# Patient Record
Sex: MALE | Race: WHITE | URBAN - METROPOLITAN AREA
[De-identification: names, ages, dates, MRNs, and addresses within clinical notes are randomized per-mention and may not be internally consistent; named-entity substitution may affect disease eponyms.]

---

## 2022-01-19 LAB
ANION GAP SERPL CALCULATED.3IONS-SCNC: 13 MMOL/L (ref 10–20)
BICARBONATE: 23 MMOL/L (ref 21–32)
CALCIUM SERPL-MCNC: 8.7 MG/DL (ref 8.6–10.3)
CHLORIDE BLD-SCNC: 103 MMOL/L (ref 98–107)
CREAT SERPL-MCNC: 1.23 MG/DL (ref 0.5–1.3)
EGFR MALE: 70 ML/MIN/1.73M2
ERYTHROCYTE [DISTWIDTH] IN BLOOD BY AUTOMATED COUNT: 12.3 % (ref 11.5–14)
GLUCOSE: 301 MG/DL (ref 74–99)
HCT VFR BLD CALC: 31.9 % (ref 41–52)
HEMOGLOBIN: 10.3 G/DL (ref 13.5–17)
MCHC RBC AUTO-ENTMCNC: 32.3 G/DL (ref 32–36)
MCV RBC AUTO: 91 FL (ref 80–100)
PLATELET # BLD: 288 X10E9/L (ref 150–450)
POTASSIUM SERPL-SCNC: 4.4 MMOL/L (ref 3.5–5.3)
RBC # BLD: 3.49 X10E12/L (ref 4.5–5.9)
SODIUM BLD-SCNC: 135 MMOL/L (ref 136–145)
UREA NITROGEN: 20 MG/DL (ref 6–23)
WBC: 5.5 X10E9/L (ref 4.4–11.3)

## 2022-01-27 LAB
ANION GAP SERPL CALCULATED.3IONS-SCNC: 11 MMOL/L (ref 10–20)
BICARBONATE: 23 MMOL/L (ref 21–32)
CALCIUM SERPL-MCNC: 8.4 MG/DL (ref 8.6–10.3)
CHLORIDE BLD-SCNC: 102 MMOL/L (ref 98–107)
CREAT SERPL-MCNC: 1.26 MG/DL (ref 0.5–1.3)
EGFR MALE: 68 ML/MIN/1.73M2
ERYTHROCYTE [DISTWIDTH] IN BLOOD BY AUTOMATED COUNT: 12.4 % (ref 11.5–14)
GLUCOSE: 383 MG/DL (ref 74–99)
HCT VFR BLD CALC: 30.5 % (ref 41–52)
HEMOGLOBIN: 9.9 G/DL (ref 13.5–17)
MCHC RBC AUTO-ENTMCNC: 32.5 G/DL (ref 32–36)
MCV RBC AUTO: 90 FL (ref 80–100)
PLATELET # BLD: 200 X10E9/L (ref 150–450)
POTASSIUM SERPL-SCNC: 4.7 MMOL/L (ref 3.5–5.3)
RBC # BLD: 3.38 X10E12/L (ref 4.5–5.9)
SODIUM BLD-SCNC: 131 MMOL/L (ref 136–145)
UREA NITROGEN: 31 MG/DL (ref 6–23)
WBC: 6.5 X10E9/L (ref 4.4–11.3)

## 2022-02-01 LAB
ANION GAP SERPL CALCULATED.3IONS-SCNC: 12 MMOL/L (ref 10–20)
BICARBONATE: 24 MMOL/L (ref 21–32)
CALCIUM SERPL-MCNC: 8.6 MG/DL (ref 8.6–10.3)
CHLORIDE BLD-SCNC: 104 MMOL/L (ref 98–107)
CREAT SERPL-MCNC: 1.47 MG/DL (ref 0.5–1.3)
EGFR MALE: 57 ML/MIN/1.73M2
ERYTHROCYTE [DISTWIDTH] IN BLOOD BY AUTOMATED COUNT: 12.9 % (ref 11.5–14)
GLUCOSE: 272 MG/DL (ref 74–99)
HCT VFR BLD CALC: 31.2 % (ref 41–52)
HEMOGLOBIN: 10.1 G/DL (ref 13.5–17)
MCHC RBC AUTO-ENTMCNC: 32.4 G/DL (ref 32–36)
MCV RBC AUTO: 91 FL (ref 80–100)
PLATELET # BLD: 212 X10E9/L (ref 150–450)
POTASSIUM SERPL-SCNC: 4.6 MMOL/L (ref 3.5–5.3)
RBC # BLD: 3.44 X10E12/L (ref 4.5–5.9)
SODIUM BLD-SCNC: 135 MMOL/L (ref 136–145)
UREA NITROGEN: 37 MG/DL (ref 6–23)
WBC: 6.2 X10E9/L (ref 4.4–11.3)

## 2022-02-08 LAB
ANION GAP SERPL CALCULATED.3IONS-SCNC: 11 MMOL/L (ref 10–20)
BICARBONATE: 26 MMOL/L (ref 21–32)
CALCIUM SERPL-MCNC: 9 MG/DL (ref 8.6–10.3)
CHLORIDE BLD-SCNC: 102 MMOL/L (ref 98–107)
CREAT SERPL-MCNC: 1.1 MG/DL (ref 0.5–1.3)
EGFR MALE: 80 ML/MIN/1.73M2
ERYTHROCYTE [DISTWIDTH] IN BLOOD BY AUTOMATED COUNT: 13 % (ref 11.5–14)
ESTIMATED AVERAGE GLUCOSE: 283 MG/DL
GLUCOSE: 380 MG/DL (ref 74–99)
HBA1C MFR BLD: 11.5 %
HCT VFR BLD CALC: 31.9 % (ref 41–52)
HEMOGLOBIN: 10.3 G/DL (ref 13.5–17)
MCHC RBC AUTO-ENTMCNC: 32.3 G/DL (ref 32–36)
MCV RBC AUTO: 91 FL (ref 80–100)
PLATELET # BLD: 219 X10E9/L (ref 150–450)
POTASSIUM SERPL-SCNC: 4.8 MMOL/L (ref 3.5–5.3)
RBC # BLD: 3.52 X10E12/L (ref 4.5–5.9)
SODIUM BLD-SCNC: 134 MMOL/L (ref 136–145)
UREA NITROGEN: 27 MG/DL (ref 6–23)
WBC: 7.2 X10E9/L (ref 4.4–11.3)

## 2022-02-10 ENCOUNTER — TELEPHONE (OUTPATIENT)
Dept: INFECTIOUS DISEASES | Age: 52
End: 2022-02-10

## 2022-02-10 NOTE — TELEPHONE ENCOUNTER
[address] has called several times is regards to the End of Tx with IV Abx. [address] records obtained. Podiatry Notes obtained. Per review with Dr Roberth Herrera, patient is to be seen prior to ending the IV Abx. Will update Pharmacy and Patient. Patient has a F/u with ID on 3/3/2022.

## 2022-02-15 ENCOUNTER — TELEPHONE (OUTPATIENT)
Dept: INFECTIOUS DISEASES | Age: 52
End: 2022-02-15

## 2022-02-15 LAB
ANION GAP SERPL CALCULATED.3IONS-SCNC: 13 MMOL/L (ref 10–20)
BICARBONATE: 25 MMOL/L (ref 21–32)
CALCIUM SERPL-MCNC: 8.8 MG/DL (ref 8.6–10.3)
CHLORIDE BLD-SCNC: 100 MMOL/L (ref 98–107)
CREAT SERPL-MCNC: 1.23 MG/DL (ref 0.5–1.3)
EGFR MALE: 70 ML/MIN/1.73M2
ERYTHROCYTE [DISTWIDTH] IN BLOOD BY AUTOMATED COUNT: 12.9 % (ref 11.5–14)
GLUCOSE: 475 MG/DL (ref 74–99)
HCT VFR BLD CALC: 32.1 % (ref 41–52)
HEMOGLOBIN: 10.3 G/DL (ref 13.5–17)
MCHC RBC AUTO-ENTMCNC: 32.1 G/DL (ref 32–36)
MCV RBC AUTO: 92 FL (ref 80–100)
PLATELET # BLD: 203 X10E9/L (ref 150–450)
POTASSIUM SERPL-SCNC: 5.3 MMOL/L (ref 3.5–5.3)
RBC # BLD: 3.49 X10E12/L (ref 4.5–5.9)
SODIUM BLD-SCNC: 133 MMOL/L (ref 136–145)
UREA NITROGEN: 25 MG/DL (ref 6–23)
WBC: 4.5 X10E9/L (ref 4.4–11.3)

## 2022-02-15 NOTE — TELEPHONE ENCOUNTER
@ 2:45pm,  reports a High Glucose result from Labs drawn today, Will update ID Staff. Results expected to be faxed.  The PCP will be updated

## 2022-02-22 LAB
ANION GAP SERPL CALCULATED.3IONS-SCNC: 13 MMOL/L (ref 10–20)
BICARBONATE: 25 MMOL/L (ref 21–32)
CALCIUM SERPL-MCNC: 9.2 MG/DL (ref 8.6–10.3)
CHLORIDE BLD-SCNC: 100 MMOL/L (ref 98–107)
CREAT SERPL-MCNC: 1.24 MG/DL (ref 0.5–1.3)
EGFR MALE: 70 ML/MIN/1.73M2
ERYTHROCYTE [DISTWIDTH] IN BLOOD BY AUTOMATED COUNT: 13.5 % (ref 11.5–14)
GLUCOSE: 402 MG/DL (ref 74–99)
HCT VFR BLD CALC: 35.8 % (ref 41–52)
HEMOGLOBIN: 11.4 G/DL (ref 13.5–17)
MCHC RBC AUTO-ENTMCNC: 31.8 G/DL (ref 32–36)
MCV RBC AUTO: 94 FL (ref 80–100)
PLATELET # BLD: 226 X10E9/L (ref 150–450)
POTASSIUM SERPL-SCNC: 4.1 MMOL/L (ref 3.5–5.3)
RBC # BLD: 3.79 X10E12/L (ref 4.5–5.9)
SODIUM BLD-SCNC: 134 MMOL/L (ref 136–145)
UREA NITROGEN: 27 MG/DL (ref 6–23)
WBC: 4.3 X10E9/L (ref 4.4–11.3)

## 2022-03-01 LAB
ANION GAP SERPL CALCULATED.3IONS-SCNC: 16 MMOL/L (ref 10–20)
BICARBONATE: 20 MMOL/L (ref 21–32)
CALCIUM SERPL-MCNC: 8.9 MG/DL (ref 8.6–10.3)
CHLORIDE BLD-SCNC: 106 MMOL/L (ref 98–107)
CREAT SERPL-MCNC: 1.14 MG/DL (ref 0.5–1.3)
EGFR MALE: 77 ML/MIN/1.73M2
ERYTHROCYTE [DISTWIDTH] IN BLOOD BY AUTOMATED COUNT: 12.9 % (ref 11.5–14)
GLUCOSE: 225 MG/DL (ref 74–99)
HCT VFR BLD CALC: 32.9 % (ref 41–52)
HEMOGLOBIN: 10.9 G/DL (ref 13.5–17)
MCHC RBC AUTO-ENTMCNC: 33.1 G/DL (ref 32–36)
MCV RBC AUTO: 91 FL (ref 80–100)
PLATELET # BLD: 226 X10E9/L (ref 150–450)
POTASSIUM SERPL-SCNC: 4.5 MMOL/L (ref 3.5–5.3)
RBC # BLD: 3.63 X10E12/L (ref 4.5–5.9)
SODIUM BLD-SCNC: 137 MMOL/L (ref 136–145)
UREA NITROGEN: 25 MG/DL (ref 6–23)
WBC: 4.9 X10E9/L (ref 4.4–11.3)

## 2022-03-03 ENCOUNTER — OFFICE VISIT (OUTPATIENT)
Dept: INFECTIOUS DISEASES | Age: 52
End: 2022-03-03
Payer: COMMERCIAL

## 2022-03-03 VITALS
SYSTOLIC BLOOD PRESSURE: 104 MMHG | HEIGHT: 67 IN | RESPIRATION RATE: 18 BRPM | TEMPERATURE: 97.7 F | HEART RATE: 82 BPM | BODY MASS INDEX: 23.86 KG/M2 | DIASTOLIC BLOOD PRESSURE: 60 MMHG | WEIGHT: 152 LBS

## 2022-03-03 DIAGNOSIS — M86.9 OSTEOMYELITIS OF GREAT TOE OF LEFT FOOT (HCC): Primary | ICD-10-CM

## 2022-03-03 PROCEDURE — 99213 OFFICE O/P EST LOW 20 MIN: CPT | Performed by: INTERNAL MEDICINE

## 2022-03-03 RX ORDER — ZIPRASIDONE HYDROCHLORIDE 80 MG/1
CAPSULE ORAL
COMMUNITY
Start: 2022-01-04 | End: 2022-03-03

## 2022-03-03 RX ORDER — PIPERACILLIN SODIUM, TAZOBACTAM SODIUM 3; .375 G/15ML; G/15ML
INJECTION, POWDER, LYOPHILIZED, FOR SOLUTION INTRAVENOUS
COMMUNITY
Start: 2022-02-23

## 2022-03-03 RX ORDER — CEPHALEXIN 500 MG/1
CAPSULE ORAL
COMMUNITY
Start: 2021-12-21

## 2022-03-03 RX ORDER — BUDESONIDE AND FORMOTEROL FUMARATE DIHYDRATE 80; 4.5 UG/1; UG/1
AEROSOL RESPIRATORY (INHALATION)
COMMUNITY
Start: 2022-01-05

## 2022-03-03 RX ORDER — SODIUM CHLORIDE 9 MG/ML
INJECTION, SOLUTION INTRAVENOUS
COMMUNITY
Start: 2022-02-23

## 2022-03-03 RX ORDER — LISINOPRIL 2.5 MG/1
2.5 TABLET ORAL DAILY
COMMUNITY
Start: 2021-08-03

## 2022-03-03 RX ORDER — BENZTROPINE MESYLATE 0.5 MG/1
TABLET ORAL
COMMUNITY
Start: 2022-01-06

## 2022-03-03 RX ORDER — NAPROXEN SODIUM 550 MG/1
TABLET ORAL
COMMUNITY
Start: 2021-12-21

## 2022-03-03 RX ORDER — DOXYCYCLINE HYCLATE 100 MG/1
CAPSULE ORAL
COMMUNITY
Start: 2021-12-21

## 2022-03-03 RX ORDER — MICAFUNGIN SODIUM 100 MG/5ML
INJECTION, POWDER, LYOPHILIZED, FOR SOLUTION INTRAVENOUS
COMMUNITY
Start: 2022-02-23

## 2022-03-03 RX ORDER — FLUOXETINE HYDROCHLORIDE 20 MG/1
CAPSULE ORAL
COMMUNITY

## 2022-03-03 RX ORDER — ATORVASTATIN CALCIUM 10 MG/1
10 TABLET, FILM COATED ORAL DAILY
COMMUNITY
Start: 2022-02-11

## 2022-03-03 ASSESSMENT — ENCOUNTER SYMPTOMS
GASTROINTESTINAL NEGATIVE: 1
RESPIRATORY NEGATIVE: 1

## 2022-03-03 NOTE — PROGRESS NOTES
Infectious Disease     Patient Name: Ho Chairez  Date: 3/3/2022  YOB: 1970  Medical Record Number: [id number]      Patient hospitalized at [facility] with osteomyelitis left foot at bedside debridement at [facility]    He was discharged on Zosyn and micafungin completing more than 6 weeks of therapy    Wound culture from 1/6/2022 grew Enterococcus faecalis pansensitive and Candida         Review of Systems   Respiratory: Negative. Cardiovascular: Negative. Gastrointestinal: Negative. Skin: Positive for wound. Review of Systems: All 14 review of systems negative other than as stated above    Social History     Tobacco Use    Smoking status: Never Smoker    Smokeless tobacco: Never Used   Substance Use Topics    Alcohol use: Not on file    Drug use: Not on file         Past medical history hypertension chronic renal sufficiency diabetes on insulin hyperlipidemia      No past surgical history on file.       Current Outpatient Medications on File Prior to Visit   Medication Sig Dispense Refill    lisinopril (PRINIVIL;ZESTRIL) 2.5 MG tablet Take 2.5 mg by mouth daily      atorvastatin (LIPITOR) 10 MG tablet Take 10 mg by mouth daily      cephALEXin (KEFLEX) 500 MG capsule take 1 capsule by mouth four times a day for 10 days      doxycycline hyclate (VIBRAMYCIN) 100 MG capsule take 1 capsule by mouth twice a day for 10 days      piperacillin-tazobactam (ZOSYN) 3.375 (3-0.375) g injection       sodium chloride 0.9 % infusion       ziprasidone (GEODON) 80 MG capsule take 2 capsules by mouth at bedtime      micafungin (MYCAMINE) 100 MG SOLR       naproxen sodium (ANAPROX) 550 MG tablet take 1 tablet by mouth twice a day for 10 days      FLUoxetine (PROZAC) 20 MG capsule Take by mouth      budesonide-formoterol (SYMBICORT) 80-4.5 MCG/ACT AERO       benztropine (COGENTIN) 0.5 MG tablet take 1 tablet by mouth twice a day      ZIPRASIDONE HCL PO Take by mouth       No current facility-administered medications on file prior to visit. No Known Allergies      No family history on file. Physical Exam:      Physical Exam  Constitutional:       General: He is not in acute distress. Appearance: He is not ill-appearing. Cardiovascular:      Heart sounds: No murmur heard. Pulmonary:      Effort: No respiratory distress. Breath sounds: Normal breath sounds. No wheezing, rhonchi or rales. Abdominal:      General: Bowel sounds are normal. There is no distension. Tenderness: There is no abdominal tenderness. There is no guarding. Hernia: No hernia is present. Musculoskeletal:        Feet:          Blood pressure 104/60, pulse 82, temperature 97.7 °F (36.5 °C), temperature source Temporal, resp. rate 18, height 5' 7\" (1.702 m), weight 152 lb (68.9 kg).       .   Lab Results   Component Value Date    WBC 4.9 03/01/2022    HGB 10.9 (L) 03/01/2022    HCT 32.9 (L) 03/01/2022    MCV 91 03/01/2022     03/01/2022     Lab Results   Component Value Date     03/01/2022    K 4.5 03/01/2022     03/01/2022    CO2 27 09/09/2012    BUN 20 09/09/2012    CREATININE 1.14 03/01/2022    GLUCOSE 225 03/01/2022    CALCIUM 8.9 03/01/2022                      PLAN:    Osteomyelitis left foot  Has had more than 6 weeks IV antibiotics discontinue IV antibiotics discontinue PICC    Follow-up with wound center

## 2023-02-20 LAB
ALANINE AMINOTRANSFERASE (SGPT) (U/L) IN SER/PLAS: 22 U/L (ref 10–52)
ALBUMIN (G/DL) IN SER/PLAS: 3.7 G/DL (ref 3.4–5)
ALBUMIN (MG/L) IN URINE: 96.5 MG/L
ALBUMIN/CREATININE (UG/MG) IN URINE: 159.8 UG/MG CRT (ref 0–30)
ALKALINE PHOSPHATASE (U/L) IN SER/PLAS: 94 U/L (ref 33–120)
ANION GAP IN SER/PLAS: 10 MMOL/L (ref 10–20)
ASPARTATE AMINOTRANSFERASE (SGOT) (U/L) IN SER/PLAS: 17 U/L (ref 9–39)
BILIRUBIN TOTAL (MG/DL) IN SER/PLAS: 0.3 MG/DL (ref 0–1.2)
CALCIUM (MG/DL) IN SER/PLAS: 9.5 MG/DL (ref 8.6–10.3)
CARBON DIOXIDE, TOTAL (MMOL/L) IN SER/PLAS: 26 MMOL/L (ref 21–32)
CHLORIDE (MMOL/L) IN SER/PLAS: 106 MMOL/L (ref 98–107)
CHOLESTEROL (MG/DL) IN SER/PLAS: 155 MG/DL (ref 0–199)
CHOLESTEROL IN HDL (MG/DL) IN SER/PLAS: 41.7 MG/DL
CHOLESTEROL/HDL RATIO: 3.7
CREATININE (MG/DL) IN SER/PLAS: 1.21 MG/DL (ref 0.5–1.3)
CREATININE (MG/DL) IN URINE: 60.4 MG/DL (ref 20–370)
ESTIMATED AVERAGE GLUCOSE FOR HBA1C: 171 MG/DL
GFR MALE: 71 ML/MIN/1.73M2
GLUCOSE (MG/DL) IN SER/PLAS: 210 MG/DL (ref 74–99)
HEMOGLOBIN A1C/HEMOGLOBIN TOTAL IN BLOOD: 7.6 %
LDL: 82 MG/DL (ref 0–99)
POTASSIUM (MMOL/L) IN SER/PLAS: 4.4 MMOL/L (ref 3.5–5.3)
PROTEIN TOTAL: 7 G/DL (ref 6.4–8.2)
SODIUM (MMOL/L) IN SER/PLAS: 138 MMOL/L (ref 136–145)
THYROTROPIN (MIU/L) IN SER/PLAS BY DETECTION LIMIT <= 0.05 MIU/L: 4.03 MIU/L (ref 0.44–3.98)
TRIGLYCERIDE (MG/DL) IN SER/PLAS: 156 MG/DL (ref 0–149)
UREA NITROGEN (MG/DL) IN SER/PLAS: 31 MG/DL (ref 6–23)
VLDL: 31 MG/DL (ref 0–40)

## 2023-04-03 LAB
ALBUMIN: 3.3 G/DL (ref 3.4–5)
ALP BLD-CCNC: 98 U/L (ref 33–120)
ALT SERPL-CCNC: 19 U/L (ref 10–52)
ANION GAP SERPL CALCULATED.3IONS-SCNC: 13 MMOL/L (ref 10–20)
AST SERPL-CCNC: 13 U/L (ref 9–39)
BASOPHILS # BLD: 0.04 X10E9/L (ref 0–0.1)
BASOPHILS RELATIVE PERCENT: 0.6 % (ref 0–2)
BICARBONATE: 26 MMOL/L (ref 21–32)
BILIRUB SERPL-MCNC: 0.3 MG/DL (ref 0–1.2)
C-REACTIVE PROTEIN: 0.44 MG/DL
CALCIUM SERPL-MCNC: 8.9 MG/DL (ref 8.6–10.3)
CHLORIDE BLD-SCNC: 107 MMOL/L (ref 98–107)
CREAT SERPL-MCNC: 1.22 MG/DL (ref 0.5–1.3)
EGFR MALE: 71 ML/MIN/1.73M2
EOSINOPHIL # BLD: 0.31 X10E9/L (ref 0–0.7)
EOSINOPHILS RELATIVE PERCENT: 4.4 % (ref 0–6)
ERYTHROCYTE [DISTWIDTH] IN BLOOD BY AUTOMATED COUNT: 12.6 % (ref 11.5–14)
GLUCOSE: 207 MG/DL (ref 74–99)
HCT VFR BLD CALC: 33.6 % (ref 41–52)
HEMOGLOBIN: 11.4 G/DL (ref 13.5–17)
IMMATURE GRANULOCYTES %: 0.1 % (ref 0–0.9)
LYMPHOCYTES # BLD: 23.8 % (ref 13–44)
LYMPHOCYTES RELATIVE PERCENT: 1.68 X10E9/L (ref 1.2–4.8)
MCHC RBC AUTO-ENTMCNC: 33.9 G/DL (ref 32–36)
MCV RBC AUTO: 87 FL (ref 80–100)
MONOCYTES # BLD: 0.38 X10E9/L (ref 0.1–1)
MONOCYTES RELATIVE PERCENT: 5.4 % (ref 2–10)
NEUTROPHILS RELATIVE PERCENT: 65.7 % (ref 40–80)
NEUTROPHILS: 4.64 X10E9/L (ref 1.2–7.7)
PLATELET # BLD: 217 X10E9/L (ref 150–450)
POTASSIUM SERPL-SCNC: 5 MMOL/L (ref 3.5–5.3)
RBC # BLD: 3.87 X10E12/L (ref 4.5–5.9)
SODIUM BLD-SCNC: 141 MMOL/L (ref 136–145)
TOTAL PROTEIN: 5.8 G/DL (ref 6.4–8.2)
UREA NITROGEN: 24 MG/DL (ref 6–23)
WBC: 7.1 X10E9/L (ref 4.4–11.3)

## 2023-04-10 LAB
ALANINE AMINOTRANSFERASE (SGPT) (U/L) IN SER/PLAS: 29 U/L (ref 10–52)
ALBUMIN (G/DL) IN SER/PLAS: 3.6 G/DL (ref 3.4–5)
ALKALINE PHOSPHATASE (U/L) IN SER/PLAS: 100 U/L (ref 33–120)
ANION GAP IN SER/PLAS: 11 MMOL/L (ref 10–20)
ASPARTATE AMINOTRANSFERASE (SGOT) (U/L) IN SER/PLAS: 17 U/L (ref 9–39)
BASOPHILS (10*3/UL) IN BLOOD BY AUTOMATED COUNT: 0.01 X10E9/L (ref 0–0.1)
BASOPHILS/100 LEUKOCYTES IN BLOOD BY AUTOMATED COUNT: 0.2 % (ref 0–2)
BILIRUBIN TOTAL (MG/DL) IN SER/PLAS: 0.2 MG/DL (ref 0–1.2)
C REACTIVE PROTEIN (MG/L) IN SER/PLAS: 0.43 MG/DL
CALCIUM (MG/DL) IN SER/PLAS: 8.8 MG/DL (ref 8.6–10.3)
CARBON DIOXIDE, TOTAL (MMOL/L) IN SER/PLAS: 26 MMOL/L (ref 21–32)
CHLORIDE (MMOL/L) IN SER/PLAS: 109 MMOL/L (ref 98–107)
CREATININE (MG/DL) IN SER/PLAS: 1.09 MG/DL (ref 0.5–1.3)
EOSINOPHILS (10*3/UL) IN BLOOD BY AUTOMATED COUNT: 0.18 X10E9/L (ref 0–0.7)
EOSINOPHILS/100 LEUKOCYTES IN BLOOD BY AUTOMATED COUNT: 3 % (ref 0–6)
ERYTHROCYTE DISTRIBUTION WIDTH (RATIO) BY AUTOMATED COUNT: 12.6 % (ref 11.5–14.5)
ERYTHROCYTE MEAN CORPUSCULAR HEMOGLOBIN CONCENTRATION (G/DL) BY AUTOMATED: 33 G/DL (ref 32–36)
ERYTHROCYTE MEAN CORPUSCULAR VOLUME (FL) BY AUTOMATED COUNT: 88 FL (ref 80–100)
ERYTHROCYTES (10*6/UL) IN BLOOD BY AUTOMATED COUNT: 3.87 X10E12/L (ref 4.5–5.9)
GFR MALE: 81 ML/MIN/1.73M2
GLUCOSE (MG/DL) IN SER/PLAS: 54 MG/DL (ref 74–99)
HEMATOCRIT (%) IN BLOOD BY AUTOMATED COUNT: 34.2 % (ref 41–52)
HEMOGLOBIN (G/DL) IN BLOOD: 11.3 G/DL (ref 13.5–17.5)
IMMATURE GRANULOCYTES/100 LEUKOCYTES IN BLOOD BY AUTOMATED COUNT: 0.2 % (ref 0–0.9)
LEUKOCYTES (10*3/UL) IN BLOOD BY AUTOMATED COUNT: 6 X10E9/L (ref 4.4–11.3)
LYMPHOCYTES (10*3/UL) IN BLOOD BY AUTOMATED COUNT: 1.43 X10E9/L (ref 1.2–4.8)
LYMPHOCYTES/100 LEUKOCYTES IN BLOOD BY AUTOMATED COUNT: 24 % (ref 13–44)
MONOCYTES (10*3/UL) IN BLOOD BY AUTOMATED COUNT: 0.38 X10E9/L (ref 0.1–1)
MONOCYTES/100 LEUKOCYTES IN BLOOD BY AUTOMATED COUNT: 6.4 % (ref 2–10)
NEUTROPHILS (10*3/UL) IN BLOOD BY AUTOMATED COUNT: 3.96 X10E9/L (ref 1.2–7.7)
NEUTROPHILS/100 LEUKOCYTES IN BLOOD BY AUTOMATED COUNT: 66.2 % (ref 40–80)
PLATELETS (10*3/UL) IN BLOOD AUTOMATED COUNT: 205 X10E9/L (ref 150–450)
POTASSIUM (MMOL/L) IN SER/PLAS: 4.5 MMOL/L (ref 3.5–5.3)
PROTEIN TOTAL: 6.3 G/DL (ref 6.4–8.2)
SODIUM (MMOL/L) IN SER/PLAS: 141 MMOL/L (ref 136–145)
UREA NITROGEN (MG/DL) IN SER/PLAS: 23 MG/DL (ref 6–23)

## 2023-04-17 LAB
ALANINE AMINOTRANSFERASE (SGPT) (U/L) IN SER/PLAS: 25 U/L (ref 10–52)
ALBUMIN (G/DL) IN SER/PLAS: 3.9 G/DL (ref 3.4–5)
ALBUMIN: 3.9 G/DL (ref 3.4–5)
ALKALINE PHOSPHATASE (U/L) IN SER/PLAS: 105 U/L (ref 33–120)
ALP BLD-CCNC: 105 U/L (ref 33–120)
ALT SERPL-CCNC: 25 U/L (ref 10–52)
ANION GAP IN SER/PLAS: 14 MMOL/L (ref 10–20)
ANION GAP SERPL CALCULATED.3IONS-SCNC: 14 MMOL/L (ref 10–20)
ASPARTATE AMINOTRANSFERASE (SGOT) (U/L) IN SER/PLAS: 18 U/L (ref 9–39)
AST SERPL-CCNC: 18 U/L (ref 9–39)
BASOPHILS # BLD: 0.04 X10E9/L (ref 0–0.1)
BASOPHILS (10*3/UL) IN BLOOD BY AUTOMATED COUNT: 0.04 X10E9/L (ref 0–0.1)
BASOPHILS RELATIVE PERCENT: 0.6 % (ref 0–2)
BASOPHILS/100 LEUKOCYTES IN BLOOD BY AUTOMATED COUNT: 0.6 % (ref 0–2)
BICARBONATE: 25 MMOL/L (ref 21–32)
BILIRUB SERPL-MCNC: 0.3 MG/DL (ref 0–1.2)
BILIRUBIN TOTAL (MG/DL) IN SER/PLAS: 0.3 MG/DL (ref 0–1.2)
C REACTIVE PROTEIN (MG/L) IN SER/PLAS: 0.25 MG/DL
C-REACTIVE PROTEIN: 0.25 MG/DL
CALCIUM (MG/DL) IN SER/PLAS: 9.2 MG/DL (ref 8.6–10.3)
CALCIUM SERPL-MCNC: 9.2 MG/DL (ref 8.6–10.3)
CARBON DIOXIDE, TOTAL (MMOL/L) IN SER/PLAS: 25 MMOL/L (ref 21–32)
CHLORIDE (MMOL/L) IN SER/PLAS: 104 MMOL/L (ref 98–107)
CHLORIDE BLD-SCNC: 104 MMOL/L (ref 98–107)
CREAT SERPL-MCNC: 1.07 MG/DL (ref 0.5–1.3)
CREATININE (MG/DL) IN SER/PLAS: 1.07 MG/DL (ref 0.5–1.3)
EGFR MALE: 83 ML/MIN/1.73M2
EOSINOPHIL # BLD: 0.35 X10E9/L (ref 0–0.7)
EOSINOPHILS (10*3/UL) IN BLOOD BY AUTOMATED COUNT: 0.35 X10E9/L (ref 0–0.7)
EOSINOPHILS RELATIVE PERCENT: 5 % (ref 0–6)
EOSINOPHILS/100 LEUKOCYTES IN BLOOD BY AUTOMATED COUNT: 5 % (ref 0–6)
ERYTHROCYTE DISTRIBUTION WIDTH (RATIO) BY AUTOMATED COUNT: 13.1 % (ref 11.5–14.5)
ERYTHROCYTE MEAN CORPUSCULAR HEMOGLOBIN CONCENTRATION (G/DL) BY AUTOMATED: 32.7 G/DL (ref 32–36)
ERYTHROCYTE MEAN CORPUSCULAR VOLUME (FL) BY AUTOMATED COUNT: 91 FL (ref 80–100)
ERYTHROCYTE [DISTWIDTH] IN BLOOD BY AUTOMATED COUNT: 13.1 % (ref 11.5–14)
ERYTHROCYTES (10*6/UL) IN BLOOD BY AUTOMATED COUNT: 4.18 X10E12/L (ref 4.5–5.9)
GFR MALE: 83 ML/MIN/1.73M2
GLUCOSE (MG/DL) IN SER/PLAS: 113 MG/DL (ref 74–99)
GLUCOSE: 113 MG/DL (ref 74–99)
HCT VFR BLD CALC: 37.9 % (ref 41–52)
HEMATOCRIT (%) IN BLOOD BY AUTOMATED COUNT: 37.9 % (ref 41–52)
HEMOGLOBIN (G/DL) IN BLOOD: 12.4 G/DL (ref 13.5–17.5)
HEMOGLOBIN: 12.4 G/DL (ref 13.5–17)
IMMATURE GRANULOCYTES %: 0.9 % (ref 0–0.9)
IMMATURE GRANULOCYTES/100 LEUKOCYTES IN BLOOD BY AUTOMATED COUNT: 0.9 % (ref 0–0.9)
LEUKOCYTES (10*3/UL) IN BLOOD BY AUTOMATED COUNT: 7 X10E9/L (ref 4.4–11.3)
LYMPHOCYTES # BLD: 24 % (ref 13–44)
LYMPHOCYTES (10*3/UL) IN BLOOD BY AUTOMATED COUNT: 1.67 X10E9/L (ref 1.2–4.8)
LYMPHOCYTES RELATIVE PERCENT: 1.67 X10E9/L (ref 1.2–4.8)
LYMPHOCYTES/100 LEUKOCYTES IN BLOOD BY AUTOMATED COUNT: 24 % (ref 13–44)
MCHC RBC AUTO-ENTMCNC: 32.7 G/DL (ref 32–36)
MCV RBC AUTO: 91 FL (ref 80–100)
MONOCYTES # BLD: 0.46 X10E9/L (ref 0.1–1)
MONOCYTES (10*3/UL) IN BLOOD BY AUTOMATED COUNT: 0.46 X10E9/L (ref 0.1–1)
MONOCYTES RELATIVE PERCENT: 6.6 % (ref 2–10)
MONOCYTES/100 LEUKOCYTES IN BLOOD BY AUTOMATED COUNT: 6.6 % (ref 2–10)
NEUTROPHILS (10*3/UL) IN BLOOD BY AUTOMATED COUNT: 4.37 X10E9/L (ref 1.2–7.7)
NEUTROPHILS RELATIVE PERCENT: 62.9 % (ref 40–80)
NEUTROPHILS/100 LEUKOCYTES IN BLOOD BY AUTOMATED COUNT: 62.9 % (ref 40–80)
NEUTROPHILS: 4.37 X10E9/L (ref 1.2–7.7)
PLATELET # BLD: 255 X10E9/L (ref 150–450)
PLATELETS (10*3/UL) IN BLOOD AUTOMATED COUNT: 255 X10E9/L (ref 150–450)
POTASSIUM (MMOL/L) IN SER/PLAS: 4.7 MMOL/L (ref 3.5–5.3)
POTASSIUM SERPL-SCNC: 4.7 MMOL/L (ref 3.5–5.3)
PROTEIN TOTAL: 6.6 G/DL (ref 6.4–8.2)
RBC # BLD: 4.18 X10E12/L (ref 4.5–5.9)
SODIUM (MMOL/L) IN SER/PLAS: 138 MMOL/L (ref 136–145)
SODIUM BLD-SCNC: 138 MMOL/L (ref 136–145)
TOTAL PROTEIN: 6.6 G/DL (ref 6.4–8.2)
UREA NITROGEN (MG/DL) IN SER/PLAS: 25 MG/DL (ref 6–23)
UREA NITROGEN: 25 MG/DL (ref 6–23)
WBC: 7 X10E9/L (ref 4.4–11.3)

## 2023-04-24 LAB
ALANINE AMINOTRANSFERASE (SGPT) (U/L) IN SER/PLAS: 26 U/L (ref 10–52)
ALBUMIN (G/DL) IN SER/PLAS: 3.5 G/DL (ref 3.4–5)
ALBUMIN: 3.5 G/DL (ref 3.4–5)
ALKALINE PHOSPHATASE (U/L) IN SER/PLAS: 105 U/L (ref 33–120)
ALP BLD-CCNC: 105 U/L (ref 33–120)
ALT SERPL-CCNC: 26 U/L (ref 10–52)
ANION GAP IN SER/PLAS: 13 MMOL/L (ref 10–20)
ANION GAP SERPL CALCULATED.3IONS-SCNC: 13 MMOL/L (ref 10–20)
ASPARTATE AMINOTRANSFERASE (SGOT) (U/L) IN SER/PLAS: 17 U/L (ref 9–39)
AST SERPL-CCNC: 17 U/L (ref 9–39)
BASOPHILS # BLD: 0.04 X10E9/L (ref 0–0.1)
BASOPHILS (10*3/UL) IN BLOOD BY AUTOMATED COUNT: 0.04 X10E9/L (ref 0–0.1)
BASOPHILS RELATIVE PERCENT: 0.6 % (ref 0–2)
BASOPHILS/100 LEUKOCYTES IN BLOOD BY AUTOMATED COUNT: 0.6 % (ref 0–2)
BICARBONATE: 22 MMOL/L (ref 21–32)
BILIRUB SERPL-MCNC: 0.3 MG/DL (ref 0–1.2)
BILIRUBIN TOTAL (MG/DL) IN SER/PLAS: 0.3 MG/DL (ref 0–1.2)
C REACTIVE PROTEIN (MG/L) IN SER/PLAS: 0.32 MG/DL
C-REACTIVE PROTEIN: 0.32 MG/DL
CALCIUM (MG/DL) IN SER/PLAS: 8.7 MG/DL (ref 8.6–10.3)
CALCIUM SERPL-MCNC: 8.7 MG/DL (ref 8.6–10.3)
CARBON DIOXIDE, TOTAL (MMOL/L) IN SER/PLAS: 22 MMOL/L (ref 21–32)
CHLORIDE (MMOL/L) IN SER/PLAS: 107 MMOL/L (ref 98–107)
CHLORIDE BLD-SCNC: 107 MMOL/L (ref 98–107)
CREAT SERPL-MCNC: 1.12 MG/DL (ref 0.5–1.3)
CREATININE (MG/DL) IN SER/PLAS: 1.12 MG/DL (ref 0.5–1.3)
EGFR MALE: 78 ML/MIN/1.73M2
EOSINOPHIL # BLD: 0.31 X10E9/L (ref 0–0.7)
EOSINOPHILS (10*3/UL) IN BLOOD BY AUTOMATED COUNT: 0.31 X10E9/L (ref 0–0.7)
EOSINOPHILS RELATIVE PERCENT: 5 % (ref 0–6)
EOSINOPHILS/100 LEUKOCYTES IN BLOOD BY AUTOMATED COUNT: 5 % (ref 0–6)
ERYTHROCYTE DISTRIBUTION WIDTH (RATIO) BY AUTOMATED COUNT: 12.9 % (ref 11.5–14.5)
ERYTHROCYTE MEAN CORPUSCULAR HEMOGLOBIN CONCENTRATION (G/DL) BY AUTOMATED: 33.2 G/DL (ref 32–36)
ERYTHROCYTE MEAN CORPUSCULAR VOLUME (FL) BY AUTOMATED COUNT: 89 FL (ref 80–100)
ERYTHROCYTE [DISTWIDTH] IN BLOOD BY AUTOMATED COUNT: 12.9 % (ref 11.5–14)
ERYTHROCYTES (10*6/UL) IN BLOOD BY AUTOMATED COUNT: 3.99 X10E12/L (ref 4.5–5.9)
GFR MALE: 78 ML/MIN/1.73M2
GLUCOSE (MG/DL) IN SER/PLAS: 157 MG/DL (ref 74–99)
GLUCOSE: 157 MG/DL (ref 74–99)
HCT VFR BLD CALC: 35.5 % (ref 41–52)
HEMATOCRIT (%) IN BLOOD BY AUTOMATED COUNT: 35.5 % (ref 41–52)
HEMOGLOBIN (G/DL) IN BLOOD: 11.8 G/DL (ref 13.5–17.5)
HEMOGLOBIN: 11.8 G/DL (ref 13.5–17)
IMMATURE GRANULOCYTES %: 0.3 % (ref 0–0.9)
IMMATURE GRANULOCYTES/100 LEUKOCYTES IN BLOOD BY AUTOMATED COUNT: 0.3 % (ref 0–0.9)
LEUKOCYTES (10*3/UL) IN BLOOD BY AUTOMATED COUNT: 6.2 X10E9/L (ref 4.4–11.3)
LYMPHOCYTES # BLD: 26 % (ref 13–44)
LYMPHOCYTES (10*3/UL) IN BLOOD BY AUTOMATED COUNT: 1.6 X10E9/L (ref 1.2–4.8)
LYMPHOCYTES RELATIVE PERCENT: 1.6 X10E9/L (ref 1.2–4.8)
LYMPHOCYTES/100 LEUKOCYTES IN BLOOD BY AUTOMATED COUNT: 26 % (ref 13–44)
MCHC RBC AUTO-ENTMCNC: 33.2 G/DL (ref 32–36)
MCV RBC AUTO: 89 FL (ref 80–100)
MONOCYTES # BLD: 0.4 X10E9/L (ref 0.1–1)
MONOCYTES (10*3/UL) IN BLOOD BY AUTOMATED COUNT: 0.4 X10E9/L (ref 0.1–1)
MONOCYTES RELATIVE PERCENT: 6.5 % (ref 2–10)
MONOCYTES/100 LEUKOCYTES IN BLOOD BY AUTOMATED COUNT: 6.5 % (ref 2–10)
NEUTROPHILS (10*3/UL) IN BLOOD BY AUTOMATED COUNT: 3.79 X10E9/L (ref 1.2–7.7)
NEUTROPHILS RELATIVE PERCENT: 61.6 % (ref 40–80)
NEUTROPHILS/100 LEUKOCYTES IN BLOOD BY AUTOMATED COUNT: 61.6 % (ref 40–80)
NEUTROPHILS: 3.79 X10E9/L (ref 1.2–7.7)
PLATELET # BLD: 223 X10E9/L (ref 150–450)
PLATELETS (10*3/UL) IN BLOOD AUTOMATED COUNT: 223 X10E9/L (ref 150–450)
POTASSIUM (MMOL/L) IN SER/PLAS: 5.1 MMOL/L (ref 3.5–5.3)
POTASSIUM SERPL-SCNC: 5.1 MMOL/L (ref 3.5–5.3)
PROTEIN TOTAL: 6.2 G/DL (ref 6.4–8.2)
RBC # BLD: 3.99 X10E12/L (ref 4.5–5.9)
SODIUM (MMOL/L) IN SER/PLAS: 137 MMOL/L (ref 136–145)
SODIUM BLD-SCNC: 137 MMOL/L (ref 136–145)
TOTAL PROTEIN: 6.2 G/DL (ref 6.4–8.2)
UREA NITROGEN (MG/DL) IN SER/PLAS: 25 MG/DL (ref 6–23)
UREA NITROGEN: 25 MG/DL (ref 6–23)
WBC: 6.2 X10E9/L (ref 4.4–11.3)

## 2023-04-26 ENCOUNTER — TELEPHONE (OUTPATIENT)
Dept: INFECTIOUS DISEASES | Age: 53
End: 2023-04-26

## 2023-04-26 NOTE — TELEPHONE ENCOUNTER
Call received earlier today in regards to End of IV Abx. If Ok to D/c the Picc. Advised we have not heard from the patient and this office was unaware until today. [facility] Med/Recs obtained. Patient treatment is for Left 3rd + 5th toe OM, Partial amputation and Chronic LE Ulcers in a patient with Type 2 DM. Patient was called at about 1pm to inquire how he was doing. Patient said he was feeling better and had one last dose for today and was hoping to D/c the Picc. He had a f/u with his surgeon, Dr Angel Hayes. Advised he was expected to call the ID office and he said he was not aware to F/u with ID. Advised we will review and verify if today is a \"hard stop\". Once reviewed with Dr Milagro Mcnally in clinic,he states to offer a V/v to determine the D/c of Picc and IV Abx. Returned call to patient and he Refused to do a V/v, he said he is not equipped to complete a V/v. Patient refused any further IV ABx and a F/u visit with ID. Per Dr Sally Keen to D/c Picc. Orders relayed to [facility] JORGE, pharmacist to D/c. He verbalized understanding.

## 2023-08-23 LAB
ALANINE AMINOTRANSFERASE (SGPT) (U/L) IN SER/PLAS: 22 U/L (ref 10–52)
ALBUMIN (G/DL) IN SER/PLAS: 3.8 G/DL (ref 3.4–5)
ALBUMIN (MG/L) IN URINE: 49 MG/L
ALBUMIN/CREATININE (UG/MG) IN URINE: 145.4 UG/MG CRT (ref 0–30)
ALKALINE PHOSPHATASE (U/L) IN SER/PLAS: 126 U/L (ref 33–120)
ANION GAP IN SER/PLAS: 11 MMOL/L (ref 10–20)
ASPARTATE AMINOTRANSFERASE (SGOT) (U/L) IN SER/PLAS: 15 U/L (ref 9–39)
BILIRUBIN TOTAL (MG/DL) IN SER/PLAS: 0.3 MG/DL (ref 0–1.2)
CALCIUM (MG/DL) IN SER/PLAS: 9.1 MG/DL (ref 8.6–10.3)
CARBON DIOXIDE, TOTAL (MMOL/L) IN SER/PLAS: 26 MMOL/L (ref 21–32)
CHLORIDE (MMOL/L) IN SER/PLAS: 108 MMOL/L (ref 98–107)
CHOLESTEROL (MG/DL) IN SER/PLAS: 111 MG/DL (ref 0–199)
CHOLESTEROL IN HDL (MG/DL) IN SER/PLAS: 37.8 MG/DL
CHOLESTEROL/HDL RATIO: 2.9
CREATININE (MG/DL) IN SER/PLAS: 1.12 MG/DL (ref 0.5–1.3)
CREATININE (MG/DL) IN URINE: 33.7 MG/DL (ref 20–370)
ESTIMATED AVERAGE GLUCOSE FOR HBA1C: 157 MG/DL
GFR MALE: 78 ML/MIN/1.73M2
GLUCOSE (MG/DL) IN SER/PLAS: 144 MG/DL (ref 74–99)
HEMOGLOBIN A1C/HEMOGLOBIN TOTAL IN BLOOD: 7.1 %
LDL: 56 MG/DL (ref 0–99)
POTASSIUM (MMOL/L) IN SER/PLAS: 4.3 MMOL/L (ref 3.5–5.3)
PROTEIN TOTAL: 7.1 G/DL (ref 6.4–8.2)
SODIUM (MMOL/L) IN SER/PLAS: 141 MMOL/L (ref 136–145)
THYROTROPIN (MIU/L) IN SER/PLAS BY DETECTION LIMIT <= 0.05 MIU/L: 5.48 MIU/L (ref 0.44–3.98)
TRIGLYCERIDE (MG/DL) IN SER/PLAS: 87 MG/DL (ref 0–149)
UREA NITROGEN (MG/DL) IN SER/PLAS: 21 MG/DL (ref 6–23)
VLDL: 17 MG/DL (ref 0–40)

## 2024-01-07 ENCOUNTER — APPOINTMENT (OUTPATIENT)
Dept: RADIOLOGY | Facility: HOSPITAL | Age: 54
DRG: 639 | End: 2024-01-07
Payer: MEDICARE

## 2024-01-07 ENCOUNTER — HOSPITAL ENCOUNTER (INPATIENT)
Facility: HOSPITAL | Age: 54
LOS: 1 days | Discharge: HOME | DRG: 639 | End: 2024-01-09
Attending: STUDENT IN AN ORGANIZED HEALTH CARE EDUCATION/TRAINING PROGRAM | Admitting: HOSPITALIST
Payer: MEDICARE

## 2024-01-07 ENCOUNTER — APPOINTMENT (OUTPATIENT)
Dept: CARDIOLOGY | Facility: HOSPITAL | Age: 54
DRG: 639 | End: 2024-01-07
Payer: MEDICARE

## 2024-01-07 DIAGNOSIS — E16.2 HYPOGLYCEMIA: Primary | ICD-10-CM
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
## 2024-01-07 LAB
ANION GAP SERPL CALC-SCNC: 12 MMOL/L (ref 10–20)
BASOPHILS # BLD AUTO: 0.03 X10*3/UL (ref 0–0.1)
BASOPHILS NFR BLD AUTO: 0.2 %
BUN SERPL-MCNC: 15 MG/DL (ref 6–23)
CALCIUM SERPL-MCNC: 8.9 MG/DL (ref 8.6–10.3)
CHLORIDE SERPL-SCNC: 104 MMOL/L (ref 98–107)
CO2 SERPL-SCNC: 26 MMOL/L (ref 21–32)
CREAT SERPL-MCNC: 1.11 MG/DL (ref 0.5–1.3)
CRP SERPL-MCNC: 0.13 MG/DL
EOSINOPHIL # BLD AUTO: 0.13 X10*3/UL (ref 0–0.7)
EOSINOPHIL NFR BLD AUTO: 1.1 %
ERYTHROCYTE [DISTWIDTH] IN BLOOD BY AUTOMATED COUNT: 12.6 % (ref 11.5–14.5)
GFR SERPL CREATININE-BSD FRML MDRD: 79 ML/MIN/1.73M*2
GLUCOSE BLD MANUAL STRIP-MCNC: 118 MG/DL (ref 74–99)
GLUCOSE BLD MANUAL STRIP-MCNC: 118 MG/DL (ref 74–99)
GLUCOSE BLD MANUAL STRIP-MCNC: 69 MG/DL (ref 74–99)
GLUCOSE BLD MANUAL STRIP-MCNC: 82 MG/DL (ref 74–99)
GLUCOSE SERPL-MCNC: 79 MG/DL (ref 74–99)
HCT VFR BLD AUTO: 37.6 % (ref 41–52)
HGB BLD-MCNC: 13.1 G/DL (ref 13.5–17.5)
IMM GRANULOCYTES # BLD AUTO: 0.03 X10*3/UL (ref 0–0.7)
IMM GRANULOCYTES NFR BLD AUTO: 0.2 % (ref 0–0.9)
LACTATE SERPL-SCNC: 1.6 MMOL/L (ref 0.4–2)
LYMPHOCYTES # BLD AUTO: 0.98 X10*3/UL (ref 1.2–4.8)
LYMPHOCYTES NFR BLD AUTO: 8 %
MAGNESIUM SERPL-MCNC: 1.63 MG/DL (ref 1.6–2.4)
MCH RBC QN AUTO: 30.5 PG (ref 26–34)
MCHC RBC AUTO-ENTMCNC: 34.8 G/DL (ref 32–36)
MCV RBC AUTO: 87 FL (ref 80–100)
MONOCYTES # BLD AUTO: 0.65 X10*3/UL (ref 0.1–1)
MONOCYTES NFR BLD AUTO: 5.3 %
NEUTROPHILS # BLD AUTO: 10.43 X10*3/UL (ref 1.2–7.7)
NEUTROPHILS NFR BLD AUTO: 85.2 %
NRBC BLD-RTO: 0 /100 WBCS (ref 0–0)
PLATELET # BLD AUTO: 209 X10*3/UL (ref 150–450)
POTASSIUM SERPL-SCNC: 3.8 MMOL/L (ref 3.5–5.3)
RBC # BLD AUTO: 4.3 X10*6/UL (ref 4.5–5.9)
SODIUM SERPL-SCNC: 138 MMOL/L (ref 136–145)
WBC # BLD AUTO: 12.3 X10*3/UL (ref 4.4–11.3)

## 2024-01-07 PROCEDURE — 99285 EMERGENCY DEPT VISIT HI MDM: CPT | Performed by: STUDENT IN AN ORGANIZED HEALTH CARE EDUCATION/TRAINING PROGRAM

## 2024-01-07 PROCEDURE — 87040 BLOOD CULTURE FOR BACTERIA: CPT | Mod: ELYLAB | Performed by: STUDENT IN AN ORGANIZED HEALTH CARE EDUCATION/TRAINING PROGRAM

## 2024-01-07 PROCEDURE — 83735 ASSAY OF MAGNESIUM: CPT | Performed by: STUDENT IN AN ORGANIZED HEALTH CARE EDUCATION/TRAINING PROGRAM

## 2024-01-07 PROCEDURE — 85025 COMPLETE CBC W/AUTO DIFF WBC: CPT | Performed by: STUDENT IN AN ORGANIZED HEALTH CARE EDUCATION/TRAINING PROGRAM

## 2024-01-07 PROCEDURE — 82947 ASSAY GLUCOSE BLOOD QUANT: CPT | Mod: 91

## 2024-01-07 PROCEDURE — 72125 CT NECK SPINE W/O DYE: CPT

## 2024-01-07 PROCEDURE — 70450 CT HEAD/BRAIN W/O DYE: CPT | Performed by: RADIOLOGY

## 2024-01-07 PROCEDURE — 36415 COLL VENOUS BLD VENIPUNCTURE: CPT | Performed by: STUDENT IN AN ORGANIZED HEALTH CARE EDUCATION/TRAINING PROGRAM

## 2024-01-07 PROCEDURE — 70450 CT HEAD/BRAIN W/O DYE: CPT

## 2024-01-07 PROCEDURE — 93005 ELECTROCARDIOGRAM TRACING: CPT

## 2024-01-07 PROCEDURE — 82947 ASSAY GLUCOSE BLOOD QUANT: CPT

## 2024-01-07 PROCEDURE — 80048 BASIC METABOLIC PNL TOTAL CA: CPT | Performed by: STUDENT IN AN ORGANIZED HEALTH CARE EDUCATION/TRAINING PROGRAM

## 2024-01-07 PROCEDURE — 86140 C-REACTIVE PROTEIN: CPT | Performed by: STUDENT IN AN ORGANIZED HEALTH CARE EDUCATION/TRAINING PROGRAM

## 2024-01-07 PROCEDURE — 83605 ASSAY OF LACTIC ACID: CPT | Performed by: STUDENT IN AN ORGANIZED HEALTH CARE EDUCATION/TRAINING PROGRAM

## 2024-01-07 PROCEDURE — 80377 DRUG/SUBSTANCE NOS 7/MORE: CPT | Performed by: NURSE PRACTITIONER

## 2024-01-07 PROCEDURE — G0378 HOSPITAL OBSERVATION PER HR: HCPCS

## 2024-01-07 PROCEDURE — 72125 CT NECK SPINE W/O DYE: CPT | Performed by: RADIOLOGY

## 2024-01-07 RX ORDER — DEXTROSE MONOHYDRATE 100 MG/ML
125 INJECTION, SOLUTION INTRAVENOUS CONTINUOUS
Status: ACTIVE | OUTPATIENT
Start: 2024-01-07 | End: 2024-01-07

## 2024-01-07 ASSESSMENT — COLUMBIA-SUICIDE SEVERITY RATING SCALE - C-SSRS
6. HAVE YOU EVER DONE ANYTHING, STARTED TO DO ANYTHING, OR PREPARED TO DO ANYTHING TO END YOUR LIFE?: NO
1. IN THE PAST MONTH, HAVE YOU WISHED YOU WERE DEAD OR WISHED YOU COULD GO TO SLEEP AND NOT WAKE UP?: NO
2. HAVE YOU ACTUALLY HAD ANY THOUGHTS OF KILLING YOURSELF?: NO

## 2024-01-07 ASSESSMENT — PAIN - FUNCTIONAL ASSESSMENT: PAIN_FUNCTIONAL_ASSESSMENT: 0-10

## 2024-01-07 ASSESSMENT — LIFESTYLE VARIABLES
EVER FELT BAD OR GUILTY ABOUT YOUR DRINKING: NO
REASON UNABLE TO ASSESS: NO
HAVE PEOPLE ANNOYED YOU BY CRITICIZING YOUR DRINKING: NO
HAVE YOU EVER FELT YOU SHOULD CUT DOWN ON YOUR DRINKING: NO
EVER HAD A DRINK FIRST THING IN THE MORNING TO STEADY YOUR NERVES TO GET RID OF A HANGOVER: NO

## 2024-01-07 ASSESSMENT — PAIN SCALES - GENERAL: PAINLEVEL_OUTOF10: 0 - NO PAIN

## 2024-01-08 LAB
ANION GAP SERPL CALC-SCNC: 9 MMOL/L (ref 10–20)
BUN SERPL-MCNC: 14 MG/DL (ref 6–23)
CALCIUM SERPL-MCNC: 8.8 MG/DL (ref 8.6–10.3)
CHLORIDE SERPL-SCNC: 103 MMOL/L (ref 98–107)
CO2 SERPL-SCNC: 27 MMOL/L (ref 21–32)
CORTIS AM PEAK SERPL-MSCNC: 9.1 UG/DL (ref 5–20)
CREAT SERPL-MCNC: 1.09 MG/DL (ref 0.5–1.3)
ERYTHROCYTE [DISTWIDTH] IN BLOOD BY AUTOMATED COUNT: 12.4 % (ref 11.5–14.5)
GFR SERPL CREATININE-BSD FRML MDRD: 81 ML/MIN/1.73M*2
GLUCOSE BLD MANUAL STRIP-MCNC: 156 MG/DL (ref 74–99)
GLUCOSE BLD MANUAL STRIP-MCNC: 164 MG/DL (ref 74–99)
GLUCOSE BLD MANUAL STRIP-MCNC: 169 MG/DL (ref 74–99)
GLUCOSE BLD MANUAL STRIP-MCNC: 188 MG/DL (ref 74–99)
GLUCOSE BLD MANUAL STRIP-MCNC: 262 MG/DL (ref 74–99)
GLUCOSE BLD MANUAL STRIP-MCNC: 323 MG/DL (ref 74–99)
GLUCOSE SERPL-MCNC: 160 MG/DL (ref 74–99)
HCT VFR BLD AUTO: 35.2 % (ref 41–52)
HGB BLD-MCNC: 12.5 G/DL (ref 13.5–17.5)
HOLD SPECIMEN: NORMAL
MCH RBC QN AUTO: 30.3 PG (ref 26–34)
MCHC RBC AUTO-ENTMCNC: 35.5 G/DL (ref 32–36)
MCV RBC AUTO: 85 FL (ref 80–100)
NRBC BLD-RTO: 0 /100 WBCS (ref 0–0)
PLATELET # BLD AUTO: 225 X10*3/UL (ref 150–450)
POTASSIUM SERPL-SCNC: 4.1 MMOL/L (ref 3.5–5.3)
RBC # BLD AUTO: 4.12 X10*6/UL (ref 4.5–5.9)
SODIUM SERPL-SCNC: 135 MMOL/L (ref 136–145)
WBC # BLD AUTO: 9.1 X10*3/UL (ref 4.4–11.3)

## 2024-01-08 PROCEDURE — G0378 HOSPITAL OBSERVATION PER HR: HCPCS

## 2024-01-08 PROCEDURE — 85027 COMPLETE CBC AUTOMATED: CPT | Performed by: NURSE PRACTITIONER

## 2024-01-08 PROCEDURE — 82947 ASSAY GLUCOSE BLOOD QUANT: CPT

## 2024-01-08 PROCEDURE — 2500000001 HC RX 250 WO HCPCS SELF ADMINISTERED DRUGS (ALT 637 FOR MEDICARE OP): Performed by: STUDENT IN AN ORGANIZED HEALTH CARE EDUCATION/TRAINING PROGRAM

## 2024-01-08 PROCEDURE — 80048 BASIC METABOLIC PNL TOTAL CA: CPT | Performed by: NURSE PRACTITIONER

## 2024-01-08 PROCEDURE — 99232 SBSQ HOSP IP/OBS MODERATE 35: CPT | Performed by: NURSE PRACTITIONER

## 2024-01-08 PROCEDURE — 2500000004 HC RX 250 GENERAL PHARMACY W/ HCPCS (ALT 636 FOR OP/ED): Performed by: NURSE PRACTITIONER

## 2024-01-08 PROCEDURE — 36415 COLL VENOUS BLD VENIPUNCTURE: CPT | Performed by: NURSE PRACTITIONER

## 2024-01-08 PROCEDURE — 99222 1ST HOSP IP/OBS MODERATE 55: CPT | Performed by: NURSE PRACTITIONER

## 2024-01-08 PROCEDURE — 82533 TOTAL CORTISOL: CPT | Mod: ELYLAB | Performed by: NURSE PRACTITIONER

## 2024-01-08 PROCEDURE — 2500000002 HC RX 250 W HCPCS SELF ADMINISTERED DRUGS (ALT 637 FOR MEDICARE OP, ALT 636 FOR OP/ED): Performed by: NURSE PRACTITIONER

## 2024-01-08 PROCEDURE — 1200000002 HC GENERAL ROOM WITH TELEMETRY DAILY

## 2024-01-08 RX ORDER — INSULIN LISPRO 100 [IU]/ML
0-5 INJECTION, SOLUTION INTRAVENOUS; SUBCUTANEOUS
Status: DISCONTINUED | OUTPATIENT
Start: 2024-01-08 | End: 2024-01-08

## 2024-01-08 RX ORDER — ACETAMINOPHEN 160 MG/5ML
650 SOLUTION ORAL EVERY 4 HOURS PRN
Status: DISCONTINUED | OUTPATIENT
Start: 2024-01-08 | End: 2024-01-09 | Stop reason: HOSPADM

## 2024-01-08 RX ORDER — TALC
3 POWDER (GRAM) TOPICAL DAILY
Status: DISCONTINUED | OUTPATIENT
Start: 2024-01-08 | End: 2024-01-09 | Stop reason: HOSPADM

## 2024-01-08 RX ORDER — ACETAMINOPHEN 650 MG/1
650 SUPPOSITORY RECTAL EVERY 4 HOURS PRN
Status: DISCONTINUED | OUTPATIENT
Start: 2024-01-08 | End: 2024-01-09 | Stop reason: HOSPADM

## 2024-01-08 RX ORDER — INSULIN LISPRO 100 [IU]/ML
6 INJECTION, SOLUTION INTRAVENOUS; SUBCUTANEOUS
Status: DISCONTINUED | OUTPATIENT
Start: 2024-01-09 | End: 2024-01-09 | Stop reason: HOSPADM

## 2024-01-08 RX ORDER — BENZTROPINE MESYLATE 1 MG/1
0.5 TABLET ORAL 2 TIMES DAILY
Status: DISCONTINUED | OUTPATIENT
Start: 2024-01-08 | End: 2024-01-08

## 2024-01-08 RX ORDER — DEXTROSE MONOHYDRATE 100 MG/ML
0.3 INJECTION, SOLUTION INTRAVENOUS ONCE AS NEEDED
Status: DISCONTINUED | OUTPATIENT
Start: 2024-01-08 | End: 2024-01-09 | Stop reason: HOSPADM

## 2024-01-08 RX ORDER — POLYETHYLENE GLYCOL 3350 17 G/17G
17 POWDER, FOR SOLUTION ORAL DAILY
Status: DISCONTINUED | OUTPATIENT
Start: 2024-01-08 | End: 2024-01-09 | Stop reason: HOSPADM

## 2024-01-08 RX ORDER — ONDANSETRON HYDROCHLORIDE 2 MG/ML
4 INJECTION, SOLUTION INTRAVENOUS EVERY 8 HOURS PRN
Status: DISCONTINUED | OUTPATIENT
Start: 2024-01-08 | End: 2024-01-09 | Stop reason: HOSPADM

## 2024-01-08 RX ORDER — ZIPRASIDONE HYDROCHLORIDE 40 MG/1
160 CAPSULE ORAL NIGHTLY
Status: DISCONTINUED | OUTPATIENT
Start: 2024-01-08 | End: 2024-01-09 | Stop reason: HOSPADM

## 2024-01-08 RX ORDER — INSULIN LISPRO 100 [IU]/ML
0-10 INJECTION, SOLUTION INTRAVENOUS; SUBCUTANEOUS
Status: DISCONTINUED | OUTPATIENT
Start: 2024-01-09 | End: 2024-01-09 | Stop reason: HOSPADM

## 2024-01-08 RX ORDER — ONDANSETRON 4 MG/1
4 TABLET, FILM COATED ORAL EVERY 8 HOURS PRN
Status: DISCONTINUED | OUTPATIENT
Start: 2024-01-08 | End: 2024-01-09 | Stop reason: HOSPADM

## 2024-01-08 RX ORDER — ACETAMINOPHEN 325 MG/1
650 TABLET ORAL EVERY 4 HOURS PRN
Status: DISCONTINUED | OUTPATIENT
Start: 2024-01-08 | End: 2024-01-09 | Stop reason: HOSPADM

## 2024-01-08 RX ORDER — DEXTROSE 50 % IN WATER (D50W) INTRAVENOUS SYRINGE
25
Status: DISCONTINUED | OUTPATIENT
Start: 2024-01-08 | End: 2024-01-09 | Stop reason: HOSPADM

## 2024-01-08 RX ORDER — FLUOXETINE HYDROCHLORIDE 20 MG/1
40 CAPSULE ORAL DAILY
Status: DISCONTINUED | OUTPATIENT
Start: 2024-01-08 | End: 2024-01-09 | Stop reason: HOSPADM

## 2024-01-08 RX ORDER — ZIPRASIDONE HYDROCHLORIDE 80 MG/1
160 CAPSULE ORAL
COMMUNITY

## 2024-01-08 RX ORDER — DULAGLUTIDE 3 MG/.5ML
3 INJECTION, SOLUTION SUBCUTANEOUS
COMMUNITY
End: 2024-01-09 | Stop reason: HOSPADM

## 2024-01-08 RX ORDER — BENZTROPINE MESYLATE 1 MG/1
0.5 TABLET ORAL 2 TIMES DAILY
Status: DISCONTINUED | OUTPATIENT
Start: 2024-01-08 | End: 2024-01-09 | Stop reason: HOSPADM

## 2024-01-08 RX ORDER — FLUOXETINE HYDROCHLORIDE 40 MG/1
40 CAPSULE ORAL DAILY
COMMUNITY
End: 2024-01-18 | Stop reason: WASHOUT

## 2024-01-08 RX ORDER — BENZTROPINE MESYLATE 0.5 MG/1
0.5 TABLET ORAL 2 TIMES DAILY
COMMUNITY

## 2024-01-08 RX ORDER — ZIPRASIDONE HYDROCHLORIDE 40 MG/1
160 CAPSULE ORAL NIGHTLY
Status: DISCONTINUED | OUTPATIENT
Start: 2024-01-08 | End: 2024-01-08

## 2024-01-08 RX ADMIN — FLUOXETINE HYDROCHLORIDE 40 MG: 20 CAPSULE ORAL at 08:03

## 2024-01-08 RX ADMIN — ZIPRASIDONE HYDROCHLORIDE 160 MG: 40 CAPSULE ORAL at 20:28

## 2024-01-08 RX ADMIN — INSULIN LISPRO 3 UNITS: 100 INJECTION, SOLUTION INTRAVENOUS; SUBCUTANEOUS at 18:20

## 2024-01-08 RX ADMIN — ZIPRASIDONE HYDROCHLORIDE 160 MG: 40 CAPSULE ORAL at 04:56

## 2024-01-08 RX ADMIN — POLYETHYLENE GLYCOL 3350 17 G: 17 POWDER, FOR SOLUTION ORAL at 08:03

## 2024-01-08 RX ADMIN — BENZTROPINE MESYLATE 0.5 MG: 1 TABLET ORAL at 04:56

## 2024-01-08 RX ADMIN — BENZTROPINE MESYLATE 0.5 MG: 1 TABLET ORAL at 20:29

## 2024-01-08 RX ADMIN — INSULIN LISPRO 4 UNITS: 100 INJECTION, SOLUTION INTRAVENOUS; SUBCUTANEOUS at 20:52

## 2024-01-08 SDOH — SOCIAL STABILITY: SOCIAL NETWORK: IN A TYPICAL WEEK, HOW MANY TIMES DO YOU TALK ON THE PHONE WITH FAMILY, FRIENDS, OR NEIGHBORS?: PATIENT DECLINED

## 2024-01-08 SDOH — HEALTH STABILITY: MENTAL HEALTH
STRESS IS WHEN SOMEONE FEELS TENSE, NERVOUS, ANXIOUS, OR CAN'T SLEEP AT NIGHT BECAUSE THEIR MIND IS TROUBLED. HOW STRESSED ARE YOU?: PATIENT DECLINED

## 2024-01-08 SDOH — SOCIAL STABILITY: SOCIAL INSECURITY: WERE YOU ABLE TO COMPLETE ALL THE BEHAVIORAL HEALTH SCREENINGS?: YES

## 2024-01-08 SDOH — SOCIAL STABILITY: SOCIAL NETWORK: ARE YOU MARRIED, WIDOWED, DIVORCED, SEPARATED, NEVER MARRIED, OR LIVING WITH A PARTNER?: PATIENT DECLINED

## 2024-01-08 SDOH — SOCIAL STABILITY: SOCIAL INSECURITY: DOES ANYONE TRY TO KEEP YOU FROM HAVING/CONTACTING OTHER FRIENDS OR DOING THINGS OUTSIDE YOUR HOME?: NO

## 2024-01-08 SDOH — SOCIAL STABILITY: SOCIAL INSECURITY: WITHIN THE LAST YEAR, HAVE YOU BEEN AFRAID OF YOUR PARTNER OR EX-PARTNER?: PATIENT DECLINED

## 2024-01-08 SDOH — SOCIAL STABILITY: SOCIAL INSECURITY
WITHIN THE LAST YEAR, HAVE YOU BEEN KICKED, HIT, SLAPPED, OR OTHERWISE PHYSICALLY HURT BY YOUR PARTNER OR EX-PARTNER?: PATIENT DECLINED

## 2024-01-08 SDOH — SOCIAL STABILITY: SOCIAL INSECURITY: DO YOU FEEL UNSAFE GOING BACK TO THE PLACE WHERE YOU ARE LIVING?: NO

## 2024-01-08 SDOH — ECONOMIC STABILITY: INCOME INSECURITY
IN THE PAST 12 MONTHS, HAS THE ELECTRIC, GAS, OIL, OR WATER COMPANY THREATENED TO SHUT OFF SERVICE IN YOUR HOME?: PATIENT DECLINED

## 2024-01-08 SDOH — SOCIAL STABILITY: SOCIAL INSECURITY
WITHIN THE LAST YEAR, HAVE TO BEEN RAPED OR FORCED TO HAVE ANY KIND OF SEXUAL ACTIVITY BY YOUR PARTNER OR EX-PARTNER?: PATIENT DECLINED

## 2024-01-08 SDOH — HEALTH STABILITY: PHYSICAL HEALTH: ON AVERAGE, HOW MANY DAYS PER WEEK DO YOU ENGAGE IN MODERATE TO STRENUOUS EXERCISE (LIKE A BRISK WALK)?: 2 DAYS

## 2024-01-08 SDOH — HEALTH STABILITY: PHYSICAL HEALTH: ON AVERAGE, HOW MANY MINUTES DO YOU ENGAGE IN EXERCISE AT THIS LEVEL?: 10 MIN

## 2024-01-08 SDOH — SOCIAL STABILITY: SOCIAL INSECURITY: ARE THERE ANY APPARENT SIGNS OF INJURIES/BEHAVIORS THAT COULD BE RELATED TO ABUSE/NEGLECT?: NO

## 2024-01-08 SDOH — SOCIAL STABILITY: SOCIAL INSECURITY
WITHIN THE LAST YEAR, HAVE YOU BEEN HUMILIATED OR EMOTIONALLY ABUSED IN OTHER WAYS BY YOUR PARTNER OR EX-PARTNER?: PATIENT DECLINED

## 2024-01-08 SDOH — SOCIAL STABILITY: SOCIAL NETWORK
DO YOU BELONG TO ANY CLUBS OR ORGANIZATIONS SUCH AS CHURCH GROUPS UNIONS, FRATERNAL OR ATHLETIC GROUPS, OR SCHOOL GROUPS?: PATIENT DECLINED

## 2024-01-08 SDOH — ECONOMIC STABILITY: FOOD INSECURITY: WITHIN THE PAST 12 MONTHS, YOU WORRIED THAT YOUR FOOD WOULD RUN OUT BEFORE YOU GOT MONEY TO BUY MORE.: PATIENT DECLINED

## 2024-01-08 SDOH — SOCIAL STABILITY: SOCIAL INSECURITY: ARE YOU OR HAVE YOU BEEN THREATENED OR ABUSED PHYSICALLY, EMOTIONALLY, OR SEXUALLY BY ANYONE?: NO

## 2024-01-08 SDOH — ECONOMIC STABILITY: FOOD INSECURITY: WITHIN THE PAST 12 MONTHS, THE FOOD YOU BOUGHT JUST DIDN'T LAST AND YOU DIDN'T HAVE MONEY TO GET MORE.: PATIENT DECLINED

## 2024-01-08 SDOH — SOCIAL STABILITY: SOCIAL NETWORK: HOW OFTEN DO YOU ATTEND CHURCH OR RELIGIOUS SERVICES?: PATIENT DECLINED

## 2024-01-08 SDOH — SOCIAL STABILITY: SOCIAL INSECURITY: ABUSE: ADULT

## 2024-01-08 SDOH — SOCIAL STABILITY: SOCIAL INSECURITY: DO YOU FEEL ANYONE HAS EXPLOITED OR TAKEN ADVANTAGE OF YOU FINANCIALLY OR OF YOUR PERSONAL PROPERTY?: NO

## 2024-01-08 SDOH — SOCIAL STABILITY: SOCIAL INSECURITY: HAVE YOU HAD THOUGHTS OF HARMING ANYONE ELSE?: NO

## 2024-01-08 SDOH — SOCIAL STABILITY: SOCIAL INSECURITY: HAS ANYONE EVER THREATENED TO HURT YOUR FAMILY OR YOUR PETS?: NO

## 2024-01-08 SDOH — SOCIAL STABILITY: SOCIAL NETWORK: HOW OFTEN DO YOU GET TOGETHER WITH FRIENDS OR RELATIVES?: PATIENT DECLINED

## 2024-01-08 SDOH — SOCIAL STABILITY: SOCIAL NETWORK: HOW OFTEN DO YOU ATTENT MEETINGS OF THE CLUB OR ORGANIZATION YOU BELONG TO?: PATIENT DECLINED

## 2024-01-08 ASSESSMENT — COGNITIVE AND FUNCTIONAL STATUS - GENERAL
PATIENT BASELINE BEDBOUND: NO
DAILY ACTIVITIY SCORE: 24
MOBILITY SCORE: 24
DAILY ACTIVITIY SCORE: 24

## 2024-01-08 ASSESSMENT — LIFESTYLE VARIABLES
PRESCIPTION_ABUSE_PAST_12_MONTHS: NO
HOW OFTEN DO YOU HAVE 6 OR MORE DRINKS ON ONE OCCASION: NEVER
SKIP TO QUESTIONS 9-10: 1
SUBSTANCE_ABUSE_PAST_12_MONTHS: NO
HOW MANY STANDARD DRINKS CONTAINING ALCOHOL DO YOU HAVE ON A TYPICAL DAY: PATIENT DOES NOT DRINK
AUDIT-C TOTAL SCORE: 0
HOW OFTEN DO YOU HAVE A DRINK CONTAINING ALCOHOL: NEVER
AUDIT-C TOTAL SCORE: 0

## 2024-01-08 ASSESSMENT — ACTIVITIES OF DAILY LIVING (ADL)
DRESSING YOURSELF: INDEPENDENT
HEARING - RIGHT EAR: FUNCTIONAL
LACK_OF_TRANSPORTATION: PATIENT DECLINED
ADEQUATE_TO_COMPLETE_ADL: YES
PATIENT'S MEMORY ADEQUATE TO SAFELY COMPLETE DAILY ACTIVITIES?: YES
JUDGMENT_ADEQUATE_SAFELY_COMPLETE_DAILY_ACTIVITIES: YES
WALKS IN HOME: INDEPENDENT
GROOMING: INDEPENDENT
FEEDING YOURSELF: INDEPENDENT
TOILETING: INDEPENDENT
BATHING: INDEPENDENT
HEARING - LEFT EAR: FUNCTIONAL

## 2024-01-08 ASSESSMENT — PAIN - FUNCTIONAL ASSESSMENT: PAIN_FUNCTIONAL_ASSESSMENT: 0-10

## 2024-01-08 ASSESSMENT — PATIENT HEALTH QUESTIONNAIRE - PHQ9
2. FEELING DOWN, DEPRESSED OR HOPELESS: NOT AT ALL
1. LITTLE INTEREST OR PLEASURE IN DOING THINGS: NOT AT ALL
SUM OF ALL RESPONSES TO PHQ9 QUESTIONS 1 & 2: 0

## 2024-01-08 ASSESSMENT — PAIN SCALES - GENERAL
PAINLEVEL_OUTOF10: 0 - NO PAIN

## 2024-01-08 NOTE — PROGRESS NOTES
01/08/24 1718   Discharge Planning   Living Arrangements Spouse/significant other   Support Systems Spouse/significant other   Assistance Needed none   Type of Residence Private residence  (apt)   Do you have animals or pets at home? No   Who is requesting discharge planning? Patient   Home or Post Acute Services None   Patient expects to be discharged to: home   Does the patient need discharge transport arranged? Yes  (uber)   RoundTrip coordination needed? Yes     Met w/ pt and spouse. Confirms demo's. Will follow for DM management w/ dr rodrigez. Pt manages own medications, no barriers. Pt uses public transport, has ins transport for medical appts. Plan is home.

## 2024-01-08 NOTE — PROGRESS NOTES
PROGRESS NOTE    Subjective     Patient seen and examined today.  Resting in bed with no complaints of abdominal pain, nausea or diarrhea.  No headache, chest pain or shortness of breath.    Objective     Last Recorded Vitals    Visit Vitals  /81   Pulse 101   Temp 37.2 °C (99 °F)   Resp 18        3 Day Weight Change: Unable to Calculate     No intake or output data in the 24 hours ending 01/08/24 0921     Physical Exam  Vitals reviewed.   Constitutional:       General: He is not in acute distress.     Appearance: Normal appearance. He is not toxic-appearing.   HENT:      Head: Normocephalic and atraumatic.      Nose: Nose normal.      Mouth/Throat:      Mouth: Mucous membranes are dry.      Pharynx: Oropharynx is clear.   Eyes:      Extraocular Movements: Extraocular movements intact.      Pupils: Pupils are equal, round, and reactive to light.   Cardiovascular:      Rate and Rhythm: Normal rate and regular rhythm.      Pulses: Normal pulses.      Heart sounds: Normal heart sounds.   Pulmonary:      Effort: Pulmonary effort is normal.      Breath sounds: Normal breath sounds.   Abdominal:      General: Abdomen is flat. Bowel sounds are normal.      Palpations: Abdomen is soft.   Musculoskeletal:         General: Normal range of motion.      Cervical back: Normal range of motion and neck supple.   Skin:     General: Skin is warm and dry.      Capillary Refill: Capillary refill takes less than 2 seconds.   Neurological:      General: No focal deficit present.      Mental Status: He is alert and oriented to person, place, and time. Mental status is at baseline.   Psychiatric:         Attention and Perception: Attention and perception normal.         Mood and Affect: Mood normal.         Speech: Speech normal.         Behavior: Behavior normal. Behavior is cooperative.         Thought Content: Thought content normal.         Cognition and Memory: Cognition and memory normal.     Scheduled  medications  benztropine, 0.5 mg, oral, BID  FLUoxetine, 40 mg, oral, Daily  melatonin, 3 mg, oral, Daily  polyethylene glycol, 17 g, oral, Daily  ziprasidone, 160 mg, oral, Nightly      Continuous medications     PRN medications  PRN medications: acetaminophen **OR** acetaminophen **OR** acetaminophen, dextrose 10 % in water (D10W), dextrose, glucagon, ondansetron **OR** ondansetron       Relevant Results    Results for orders placed or performed during the hospital encounter of 01/07/24 (from the past 96 hour(s))   POCT GLUCOSE   Result Value Ref Range    POCT Glucose 69 (L) 74 - 99 mg/dL   CBC and Auto Differential   Result Value Ref Range    WBC 12.3 (H) 4.4 - 11.3 x10*3/uL    nRBC 0.0 0.0 - 0.0 /100 WBCs    RBC 4.30 (L) 4.50 - 5.90 x10*6/uL    Hemoglobin 13.1 (L) 13.5 - 17.5 g/dL    Hematocrit 37.6 (L) 41.0 - 52.0 %    MCV 87 80 - 100 fL    MCH 30.5 26.0 - 34.0 pg    MCHC 34.8 32.0 - 36.0 g/dL    RDW 12.6 11.5 - 14.5 %    Platelets 209 150 - 450 x10*3/uL    Neutrophils % 85.2 40.0 - 80.0 %    Immature Granulocytes %, Automated 0.2 0.0 - 0.9 %    Lymphocytes % 8.0 13.0 - 44.0 %    Monocytes % 5.3 2.0 - 10.0 %    Eosinophils % 1.1 0.0 - 6.0 %    Basophils % 0.2 0.0 - 2.0 %    Neutrophils Absolute 10.43 (H) 1.20 - 7.70 x10*3/uL    Immature Granulocytes Absolute, Automated 0.03 0.00 - 0.70 x10*3/uL    Lymphocytes Absolute 0.98 (L) 1.20 - 4.80 x10*3/uL    Monocytes Absolute 0.65 0.10 - 1.00 x10*3/uL    Eosinophils Absolute 0.13 0.00 - 0.70 x10*3/uL    Basophils Absolute 0.03 0.00 - 0.10 x10*3/uL   Basic metabolic panel   Result Value Ref Range    Glucose 79 74 - 99 mg/dL    Sodium 138 136 - 145 mmol/L    Potassium 3.8 3.5 - 5.3 mmol/L    Chloride 104 98 - 107 mmol/L    Bicarbonate 26 21 - 32 mmol/L    Anion Gap 12 10 - 20 mmol/L    Urea Nitrogen 15 6 - 23 mg/dL    Creatinine 1.11 0.50 - 1.30 mg/dL    eGFR 79 >60 mL/min/1.73m*2    Calcium 8.9 8.6 - 10.3 mg/dL   Magnesium   Result Value Ref Range    Magnesium 1.63  1.60 - 2.40 mg/dL   Lactate   Result Value Ref Range    Lactate 1.6 0.4 - 2.0 mmol/L   C-reactive protein   Result Value Ref Range    C-Reactive Protein 0.13 <1.00 mg/dL   POCT GLUCOSE   Result Value Ref Range    POCT Glucose 118 (H) 74 - 99 mg/dL   Blood Culture    Specimen: Peripheral Venipuncture; Blood culture   Result Value Ref Range    Blood Culture Loaded on Instrument - Culture in progress    POCT GLUCOSE   Result Value Ref Range    POCT Glucose 82 74 - 99 mg/dL   ECG 12 lead   Result Value Ref Range    Ventricular Rate 96 BPM    Atrial Rate 96 BPM    MT Interval 136 ms    QRS Duration 74 ms    QT Interval 368 ms    QTC Calculation(Bazett) 464 ms    P Axis 64 degrees    R Axis 43 degrees    T Axis 58 degrees    QRS Count 16 beats    Q Onset 225 ms    P Onset 157 ms    P Offset 205 ms    T Offset 409 ms    QTC Fredericia 430 ms   Blood Culture    Specimen: Peripheral Venipuncture; Blood culture   Result Value Ref Range    Blood Culture Loaded on Instrument - Culture in progress    POCT GLUCOSE   Result Value Ref Range    POCT Glucose 118 (H) 74 - 99 mg/dL   Cortisol AM   Result Value Ref Range    Cortisol  A.M. 9.1 5.0 - 20.0 ug/dL   POCT GLUCOSE   Result Value Ref Range    POCT Glucose 164 (H) 74 - 99 mg/dL   POCT GLUCOSE   Result Value Ref Range    POCT Glucose 188 (H) 74 - 99 mg/dL   CBC   Result Value Ref Range    WBC 9.1 4.4 - 11.3 x10*3/uL    nRBC 0.0 0.0 - 0.0 /100 WBCs    RBC 4.12 (L) 4.50 - 5.90 x10*6/uL    Hemoglobin 12.5 (L) 13.5 - 17.5 g/dL    Hematocrit 35.2 (L) 41.0 - 52.0 %    MCV 85 80 - 100 fL    MCH 30.3 26.0 - 34.0 pg    MCHC 35.5 32.0 - 36.0 g/dL    RDW 12.4 11.5 - 14.5 %    Platelets 225 150 - 450 x10*3/uL   Basic metabolic panel   Result Value Ref Range    Glucose 160 (H) 74 - 99 mg/dL    Sodium 135 (L) 136 - 145 mmol/L    Potassium 4.1 3.5 - 5.3 mmol/L    Chloride 103 98 - 107 mmol/L    Bicarbonate 27 21 - 32 mmol/L    Anion Gap 9 (L) 10 - 20 mmol/L    Urea Nitrogen 14 6 - 23 mg/dL     Creatinine 1.09 0.50 - 1.30 mg/dL    eGFR 81 >60 mL/min/1.73m*2    Calcium 8.8 8.6 - 10.3 mg/dL   SST TOP   Result Value Ref Range    Extra Tube Hold for add-ons.    POCT GLUCOSE   Result Value Ref Range    POCT Glucose 156 (H) 74 - 99 mg/dL      Assessment and Plan     Principal Problem:    Hypoglycemia     #Hypoglycemia   #DM II  Last dose of Trulicity was 1/6/24   Denies any insulin or oral agent use  Hypoglycemia protocol   Accu checks q 2 hours x 3 and then AC & HS   Endocrinology following.  Insulin regimen being adjusted.  Endocrinology recommending 1 more night of observation with adjustments of insulin regimen and once remained stable can be discharged recommend insulin regimen tomorrow.     #Near syncope   D/t hypoglycemia  CT head neg for acute findings      #HTN  #HLD  #LOYDA  Resume home meds      #DVTp  Chemical prophylaxis not indicated  SCD's only     Plan of care was discussed extensively with patient. Patient verbalized understanding through teach back method. All questions and concerns addressed upon examination.     Of note, this documentation is completed using the Dragon Dictation system (voice recognition software). There may be spelling and/or grammatical errors that were not corrected prior to final submission

## 2024-01-08 NOTE — HOSPITAL COURSE
Carlyle Houser is a 53 y.o. male with past medical history significant for diabetes mellitus type 2, osteomyelitis left foot, LOYDA on CPAP, polyneuropathy, asthma, Charcot deformity, diabetic retinopathy, hyperlipidemia, hypertension who presents to [facility] emergency department chief complaint of hypoglycemia. Recently lost large amount of weight to the point he does not require insulin any longer.  His endocrinologist put him on Trulicity.  He denies taking any insulin or oral agents in combination with the Trulicity.  Tonight after eating he took a shower and when walking from the shower to the living room he had a syncopal episode.  Wife checked his blood sugar and states it was 45.  She administered 2 shots of glucagon and called EMS.  Patient started to come around when EMS arrived.  He was brought to the emergency department for evaluation.  He denies any injury or pain at this time.  He does feel nervous.

## 2024-01-08 NOTE — ED PROVIDER NOTES
HPI: The patient is a 53-year-old man with history of diabetes who was recently started on Trulicity presents to the Emergency Department with a chief complaint of hypoglycemia.  Patient was reportedly in his normal state of health earlier today and ate a normal dinner and after dinner, but was in the shower when he lost consciousness.  Patient does not remember what happened but a family member heard the fall and checked up on him and checked his glucose which was low so EMS was called.  EMS treated the patient with IV dextrose and brought him in for further assessment and care.  Patient reports that at this time he has a mild headache but denies aches or pains elsewhere in his body and denies any recent flulike symptoms or rashes or lesions or unexplained joint pain or lower extremity pain.     PAST MEDICAL HISTORY:  as per HPI  ALLERGIES:  as per HPI  MEDICATIONS:  as per HPI  FAMILY HISTORY: as per HPI  SURGICAL HISTORY: as per HPI  SOCIAL HISTORY: as per HPI     PHYSICAL EXAM:  VITAL SIGNS: Nursing notes reviewed.  GENERAL:  Alert and interactive  EYES:   Eyes track.  ENT:  Airway patent.  RESPIRATORY:  Nonlabored breathing.  CARDIOVASCULAR:  [Regular rate.] [Regular rhythm.]  GASTROINTESTINAL:  No distension.  MUSCULOSKELETAL:  No deformity.  No rashes or lesions or areas of focal tenderness on the upper or lower extremities.  NEUROLOGICAL:  Awake.  SKIN:  Dry.  HEAD: Nontender, atraumatic  NECK: No C-spine tenderness step-offs deformities  BACK: No T or L spine tenderness step-offs deformities          MEDICAL DECISION MAKING (MDM):       DIAGNOSTIC STUDIES  Labs: BMP, blood cultures, CRP, CBC, lactic and magnesium  Radiology: CT head, CT C-spine     EKG  Per my interpretation:  Electrocardiogram ECG  RATE:  [Normal]  RHYTHM: [Sinus]  AXIS: [Normal]  INTERVALS: [Normal]  ST-T WAVE CHANGES: [Normal]  ABNORMALITIES/COMPARISON: Unchanged from most recent EKG November 24, 2022       REVIEW OF OLD RECORDS: I  reviewed the progress note from 10/31/2023     SUMMARY:  The patient is admitted to the Emergency Department for evaluation of above. Complete history and physical examination was performed by me.  Patient was hypoglycemic for EMS and while he was in triage and had his glucose checked again, he was once again hypoglycemic despite being treated.  Patient was given something to eat by the triage nurses and I ordered D10 once I learned that the patient was once again hypoglycemic.  Patient's repeat glucose normalized so he was not initially given the D10 but I did order every hour point-of-care glucoses to make sure that he did not get hypoglycemic again without allowing us to treat him quickly.  Given his fall, I did get a CT of his head and C-spine given he was not the most reliable historian.  CT was negative for acute traumatic pathology.  No evidence of traumatic injuries elsewhere.  He initially reported to me that he was not on any other diabetes medications aside from Trulicity and Trulicity and isolation typically does not cause hypoglycemia so was worried about an occult infection.  No obvious signs of infection but I do want a get blood cultures and inflammatory markers.  I also got an EKG in case he had a syncopal episode by think this is less likely given his altered mental status for EMS and family resolved when his hypoglycemia was treated.  Review of a primary care visit note from 10/31/2023 shows that at that time he was on Trulicity and on insulin so it is possible the patient is still taking insulin despite not realizing it.  The patient and family did seem to be confused on the patient's medications so it is possible that they have been inadvertently giving him too much of his treatments which may have precipitated the hypoglycemia.  Given multiple episodes of hypoglycemia without a definitive cause, the patient will be observed overnight.     DIAGNOSIS:    Hypoglycemia  Fall     DISPOSITION:    1)  observation     Ishaan Kwon MD  01/07/24 [id number]

## 2024-01-08 NOTE — H&P
History Of Present Illness  Carlyle Houser is a 53 y.o. male with past medical history significant for diabetes mellitus type 2, osteomyelitis left foot, LOYDA on CPAP, polyneuropathy, asthma, Charcot deformity, diabetic retinopathy, hyperlipidemia, hypertension who presents to [facility] emergency department chief complaint of hypoglycemia. Recently lost large amount of weight to the point he does not require insulin any longer.  His endocrinologist put him on Trulicity.  He denies taking any insulin or oral agents in combination with the Trulicity.  Tonight after eating he took a shower and when walking from the shower to the living room he had a syncopal episode.  Wife checked his blood sugar and states it was 45.  She administered 2 shots of glucagon and called EMS.  Patient started to come around when EMS arrived.  He was brought to the emergency department for evaluation.  He denies any injury or pain at this time.  He does feel nervous.    Past Medical History  Past Medical History:   Diagnosis Date    Anxiety     Asthma     Diabetes mellitus (CMS/[facility])     Hypertension      Surgical History  He has no past surgical history on file.     Social History  He has no history on file for tobacco use, alcohol use, and drug use.    Family History  No family history on file.     Allergies  Patient has no known allergies.    Medications  No current facility-administered medications for this encounter.  No current outpatient medications on file.    Review of Systems   Neurological:  Positive for syncope.   All other systems reviewed and are negative.       Physical Exam  Vitals reviewed.   Constitutional:       General: He is not in acute distress.     Appearance: Normal appearance. He is not toxic-appearing.   HENT:      Head: Normocephalic and atraumatic.      Nose: Nose normal.      Mouth/Throat:      Mouth: Mucous membranes are dry.      Pharynx: Oropharynx is clear.   Eyes:      Extraocular Movements: Extraocular movements  intact.      Pupils: Pupils are equal, round, and reactive to light.   Cardiovascular:      Rate and Rhythm: Normal rate and regular rhythm.      Pulses: Normal pulses.      Heart sounds: Normal heart sounds.   Pulmonary:      Effort: Pulmonary effort is normal.      Breath sounds: Normal breath sounds.   Abdominal:      General: Abdomen is flat. Bowel sounds are normal.      Palpations: Abdomen is soft.   Musculoskeletal:         General: Normal range of motion.      Cervical back: Normal range of motion and neck supple.   Skin:     General: Skin is warm and dry.      Capillary Refill: Capillary refill takes less than 2 seconds.   Neurological:      General: No focal deficit present.      Mental Status: He is alert and oriented to person, place, and time. Mental status is at baseline.   Psychiatric:         Attention and Perception: Attention and perception normal.         Mood and Affect: Mood normal.         Speech: Speech normal.         Behavior: Behavior normal. Behavior is cooperative.         Thought Content: Thought content normal.         Cognition and Memory: Cognition and memory normal.     Last Recorded Vitals  /78 (BP Location: Right arm, Patient Position: Lying)   Pulse 94   Temp 35.8 °C (96.4 °F)   Resp 18   Wt 90.7 kg (200 lb)   SpO2 98%     Relevant Results  Results for orders placed or performed during the hospital encounter of 01/07/24 (from the past 24 hour(s))   POCT GLUCOSE   Result Value Ref Range    POCT Glucose 69 (L) 74 - 99 mg/dL   CBC and Auto Differential   Result Value Ref Range    WBC 12.3 (H) 4.4 - 11.3 x10*3/uL    nRBC 0.0 0.0 - 0.0 /100 WBCs    RBC 4.30 (L) 4.50 - 5.90 x10*6/uL    Hemoglobin 13.1 (L) 13.5 - 17.5 g/dL    Hematocrit 37.6 (L) 41.0 - 52.0 %    MCV 87 80 - 100 fL    MCH 30.5 26.0 - 34.0 pg    MCHC 34.8 32.0 - 36.0 g/dL    RDW 12.6 11.5 - 14.5 %    Platelets 209 150 - 450 x10*3/uL    Neutrophils % 85.2 40.0 - 80.0 %    Immature Granulocytes %, Automated 0.2  0.0 - 0.9 %    Lymphocytes % 8.0 13.0 - 44.0 %    Monocytes % 5.3 2.0 - 10.0 %    Eosinophils % 1.1 0.0 - 6.0 %    Basophils % 0.2 0.0 - 2.0 %    Neutrophils Absolute 10.43 (H) 1.20 - 7.70 x10*3/uL    Immature Granulocytes Absolute, Automated 0.03 0.00 - 0.70 x10*3/uL    Lymphocytes Absolute 0.98 (L) 1.20 - 4.80 x10*3/uL    Monocytes Absolute 0.65 0.10 - 1.00 x10*3/uL    Eosinophils Absolute 0.13 0.00 - 0.70 x10*3/uL    Basophils Absolute 0.03 0.00 - 0.10 x10*3/uL   Basic metabolic panel   Result Value Ref Range    Glucose 79 74 - 99 mg/dL    Sodium 138 136 - 145 mmol/L    Potassium 3.8 3.5 - 5.3 mmol/L    Chloride 104 98 - 107 mmol/L    Bicarbonate 26 21 - 32 mmol/L    Anion Gap 12 10 - 20 mmol/L    Urea Nitrogen 15 6 - 23 mg/dL    Creatinine 1.11 0.50 - 1.30 mg/dL    eGFR 79 >60 mL/min/1.73m*2    Calcium 8.9 8.6 - 10.3 mg/dL   Magnesium   Result Value Ref Range    Magnesium 1.63 1.60 - 2.40 mg/dL   Lactate   Result Value Ref Range    Lactate 1.6 0.4 - 2.0 mmol/L   C-reactive protein   Result Value Ref Range    C-Reactive Protein 0.13 <1.00 mg/dL   POCT GLUCOSE   Result Value Ref Range    POCT Glucose 118 (H) 74 - 99 mg/dL   POCT GLUCOSE   Result Value Ref Range    POCT Glucose 82 74 - 99 mg/dL   POCT GLUCOSE   Result Value Ref Range    POCT Glucose 118 (H) 74 - 99 mg/dL     CT head wo IV contrast    Result Date: 1/7/2024  Interpreted By:  Frederci Andrew, STUDY: CT HEAD WO IV CONTRAST; CT CERVICAL SPINE WO IV CONTRAST;  1/7/2024 10:21 pm   INDICATION: Signs/Symptoms:trauma; Signs/Symptoms:Trauma.   COMPARISON: 01/26/2022 head CT   ACCESSION NUMBER(S): [id number]; [id number]   ORDERING CLINICIAN: ASIF HARTMANN       1. No acute intracranial hemorrhage or mass effect. 2. No acute fracture or traumatic malalignment of the cervical spine.   Signed by: Frederic Andrew 1/7/2024 10:55 PM Dictation workstation:   [id number]    CT cervical spine wo IV contrast    Result Date: 1/7/2024  Interpreted By:   Frederic Andrew, STUDY: CT HEAD WO IV CONTRAST; CT CERVICAL SPINE WO IV CONTRAST;  1/7/2024 10:21 pm   INDICATION: Signs/Symptoms:trauma; Signs/Symptoms:Trauma.   COMPARISON: 01/26/2022 head CT   ACCESSION NUMBER(S): [id number]; [id number]   ORDERING CLINICIAN: ASIF HARTMANN       1. No acute intracranial hemorrhage or mass effect. 2. No acute fracture or traumatic malalignment of the cervical spine.   Signed by: Frederic Andrew 1/7/2024 10:55 PM Dictation workstation:   [id number]      Assessment/Plan   Principal Problem:    Hypoglycemia    #Hypoglycemia   #DM II  Last dose of Trulicity was 1/6/24   Denies any insulin or oral agent use  Consult Dr. Murphy  Hypoglycemia protocol   BG stable since arriving to  EM    Accu checks q 2 hours x 3 and then AC & HS     #Near syncope   D/t hypoglycemia  CT head neg for acute findings     #HTN  #HLD  #LOYDA  Resume home meds     #DVTp  Chemical prophylaxis not indicated  SCD's only     Annia Finch, APRN-CNP

## 2024-01-09 VITALS
HEART RATE: 71 BPM | BODY MASS INDEX: 31.6 KG/M2 | TEMPERATURE: 98.4 F | RESPIRATION RATE: 16 BRPM | HEIGHT: 66 IN | SYSTOLIC BLOOD PRESSURE: 111 MMHG | WEIGHT: 196.6 LBS | OXYGEN SATURATION: 98 % | DIASTOLIC BLOOD PRESSURE: 56 MMHG

## 2024-01-09 LAB
ANION GAP SERPL CALC-SCNC: 11 MMOL/L (ref 10–20)
BUN SERPL-MCNC: 14 MG/DL (ref 6–23)
CALCIUM SERPL-MCNC: 8.8 MG/DL (ref 8.6–10.3)
CHLORIDE SERPL-SCNC: 106 MMOL/L (ref 98–107)
CO2 SERPL-SCNC: 28 MMOL/L (ref 21–32)
CREAT SERPL-MCNC: 1.05 MG/DL (ref 0.5–1.3)
EGFRCR SERPLBLD CKD-EPI 2021: 84 ML/MIN/1.73M*2
ERYTHROCYTE [DISTWIDTH] IN BLOOD BY AUTOMATED COUNT: 12.6 % (ref 11.5–14.5)
GLUCOSE BLD MANUAL STRIP-MCNC: 158 MG/DL (ref 74–99)
GLUCOSE BLD MANUAL STRIP-MCNC: 173 MG/DL (ref 74–99)
GLUCOSE SERPL-MCNC: 167 MG/DL (ref 74–99)
HCT VFR BLD AUTO: 34.1 % (ref 41–52)
HGB BLD-MCNC: 11.8 G/DL (ref 13.5–17.5)
HOLD SPECIMEN: NORMAL
MCH RBC QN AUTO: 29.9 PG (ref 26–34)
MCHC RBC AUTO-ENTMCNC: 34.6 G/DL (ref 32–36)
MCV RBC AUTO: 87 FL (ref 80–100)
NRBC BLD-RTO: 0 /100 WBCS (ref 0–0)
PLATELET # BLD AUTO: 204 X10*3/UL (ref 150–450)
POTASSIUM SERPL-SCNC: 4.7 MMOL/L (ref 3.5–5.3)
RBC # BLD AUTO: 3.94 X10*6/UL (ref 4.5–5.9)
SODIUM SERPL-SCNC: 140 MMOL/L (ref 136–145)
WBC # BLD AUTO: 7.6 X10*3/UL (ref 4.4–11.3)

## 2024-01-09 PROCEDURE — 85027 COMPLETE CBC AUTOMATED: CPT | Performed by: NURSE PRACTITIONER

## 2024-01-09 PROCEDURE — 2500000002 HC RX 250 W HCPCS SELF ADMINISTERED DRUGS (ALT 637 FOR MEDICARE OP, ALT 636 FOR OP/ED): Performed by: INTERNAL MEDICINE

## 2024-01-09 PROCEDURE — 82947 ASSAY GLUCOSE BLOOD QUANT: CPT

## 2024-01-09 PROCEDURE — 82374 ASSAY BLOOD CARBON DIOXIDE: CPT | Performed by: NURSE PRACTITIONER

## 2024-01-09 PROCEDURE — 36415 COLL VENOUS BLD VENIPUNCTURE: CPT | Performed by: NURSE PRACTITIONER

## 2024-01-09 PROCEDURE — G0378 HOSPITAL OBSERVATION PER HR: HCPCS

## 2024-01-09 PROCEDURE — 2500000001 HC RX 250 WO HCPCS SELF ADMINISTERED DRUGS (ALT 637 FOR MEDICARE OP): Performed by: STUDENT IN AN ORGANIZED HEALTH CARE EDUCATION/TRAINING PROGRAM

## 2024-01-09 RX ORDER — INSULIN LISPRO 100 [IU]/ML
0-10 INJECTION, SOLUTION INTRAVENOUS; SUBCUTANEOUS
Qty: 12 ML | Refills: 0 | Status: SHIPPED | OUTPATIENT
Start: 2024-01-09 | End: 2024-02-08

## 2024-01-09 RX ORDER — INSULIN LISPRO 100 [IU]/ML
6 INJECTION, SOLUTION INTRAVENOUS; SUBCUTANEOUS
Qty: 5.4 ML | Refills: 0 | Status: SHIPPED | OUTPATIENT
Start: 2024-01-09 | End: 2024-02-08

## 2024-01-09 RX ADMIN — INSULIN LISPRO 6 UNITS: 100 INJECTION, SOLUTION INTRAVENOUS; SUBCUTANEOUS at 06:45

## 2024-01-09 RX ADMIN — BENZTROPINE MESYLATE 0.5 MG: 1 TABLET ORAL at 09:04

## 2024-01-09 RX ADMIN — INSULIN LISPRO 2 UNITS: 100 INJECTION, SOLUTION INTRAVENOUS; SUBCUTANEOUS at 06:44

## 2024-01-09 RX ADMIN — FLUOXETINE HYDROCHLORIDE 40 MG: 20 CAPSULE ORAL at 09:04

## 2024-01-09 RX ADMIN — INSULIN LISPRO 6 UNITS: 100 INJECTION, SOLUTION INTRAVENOUS; SUBCUTANEOUS at 11:25

## 2024-01-09 ASSESSMENT — PAIN - FUNCTIONAL ASSESSMENT: PAIN_FUNCTIONAL_ASSESSMENT: 0-10

## 2024-01-09 ASSESSMENT — PAIN SCALES - GENERAL: PAINLEVEL_OUTOF10: 0 - NO PAIN

## 2024-01-09 NOTE — CONSULTS
Inpatient consult to Endocrinology  Consult performed by: Lizeth Murphy MD  Consult ordered by: Annia Finch, APRN-CNP          Assessment/Plan     Diabetes mellitus  Hyperglycemia      I discussed about his hyperglycemia with the patient and his wife was at the bedside she says he was out of it and she had to give him glucagon to get better.  This morning his glucoses have been quite well above 100s and he still seems to be drowsy and sleepy and gets up unclear what he is saying.  He is frustrated he did not take insulin and then he stated he did or may have taken insulin as his glucose was high at bedtime.      The patient was taken off all insulins including his Lantus 22 units and Humalog 10 units per meal about 2 weeks ago.  He is only on Trulicity  Trulicity does not cause hypoglycemia when taken alone at this difficult to understand how he was so hypoglycemic that he was unarousable only on Trulicity.      Plan-  The patient will continue without Trulicity although his glucoses rise up much higher in the 300s and 400s in the day he may need more Humalog which is not clear if he does properly with his meals but needs to do it.  Will watch him until discharge and follow-up in the office    History Of Present Illness  Carlyle Houser is a 53 y.o. male with  has a past medical history of Anxiety, Asthma, Diabetes mellitus (CMS/HCC), and Hypertension. presenting with Hypoglycemia.  53-year-old male who is a patient of mine for several years has been on Lantus 22 units daily and Humalog 10 units per meal and Trulicity 1.5 mg once a week.  Due to hypoglycemias overnight in the 60s although he had glucose in the 300s during the middle of the day he was taken off the insulins completely about 2 weeks ago  The patient have a Dexcom continuous glucose monitor which is being monitored frequently every month at least for the last many months.  The patient could not have hypoglycemia only on Trulicity and when asked  today with his wife at the bedside he was quite unclear but then states he might of taken some insulin at bedtime because of high glucose.  He has a very poor understanding and I will very poor concept of what is going on around him including his wife also does not understand well what the patient does  I have a continuous glucose monitor download from the last week of what his glucoses were and which comes up to the 59 hours daily cannot do put him at a continuous nasal bone but the nausea lasted for a couple Hours.    With open continue external placement orchiectomy          Per HPI-    HPI: The patient is a 53-year-old man with history of diabetes who was recently started on Trulicity presents to the Emergency Department with a chief complaint of hypoglycemia.  Patient was reportedly in his normal state of health earlier today and ate a normal dinner and after dinner, but was in the shower when he lost consciousness.  Patient does not remember what happened but a family member heard the fall and checked up on him and checked his glucose which was low so EMS was called.  EMS treated the patient with IV dextrose and brought him in for further assessment and care.  Patient reports that at this time he has a mild headache but denies aches or pains elsewhere in his body and denies any recent flulike symptoms or rashes or lesions or unexplained joint pain or lower extremity pain.            Glucose    FS glucose            Random glucose    202        Fasting glucose            Blood glucose            POCT glucose  240 183  223 233      Potassium    Potassium, Blood    4.1        Medication Dosing    insulin glargine,hum.rec.anlog SUBQ (Units)  35   50       insulin lispro SUBQ (Units)  24 10  24 12      Insulins     .      Past Medical History  He has a past medical history of Anxiety, Asthma, Diabetes mellitus (CMS/[facility]), and Hypertension.    Surgical History  He has no past surgical history on file.     Social  History  He reports that he has never smoked. He has never used smokeless tobacco. He reports that he does not drink alcohol and does not use drugs.    Family History  No family history on file.     Allergies  Patient has no known allergies.    Review of Systems    Past Medical History:   Diagnosis Date    Anxiety     Asthma     Diabetes mellitus (CMS/HCC)     Hypertension        No past surgical history on file.    Social History     Socioeconomic History    Marital status:      Spouse name: Not on file    Number of children: Not on file    Years of education: Not on file    Highest education level: Not on file   Occupational History    Not on file   Tobacco Use    Smoking status: Never    Smokeless tobacco: Never   Vaping Use    Vaping Use: Never used   Substance and Sexual Activity    Alcohol use: Never    Drug use: Never    Sexual activity: Never   Other Topics Concern    Not on file   Social History Narrative    Not on file     Social Determinants of Health     Financial Resource Strain: Patient Declined (1/8/2024)    Overall Financial Resource Strain (CARDIA)     Difficulty of Paying Living Expenses: Patient declined   Food Insecurity: Patient Declined (1/8/2024)    Hunger Vital Sign     Worried About Running Out of Food in the Last Year: Patient declined     Ran Out of Food in the Last Year: Patient declined   Transportation Needs: Patient Declined (1/8/2024)    PRAPARE - Transportation     Lack of Transportation (Medical): Patient declined     Lack of Transportation (Non-Medical): Patient declined   Physical Activity: Insufficiently Active (1/8/2024)    Exercise Vital Sign     Days of Exercise per Week: 2 days     Minutes of Exercise per Session: 10 min   Stress: Patient Declined (1/8/2024)    [place] citizen [employer] - Occupational Stress Questionnaire     Feeling of Stress : Patient declined   Social Connections: Patient Declined (1/8/2024)    Social Connection and Isolation Panel  "[NHANES]     Frequency of Communication with Friends and Family: Patient declined     Frequency of Social Gatherings with Friends and Family: Patient declined     Attends Hinduism Services: Patient declined     Active Member of Clubs or Organizations: Patient declined     Attends Club or Organization Meetings: Patient declined     Marital Status: Patient declined   Intimate Partner Violence: Patient Declined (1/8/2024)    Humiliation, Afraid, Rape, and Kick questionnaire     Fear of Current or Ex-Partner: Patient declined     Emotionally Abused: Patient declined     Physically Abused: Patient declined     Sexually Abused: Patient declined   Housing Stability: Patient Declined (1/8/2024)    Housing Stability Vital Sign     Unable to Pay for Housing in the Last Year: Patient declined     Number of Places Lived in the Last Year: 0     Unstable Housing in the Last Year: Patient declined        Physical Exam     ROS, PMH, FH/SH, surgical history and allergies have been reviewed.    Last Recorded Vitals  Blood pressure 139/71, pulse 93, temperature 36.9 °C (98.4 °F), resp. rate 16, height 1.676 m (5' 6\"), weight 89.2 kg (196 lb 9.6 oz), SpO2 98 %.    Relevant Results  Results from last 7 days   Lab Units 01/08/24  1926 01/08/24  1652 01/08/24  1041 01/08/24  0546 01/08/24  0534 01/08/24  0403 01/07/24  2208 01/07/24  2201   POCT GLUCOSE mg/dL 323* 262* 169* 156*  --  188*   < >  --    GLUCOSE mg/dL  --   --   --   --  160*  --   --  79    < > = values in this interval not displayed.     Lab Results   Component Value Date    HGBA1C 7.1 (A) 08/23/2023          Storm Murphy MD FACE  Office phone - [phone]  Fax - [phone]  Address: [address] [facility] [address]  Address: [address] [id number]  1/8/2024  10:37 PM  "

## 2024-01-09 NOTE — CARE PLAN
The patient's goals for the shift include      The clinical goals for the shift include patient free from falls

## 2024-01-09 NOTE — DISCHARGE SUMMARY
Discharge Diagnosis  Hypoglycemia    Issues Requiring Follow-Up  Dr Murphy    Discharge Meds     Your medication list        ASK your doctor about these medications        Instructions Last Dose Given Next Dose Due   benztropine 0.5 mg tablet  Commonly known as: Cogentin           FLUoxetine 40 mg capsule  Commonly known as: PROzac           Trulicity 3 mg/0.5 mL pen injector  Generic drug: dulaglutide           ziprasidone 80 mg capsule  Commonly known as: Geodon                    Test Results Pending At Discharge  Pending Labs       Order Current Status    Sulfonylurea Hypoglycemics,Serum In process    Blood Culture Preliminary result    Blood Culture Preliminary result            Hospital Course  Carlyle Houser is a 53 y.o. male with past medical history significant for diabetes mellitus type 2, osteomyelitis left foot, LOYDA on CPAP, polyneuropathy, asthma, Charcot deformity, diabetic retinopathy, hyperlipidemia, hypertension who presents to [facility] emergency department chief complaint of hypoglycemia. Recently lost large amount of weight to the point he does not require insulin any longer.  His endocrinologist put him on Trulicity.  He denies taking any insulin or oral agents in combination with the Trulicity.  Tonight after eating he took a shower and when walking from the shower to the living room he had a syncopal episode.  Wife checked his blood sugar and states it was 45.  She administered 2 shots of glucagon and called EMS.  Patient started to come around when EMS arrived.  He was brought to the emergency department for evaluation.  He denies any injury or pain at this time.  He does feel nervous.  Patient was seen by Dr. Murphy endocrinology in which he stopped his Trulicity.  Dr. Murphy discussed glucose coverage with patient and wife.  Plan for discharge 6 units 3 times a day and follow-up with Dr. Murphy.  Continue to monitor sugars.  On day of discharge patient hemodynamically stable.  Patient will be  discharged home and would benefit from healthy at home program.    Pertinent Physical Exam At Time of Discharge  Physical Exam  Constitutional:       Appearance: Normal appearance.   HENT:      Head: Normocephalic.      Mouth/Throat:      Mouth: Mucous membranes are moist.   Eyes:      Pupils: Pupils are equal, round, and reactive to light.   Cardiovascular:      Rate and Rhythm: Normal rate and regular rhythm.      Heart sounds: Normal heart sounds, S1 normal and S2 normal.   Pulmonary:      Effort: Pulmonary effort is normal.      Breath sounds: Normal breath sounds.   Abdominal:      General: Bowel sounds are normal.      Palpations: Abdomen is soft.   Musculoskeletal:         General: Normal range of motion.      Cervical back: Neck supple.   Skin:     General: Skin is warm.   Neurological:      Mental Status: He is alert and oriented to person, place, and time.   Psychiatric:         Mood and Affect: Mood normal.         Behavior: Behavior normal.         Outpatient Follow-Up  No future appointments.      Carmina Bonilla, APRN-CNP

## 2024-01-09 NOTE — PROGRESS NOTES
Pt will discharge today home . Discussed the healthy at home program with pt./spouse, providers dr rubio and kin garcia cnp. Pt accepted. Program refrral sent by cnp. Pt aware. Pd today.

## 2024-01-09 NOTE — CARE PLAN
Problem: Diabetes  Goal: Achieve decreasing blood glucose levels by end of shift  Outcome: Progressing  Goal: Increase stability of blood glucose readings by end of shift  Outcome: Progressing  Goal: Decrease in ketones present in urine by end of shift  Outcome: Progressing  Goal: Maintain electrolyte levels within acceptable range throughout shift  Outcome: Progressing  Goal: Maintain glucose levels >70mg/dl to <250mg/dl throughout shift  Outcome: Progressing  Goal: No changes in neurological exam by end of shift  Outcome: Progressing  Goal: Learn about and adhere to nutrition recommendations by end of shift  Outcome: Progressing  Goal: Vital signs within normal range for age by end of shift  Outcome: Progressing  Goal: Increase self care and/or family involovement by end of shift  Outcome: Progressing  Goal: Receive DSME education by end of shift  Outcome: Progressing     Problem: Pain - Adult  Goal: Verbalizes/displays adequate comfort level or baseline comfort level  Outcome: Progressing     Problem: Safety - Adult  Goal: Free from fall injury  Outcome: Progressing     Problem: Discharge Planning  Goal: Discharge to home or other facility with appropriate resources  Outcome: Progressing     Problem: Chronic Conditions and Co-morbidities  Goal: Patient's chronic conditions and co-morbidity symptoms are monitored and maintained or improved  Outcome: Progressing

## 2024-01-09 NOTE — PROGRESS NOTES
"Carlyle Houser is a 53 y.o. male on day 1 of admission presenting with Hypoglycemia.    Subjective   ***       Objective     Physical Exam    Last Recorded Vitals  Blood pressure 111/56, pulse 71, temperature 36.9 °C (98.4 °F), temperature source Temporal, resp. rate 16, height 1.676 m (5' 6\"), weight 89.2 kg (196 lb 9.6 oz), SpO2 98 %.  Intake/Output last 3 Shifts:  No intake/output data recorded.    Relevant Results  {If you would like to pull in Medications, type .meds     If you would like to pull in Lab results for the last 24 hours, type .[id number]    If you would like to pull in Imaging results, type .imgrslt :99}    {Link to Stroke Scoring tools - Link :99}                       Assessment/Plan   Principal Problem:    Hypoglycemia    ***     {This patient does not have an ACP note on file for this encounter, please fill one out - Advance Care Planning Activity :99}      Brando Bains LPN      "

## 2024-01-10 LAB
ATRIAL RATE: 96 BPM
P AXIS: 64 DEGREES
P OFFSET: 205 MS
P ONSET: 157 MS
PR INTERVAL: 136 MS
Q ONSET: 225 MS
QRS COUNT: 16 BEATS
QRS DURATION: 74 MS
QT INTERVAL: 368 MS
QTC CALCULATION(BAZETT): 464 MS
QTC FREDERICIA: 430 MS
R AXIS: 43 DEGREES
T AXIS: 58 DEGREES
T OFFSET: 409 MS
VENTRICULAR RATE: 96 BPM

## 2024-01-11 ENCOUNTER — PATIENT OUTREACH (OUTPATIENT)
Dept: HOME HEALTH SERVICES | Age: 54
End: 2024-01-11
Payer: MEDICARE

## 2024-01-11 VITALS — WEIGHT: 200 LBS | BODY MASS INDEX: 31.39 KG/M2 | HEIGHT: 67 IN

## 2024-01-11 SDOH — ECONOMIC STABILITY: HOUSING INSECURITY
IN THE LAST 12 MONTHS, WAS THERE A TIME WHEN YOU DID NOT HAVE A STEADY PLACE TO SLEEP OR SLEPT IN A SHELTER (INCLUDING NOW)?: NO

## 2024-01-11 SDOH — HEALTH STABILITY: MENTAL HEALTH
STRESS IS WHEN SOMEONE FEELS TENSE, NERVOUS, ANXIOUS, OR CAN'T SLEEP AT NIGHT BECAUSE THEIR MIND IS TROUBLED. HOW STRESSED ARE YOU?: NOT AT ALL

## 2024-01-11 SDOH — ECONOMIC STABILITY: FOOD INSECURITY: WITHIN THE PAST 12 MONTHS, YOU WORRIED THAT YOUR FOOD WOULD RUN OUT BEFORE YOU GOT MONEY TO BUY MORE.: NEVER TRUE

## 2024-01-11 SDOH — SOCIAL STABILITY: SOCIAL INSECURITY
WITHIN THE LAST YEAR, HAVE YOU BEEN KICKED, HIT, SLAPPED, OR OTHERWISE PHYSICALLY HURT BY YOUR PARTNER OR EX-PARTNER?: NO

## 2024-01-11 SDOH — HEALTH STABILITY: PHYSICAL HEALTH: ON AVERAGE, HOW MANY MINUTES DO YOU ENGAGE IN EXERCISE AT THIS LEVEL?: 0 MIN

## 2024-01-11 SDOH — ECONOMIC STABILITY: INCOME INSECURITY: IN THE PAST 12 MONTHS, HAS THE ELECTRIC, GAS, OIL, OR WATER COMPANY THREATENED TO SHUT OFF SERVICE IN YOUR HOME?: YES

## 2024-01-11 SDOH — ECONOMIC STABILITY: FOOD INSECURITY: WITHIN THE PAST 12 MONTHS, THE FOOD YOU BOUGHT JUST DIDN'T LAST AND YOU DIDN'T HAVE MONEY TO GET MORE.: NEVER TRUE

## 2024-01-11 SDOH — SOCIAL STABILITY: SOCIAL NETWORK: HOW OFTEN DO YOU ATTENT MEETINGS OF THE CLUB OR ORGANIZATION YOU BELONG TO?: NEVER

## 2024-01-11 SDOH — ECONOMIC STABILITY: INCOME INSECURITY: IN THE LAST 12 MONTHS, WAS THERE A TIME WHEN YOU WERE NOT ABLE TO PAY THE MORTGAGE OR RENT ON TIME?: NO

## 2024-01-11 SDOH — ECONOMIC STABILITY: GENERAL: WOULD YOU LIKE HELP WITH ANY OF THE FOLLOWING NEEDS?: UTILITIES

## 2024-01-11 SDOH — SOCIAL STABILITY: SOCIAL INSECURITY: WITHIN THE LAST YEAR, HAVE YOU BEEN AFRAID OF YOUR PARTNER OR EX-PARTNER?: NO

## 2024-01-11 SDOH — SOCIAL STABILITY: SOCIAL INSECURITY: WITHIN THE LAST YEAR, HAVE YOU BEEN HUMILIATED OR EMOTIONALLY ABUSED IN OTHER WAYS BY YOUR PARTNER OR EX-PARTNER?: NO

## 2024-01-11 SDOH — HEALTH STABILITY: MENTAL HEALTH: HOW OFTEN DO YOU HAVE A DRINK CONTAINING ALCOHOL?: NEVER

## 2024-01-11 SDOH — SOCIAL STABILITY: SOCIAL NETWORK
DO YOU BELONG TO ANY CLUBS OR ORGANIZATIONS SUCH AS CHURCH GROUPS UNIONS, FRATERNAL OR ATHLETIC GROUPS, OR SCHOOL GROUPS?: NO

## 2024-01-11 SDOH — SOCIAL STABILITY: SOCIAL INSECURITY
WITHIN THE LAST YEAR, HAVE TO BEEN RAPED OR FORCED TO HAVE ANY KIND OF SEXUAL ACTIVITY BY YOUR PARTNER OR EX-PARTNER?: NO

## 2024-01-11 SDOH — SOCIAL STABILITY: SOCIAL NETWORK: IN A TYPICAL WEEK, HOW MANY TIMES DO YOU TALK ON THE PHONE WITH FAMILY, FRIENDS, OR NEIGHBORS?: NEVER

## 2024-01-11 SDOH — ECONOMIC STABILITY: INCOME INSECURITY: HOW HARD IS IT FOR YOU TO PAY FOR THE VERY BASICS LIKE FOOD, HOUSING, MEDICAL CARE, AND HEATING?: SOMEWHAT HARD

## 2024-01-11 SDOH — HEALTH STABILITY: PHYSICAL HEALTH: ON AVERAGE, HOW MANY DAYS PER WEEK DO YOU ENGAGE IN MODERATE TO STRENUOUS EXERCISE (LIKE A BRISK WALK)?: 0 DAYS

## 2024-01-11 SDOH — SOCIAL STABILITY: SOCIAL NETWORK: HOW OFTEN DO YOU GET TOGETHER WITH FRIENDS OR RELATIVES?: ONCE A WEEK

## 2024-01-11 SDOH — ECONOMIC STABILITY: TRANSPORTATION INSECURITY
IN THE PAST 12 MONTHS, HAS THE LACK OF TRANSPORTATION KEPT YOU FROM MEDICAL APPOINTMENTS OR FROM GETTING MEDICATIONS?: NO

## 2024-01-11 SDOH — ECONOMIC STABILITY: TRANSPORTATION INSECURITY
IN THE PAST 12 MONTHS, HAS LACK OF TRANSPORTATION KEPT YOU FROM MEETINGS, WORK, OR FROM GETTING THINGS NEEDED FOR DAILY LIVING?: NO

## 2024-01-11 SDOH — SOCIAL STABILITY: SOCIAL NETWORK: HOW OFTEN DO YOU ATTEND CHURCH OR RELIGIOUS SERVICES?: NEVER

## 2024-01-11 SDOH — ECONOMIC STABILITY: FOOD INSECURITY
ARE ANY OF YOUR NEEDS URGENT? FOR EXAMPLE, UNCERTAINTY OF WHERE YOU WILL GET YOUR NEXT MEAL OR NOT HAVING THE MEDICATIONS YOU NEED TO TAKE TOMORROW.: NO

## 2024-01-11 ASSESSMENT — ACTIVITIES OF DAILY LIVING (ADL): BATHING: YES

## 2024-01-11 ASSESSMENT — ENCOUNTER SYMPTOMS
DEPRESSION: 0
OCCASIONAL FEELINGS OF UNSTEADINESS: 0
LOSS OF SENSATION IN FEET: 0

## 2024-01-11 NOTE — PROGRESS NOTES
Daily Call Note:   Enrolled in Healthy at Home Program  Pt Education:   Barriers:   Topics for Daily Review:   Pt demonstrates clear understanding:     Daily Weight:  There were no vitals filed for this visit.   Last 3 Weights:  Wt Readings from Last 7 Encounters:   01/08/24 89.2 kg (196 lb 9.6 oz)       Masimo Device:    Masimo Clinical Impression:     Virtual Visits--Scheduled (Most Recent Date at Top)  Follow up Appointments  Recent Visits  No visits were found meeting these conditions.  Showing recent visits within past 30 days and meeting all other requirements  Future Appointments  No visits were found meeting these conditions.  Showing future appointments within next 90 days and meeting all other requirements       Frequency of RN Calls & Virtual Visits per Team Agreement:     Medication issues Addressed (what was done):     Follow up appointments scheduled by [facility] Staff:   Referrals made by [facility] staff:       [facility] Care Transitions Assessment    Patient's Address:   [address] [place] [id number]  **  If this is not the address patient will receive services - alert team and address in EMR**       Patient Contacts:  Extended Emergency Contact Information  Primary Emergency Contact: Kimberlyn Houser  Home Phone: [phone]  Work Phone: [phone]  Relation: Spouse                                Patient's Preferred Phone: [phone]  Patient's E-mail: No e-mail address on record

## 2024-01-12 LAB
BACTERIA BLD CULT: NORMAL
BACTERIA BLD CULT: NORMAL

## 2024-01-15 ENCOUNTER — PATIENT OUTREACH (OUTPATIENT)
Dept: HOME HEALTH SERVICES | Age: 54
End: 2024-01-15
Payer: MEDICARE

## 2024-01-15 NOTE — PROGRESS NOTES
Daily call completed patient is doing ok states his glucose levels have been over 200 encouraged him to make sure he is keeping a log of his glucose so we can make sure his medications are adjusted ptoperly patient missed his intial [facility] appointment rescheduled for 1/16/24 @ 1630 no medication needs at this time questions and concerns addressed     Patient's Address:   [address] [place] [address] [address]  [facility] [address]  **  If this is not the address patient will receive services - alert team and address in EMR**       Patient Contacts:  Extended Emergency Contact Information  Primary Emergency Contact: Kimberlyn Houser  Home Phone: [phone]  Work Phone: [phone]  Relation: Spouse                                Patient's Preferred Phone: [phone]  Patient's E-mail: No e-mail address on record

## 2024-01-16 ENCOUNTER — APPOINTMENT (OUTPATIENT)
Dept: CARE COORDINATION | Age: 54
End: 2024-01-16
Payer: MEDICARE

## 2024-01-16 ENCOUNTER — PATIENT OUTREACH (OUTPATIENT)
Dept: HOME HEALTH SERVICES | Age: 54
End: 2024-01-16

## 2024-01-16 LAB
CHLORPROPAMIDE SERPL-MCNC: NORMAL MCG/ML
GLIMEPIRIDE SERPL-MCNC: NORMAL NG/ML
GLIPIZIDE SERPL-MCNC: NORMAL NG/ML
GLYBURIDE SERPL-MCNC: NORMAL NG/ML
NATEGLINIDE SERPL-MCNC: NORMAL MCG/ML
PIOGLITAZONE: NORMAL NG/ML
REPAGLINIDE SERPL-MCNC: NORMAL NG/ML
ROSIGLITAZONE: NORMAL NG/ML
TOLAZAMIDE SERPL-MCNC: NORMAL MCG/ML
TOLBUTAMIDE SERPL-MCNC: NORMAL MCG/ML

## 2024-01-16 NOTE — PROGRESS NOTES
Daily Call Note:   Patient missed second Initial [facility] Virtual Visit.    Pt Education:   Barriers:   Topics for Daily Review:   Pt demonstrates clear understanding:     Daily Weight:  There were no vitals filed for this visit.   Last 3 Weights:  Wt Readings from Last 7 Encounters:   01/11/24 90.7 kg (200 lb)   01/08/24 89.2 kg (196 lb 9.6 oz)       Masimo Device:    Masimo Clinical Impression:     Virtual Visits--Scheduled (Most Recent Date at Top)  Follow up Appointments  Recent Visits  No visits were found meeting these conditions.  Showing recent visits within past 30 days and meeting all other requirements  Future Appointments  No visits were found meeting these conditions.  Showing future appointments within next 90 days and meeting all other requirements       Frequency of RN Calls & Virtual Visits per Team Agreement:     Medication issues Addressed (what was done):     Follow up appointments scheduled by [facility] Staff:   Referrals made by [facility] staff:       [facility] Trans[facility] Assessment    Patient's Address:   [address] [place] [id number]  **  If this is not the address patient will receive services - alert team and address in EMR**       Patient Contacts:  Extended Emergency Contact Information  Primary Emergency Contact: Kimberlyn Houser  Home Phone: [phone]  Work Phone: [phone]  Relation: Spouse                                Patient's Preferred Phone: [phone]  Patient's E-mail: No e-mail address on record

## 2024-01-17 ENCOUNTER — PATIENT OUTREACH (OUTPATIENT)
Dept: HOME HEALTH SERVICES | Age: 54
End: 2024-01-17
Payer: MEDICARE

## 2024-01-17 NOTE — PROGRESS NOTES
Daily Call Note: Daily call complete. Patient states blood sugar today 157. He states he has appointment today with diabetic doctor. Denies any pain or SOB. Initial [facility] scheduled for 1/18/24 at 1430, verbalized understanding. Patient missed [facility] call yesterday and states that he called back to reschedule and let us know that he got our voicemail, but states that he couldn't get trough, it just rang with no answer. I provided [facility] contact number, asked him to attempt to call in to make sure he could get through, call was successful. No other questions or concerns at this time.     Pt Education:   Barriers: none  Topics for Daily Review: blood sugar  Pt demonstrates clear understanding: Yes    Daily Weight:  There were no vitals filed for this visit.   Last 3 Weights:  Wt Readings from Last 7 Encounters:   01/11/24 90.7 kg (200 lb)   01/08/24 89.2 kg (196 lb 9.6 oz)       Masimo Device: No   Masimo Clinical Impression: N/A    Virtual Visits--Scheduled (Most Recent Date at Top)  Follow up Appointments  Recent Visits  No visits were found meeting these conditions.  Showing recent visits within past 30 days and meeting all other requirements  Future Appointments  No visits were found meeting these conditions.  Showing future appointments within next 90 days and meeting all other requirements       Frequency of RN Calls & Virtual Visits per Team Agreement: [facility] Frequency: Daily    Medication issues Addressed (what was done): none    Follow up appointments scheduled by [facility] Staff: [facility] 1/18/24 at 1430    Referrals made by [facility] staff: none      [facility] Transitions Assessment    Patient's Address:   [address] [place] [id number]  **  If this is not the address patient will receive services - alert team and address in EMR**       Patient Contacts:  Extended Emergency Contact Information  Primary Emergency Contact: Kimberlyn Houser  Home Phone: [phone]  Work Phone:  [phone]  Relation: Spouse                                Patient's Preferred Phone: [phone]  Patient's E-mail: No e-mail address on record

## 2024-02-28 ENCOUNTER — HOSPITAL ENCOUNTER (EMERGENCY)
Facility: HOSPITAL | Age: 54
Discharge: HOME | End: 2024-02-29
Attending: STUDENT IN AN ORGANIZED HEALTH CARE EDUCATION/TRAINING PROGRAM
Payer: MEDICARE

## 2024-02-28 DIAGNOSIS — E16.2 HYPOGLYCEMIA: Primary | ICD-10-CM

## 2024-02-28 LAB
ALBUMIN SERPL BCP-MCNC: 4.2 G/DL (ref 3.4–5)
ALP SERPL-CCNC: 112 U/L (ref 33–120)
ALT SERPL W P-5'-P-CCNC: 23 U/L (ref 10–52)
ANION GAP SERPL CALC-SCNC: 10 MMOL/L (ref 10–20)
AST SERPL W P-5'-P-CCNC: 18 U/L (ref 9–39)
B-OH-BUTYR SERPL-SCNC: 0.1 MMOL/L (ref 0.02–0.27)
BASOPHILS # BLD AUTO: 0.03 X10*3/UL (ref 0–0.1)
BASOPHILS NFR BLD AUTO: 0.3 %
BILIRUB SERPL-MCNC: 0.3 MG/DL (ref 0–1.2)
BUN SERPL-MCNC: 23 MG/DL (ref 6–23)
CALCIUM SERPL-MCNC: 9.2 MG/DL (ref 8.6–10.3)
CHLORIDE SERPL-SCNC: 109 MMOL/L (ref 98–107)
CO2 SERPL-SCNC: 25 MMOL/L (ref 21–32)
CREAT SERPL-MCNC: 1.17 MG/DL (ref 0.5–1.3)
EGFRCR SERPLBLD CKD-EPI 2021: 74 ML/MIN/1.73M*2
EOSINOPHIL # BLD AUTO: 0.18 X10*3/UL (ref 0–0.7)
EOSINOPHIL NFR BLD AUTO: 1.6 %
ERYTHROCYTE [DISTWIDTH] IN BLOOD BY AUTOMATED COUNT: 12.9 % (ref 11.5–14.5)
GLUCOSE BLD MANUAL STRIP-MCNC: 56 MG/DL (ref 74–99)
GLUCOSE BLD MANUAL STRIP-MCNC: 79 MG/DL (ref 74–99)
GLUCOSE SERPL-MCNC: 30 MG/DL (ref 74–99)
HCT VFR BLD AUTO: 37.1 % (ref 41–52)
HGB BLD-MCNC: 12.8 G/DL (ref 13.5–17.5)
IMM GRANULOCYTES # BLD AUTO: 0.06 X10*3/UL (ref 0–0.7)
IMM GRANULOCYTES NFR BLD AUTO: 0.5 % (ref 0–0.9)
LYMPHOCYTES # BLD AUTO: 1.48 X10*3/UL (ref 1.2–4.8)
LYMPHOCYTES NFR BLD AUTO: 12.8 %
MAGNESIUM SERPL-MCNC: 1.92 MG/DL (ref 1.6–2.4)
MCH RBC QN AUTO: 30.5 PG (ref 26–34)
MCHC RBC AUTO-ENTMCNC: 34.5 G/DL (ref 32–36)
MCV RBC AUTO: 88 FL (ref 80–100)
MONOCYTES # BLD AUTO: 0.79 X10*3/UL (ref 0.1–1)
MONOCYTES NFR BLD AUTO: 6.8 %
NEUTROPHILS # BLD AUTO: 9.04 X10*3/UL (ref 1.2–7.7)
NEUTROPHILS NFR BLD AUTO: 78 %
NRBC BLD-RTO: 0 /100 WBCS (ref 0–0)
PHOSPHATE SERPL-MCNC: 2.6 MG/DL (ref 2.5–4.9)
PLATELET # BLD AUTO: 217 X10*3/UL (ref 150–450)
POTASSIUM SERPL-SCNC: 3.3 MMOL/L (ref 3.5–5.3)
PROT SERPL-MCNC: 7.1 G/DL (ref 6.4–8.2)
RBC # BLD AUTO: 4.2 X10*6/UL (ref 4.5–5.9)
SODIUM SERPL-SCNC: 141 MMOL/L (ref 136–145)
WBC # BLD AUTO: 11.6 X10*3/UL (ref 4.4–11.3)

## 2024-02-28 PROCEDURE — 85025 COMPLETE CBC W/AUTO DIFF WBC: CPT | Performed by: STUDENT IN AN ORGANIZED HEALTH CARE EDUCATION/TRAINING PROGRAM

## 2024-02-28 PROCEDURE — 2500000004 HC RX 250 GENERAL PHARMACY W/ HCPCS (ALT 636 FOR OP/ED): Performed by: STUDENT IN AN ORGANIZED HEALTH CARE EDUCATION/TRAINING PROGRAM

## 2024-02-28 PROCEDURE — 96361 HYDRATE IV INFUSION ADD-ON: CPT

## 2024-02-28 PROCEDURE — 96360 HYDRATION IV INFUSION INIT: CPT

## 2024-02-28 PROCEDURE — 80053 COMPREHEN METABOLIC PANEL: CPT | Performed by: STUDENT IN AN ORGANIZED HEALTH CARE EDUCATION/TRAINING PROGRAM

## 2024-02-28 PROCEDURE — 82947 ASSAY GLUCOSE BLOOD QUANT: CPT

## 2024-02-28 PROCEDURE — 84100 ASSAY OF PHOSPHORUS: CPT | Performed by: STUDENT IN AN ORGANIZED HEALTH CARE EDUCATION/TRAINING PROGRAM

## 2024-02-28 PROCEDURE — 82947 ASSAY GLUCOSE BLOOD QUANT: CPT | Mod: 59

## 2024-02-28 PROCEDURE — 82010 KETONE BODYS QUAN: CPT | Performed by: STUDENT IN AN ORGANIZED HEALTH CARE EDUCATION/TRAINING PROGRAM

## 2024-02-28 PROCEDURE — 83735 ASSAY OF MAGNESIUM: CPT | Performed by: STUDENT IN AN ORGANIZED HEALTH CARE EDUCATION/TRAINING PROGRAM

## 2024-02-28 PROCEDURE — 36415 COLL VENOUS BLD VENIPUNCTURE: CPT | Performed by: STUDENT IN AN ORGANIZED HEALTH CARE EDUCATION/TRAINING PROGRAM

## 2024-02-28 PROCEDURE — 99283 EMERGENCY DEPT VISIT LOW MDM: CPT

## 2024-02-28 RX ORDER — DEXTROSE MONOHYDRATE 100 MG/ML
100 INJECTION, SOLUTION INTRAVENOUS CONTINUOUS
Status: ACTIVE | OUTPATIENT
Start: 2024-02-28 | End: 2024-02-29

## 2024-02-28 RX ADMIN — DEXTROSE MONOHYDRATE 100 ML/HR: 100 INJECTION, SOLUTION INTRAVENOUS at 22:45

## 2024-02-28 ASSESSMENT — COLUMBIA-SUICIDE SEVERITY RATING SCALE - C-SSRS
2. HAVE YOU ACTUALLY HAD ANY THOUGHTS OF KILLING YOURSELF?: NO
6. HAVE YOU EVER DONE ANYTHING, STARTED TO DO ANYTHING, OR PREPARED TO DO ANYTHING TO END YOUR LIFE?: NO
1. IN THE PAST MONTH, HAVE YOU WISHED YOU WERE DEAD OR WISHED YOU COULD GO TO SLEEP AND NOT WAKE UP?: NO

## 2024-02-28 ASSESSMENT — PAIN SCALES - GENERAL: PAINLEVEL_OUTOF10: 0 - NO PAIN

## 2024-02-28 ASSESSMENT — PAIN - FUNCTIONAL ASSESSMENT: PAIN_FUNCTIONAL_ASSESSMENT: 0-10

## 2024-02-29 VITALS
RESPIRATION RATE: 18 BRPM | DIASTOLIC BLOOD PRESSURE: 86 MMHG | OXYGEN SATURATION: 100 % | HEIGHT: 66 IN | HEART RATE: 92 BPM | SYSTOLIC BLOOD PRESSURE: 148 MMHG | BODY MASS INDEX: 32.14 KG/M2 | TEMPERATURE: 97 F | WEIGHT: 200 LBS

## 2024-02-29 LAB
GLUCOSE BLD MANUAL STRIP-MCNC: 255 MG/DL (ref 74–99)
GLUCOSE BLD MANUAL STRIP-MCNC: 84 MG/DL (ref 74–99)

## 2024-02-29 PROCEDURE — 82947 ASSAY GLUCOSE BLOOD QUANT: CPT

## 2024-02-29 ASSESSMENT — LIFESTYLE VARIABLES
HAVE YOU EVER FELT YOU SHOULD CUT DOWN ON YOUR DRINKING: NO
EVER FELT BAD OR GUILTY ABOUT YOUR DRINKING: NO
HAVE PEOPLE ANNOYED YOU BY CRITICIZING YOUR DRINKING: NO
EVER HAD A DRINK FIRST THING IN THE MORNING TO STEADY YOUR NERVES TO GET RID OF A HANGOVER: NO

## 2024-02-29 NOTE — ED PROVIDER NOTES
HPI   Chief Complaint   Patient presents with    Altered Mental Status     Ems  was called for pt by spouse because pts sugar was high. Spouse gave 10units of fast acting insulin. Sugar was checked again and read 50. EMS started D10, checked sugar, and read 564. Sugar rechecked on arrival. Now reads 56.       Patient is a 53-year-old male presenting to the emergency department for complaints of altered mental status/hypoglycemia.  Patient had previous admission for similar complaint 1 month ago.  Patient's medications were changed and he is currently only on lispro 3 times daily with meals.  Wife states that they had a normal day yesterday without any complaints and he was completely normal.  Today she states that he was acting weird and shivering/shaking and when she checked his glucose it was initially low in the 40s.  She then rechecked it and found that his sugar was elevated in the 500s.  Wife states that she gave him 10 units of lispro and called EMS.  Paramedics state that when they arrived the patient's blood sugar was low and started a D10 drip and after approximately 50 cc of the D10 drip they rechecked his blood sugar and got readings in the 500s so they discontinued the D10 drip.  On arrival to the ED the patient is responsive but confused and has a point-of-care glucose of 56.  History obtained from wife and chart review.      History provided by:  Medical records and spouse  History limited by:  Mental status change   used: No                        Cheikh Coma Scale Score: 14                     Patient History   Past Medical History:   Diagnosis Date    Anxiety     Asthma     Diabetes mellitus (CMS/[facility])     Hypertension      History reviewed. No pertinent surgical history.  No family history on file.  Social History     Tobacco Use    Smoking status: Never    Smokeless tobacco: Never   Vaping Use    Vaping Use: Never used   Substance Use Topics    Alcohol use: Never    Drug use:  Never       Physical Exam   ED Triage Vitals [02/28/24 2223]   Temperature Heart Rate Respirations BP   36.1 °C (97 °F) (!) 105 18 170/74      Pulse Ox Temp Source Heart Rate Source Patient Position   100 % Temporal Monitor Sitting      BP Location FiO2 (%)     Left arm --       Physical Exam  Vitals and nursing note reviewed.   Constitutional:       General: He is not in acute distress.     Appearance: Normal appearance. He is not ill-appearing, toxic-appearing or diaphoretic.   HENT:      Head: Normocephalic and atraumatic.      Nose: Nose normal.      Mouth/Throat:      Mouth: Mucous membranes are moist.      Pharynx: No oropharyngeal exudate or posterior oropharyngeal erythema.   Eyes:      General: No scleral icterus.     Extraocular Movements: Extraocular movements intact.      Pupils: Pupils are equal, round, and reactive to light.   Cardiovascular:      Rate and Rhythm: Regular rhythm. Tachycardia present.      Pulses: Normal pulses.      Heart sounds: Normal heart sounds. No murmur heard.     No friction rub. No gallop.   Pulmonary:      Effort: Pulmonary effort is normal. No respiratory distress.      Breath sounds: Normal breath sounds. No stridor. No wheezing, rhonchi or rales.   Chest:      Chest wall: No tenderness.   Abdominal:      General: Abdomen is flat. There is no distension.      Palpations: Abdomen is soft. There is no mass.      Tenderness: There is no abdominal tenderness. There is no guarding.      Hernia: No hernia is present.   Musculoskeletal:         General: No swelling, tenderness, deformity or signs of injury. Normal range of motion.      Cervical back: Normal range of motion and neck supple. No rigidity.   Skin:     General: Skin is warm and dry.      Capillary Refill: Capillary refill takes less than 2 seconds.      Coloration: Skin is not jaundiced or pale.      Findings: No bruising, erythema, lesion or rash.   Neurological:      General: No focal deficit present.      Mental  Status: He is alert and oriented to person, place, and time. Mental status is at baseline.   Psychiatric:         Mood and Affect: Mood normal.         Behavior: Behavior normal.         ED Course & MDM   Diagnoses as of 02/29/24 0154   Hypoglycemia       Medical Decision Making  Patient is a 53-year-old male presenting to the emergency department with his wife for complaints of hypoglycemia and altered mental status.  Patient has a hypoglycemic point-of-care glucose here in D10 infusion was ordered along with labs.  Wife states that he was in his normal state of health yesterday and denies any infectious type symptoms.  Patient is responsive but confused on initial exam.  No reported trauma.  Uncertain exactly why the wife as well as EMS got conflicting glucose levels however the patient's presentation is currently consistent with hypoglycemic episode and with the patient receiving 10 units of fast acting insulin before arrival I have ordered every hour glucose checks along with the D10 drip and labs to continuously monitor the patient's glucose levels.    After receiving D10 and eating patient's mentation and blood sugar improved.  Patient was observed for an hour off of the D10 drip with blood sugar 255.  Patient was answering all questions appropriately and denying any pain or complaints.  Patient is back to his baseline mental status per family in the room.  They were advised to go home and continue with her normal insulin regimen at breakfast and to follow-up with their doctors.  Return precautions were given.  All questions were answered.  Patient and family at bedside were appreciative care and agreeable to discharge.    Amount and/or Complexity of Data Reviewed  Labs: ordered. Decision-making details documented in ED Course.      Labs Reviewed   CBC WITH AUTO DIFFERENTIAL - Abnormal       Result Value    WBC 11.6 (*)     nRBC 0.0      RBC 4.20 (*)     Hemoglobin 12.8 (*)     Hematocrit 37.1 (*)     MCV 88       MCH 30.5      MCHC 34.5      RDW 12.9      Platelets 217      Neutrophils % 78.0      Immature Granulocytes %, Automated 0.5      Lymphocytes % 12.8      Monocytes % 6.8      Eosinophils % 1.6      Basophils % 0.3      Neutrophils Absolute 9.04 (*)     Immature Granulocytes Absolute, Automated 0.06      Lymphocytes Absolute 1.48      Monocytes Absolute 0.79      Eosinophils Absolute 0.18      Basophils Absolute 0.03     COMPREHENSIVE METABOLIC PANEL - Abnormal    Glucose 30 (*)     Sodium 141      Potassium 3.3 (*)     Chloride 109 (*)     Bicarbonate 25      Anion Gap 10      Urea Nitrogen 23      Creatinine 1.17      eGFR 74      Calcium 9.2      Albumin 4.2      Alkaline Phosphatase 112      Total Protein 7.1      AST 18      Bilirubin, Total 0.3      ALT 23     POCT GLUCOSE - Abnormal    POCT Glucose 56 (*)    POCT GLUCOSE - Abnormal    POCT Glucose 255 (*)    PHOSPHORUS - Normal    Phosphorus 2.6     MAGNESIUM - Normal    Magnesium 1.92     BETA HYDROXYBUTYRATE - Normal    Beta-Hydroxybutyrate 0.10      Narrative:     The beta-hydroxybutyrate test performance characteristics have been validated by  [facility]. This test has not been approved by the FDA; however,such approval is not necessary.     POCT GLUCOSE - Normal    POCT Glucose 79     POCT GLUCOSE - Normal    POCT Glucose 84     URINALYSIS WITH REFLEX CULTURE AND MICROSCOPIC    Narrative:     The following orders were created for panel order Urinalysis with Reflex Culture and Microscopic.  Procedure                               Abnormality         Status                     ---------                               -----------         ------                     Urinalysis with Reflex C...[[id number]]                                                 Extra Urine Gray Tube[[id number]]                                                         Please view results for these tests on the individual orders.   URINALYSIS  WITH REFLEX CULTURE AND MICROSCOPIC   EXTRA URINE GRAY TUBE   POCT GLUCOSE METER   POCT GLUCOSE METER   POCT GLUCOSE METER   POCT GLUCOSE METER   POCT GLUCOSE METER     No orders to display         Procedure  Procedures     Janak Pollock,   02/29/24 0154

## 2024-02-29 NOTE — DISCHARGE INSTRUCTIONS
Please follow up with your Primary Care Provider (PCP) within the next 2-3 days regarding your ED visit.  Please call your doctor or return here to the emergency department with any changes in mentation, chest pain, difficulty breathing, passing out, blood sugar concerns, or other signs and symptoms that you find concerning.  These documents have a lot of useful information! Please read them, so you know what to expect, what you can do for yourself at home, and also to know concerning signs warrant a return to the Emergency Department for additional evaluation.  You are welcome back any time. Thank you for entrusting your care to us, I hope we made your visit as pleasant as possible. Wishing you well!    Dr. Pollock

## 2024-03-02 ENCOUNTER — APPOINTMENT (OUTPATIENT)
Dept: RADIOLOGY | Facility: HOSPITAL | Age: 54
End: 2024-03-02
Payer: MEDICARE

## 2024-03-02 ENCOUNTER — HOSPITAL ENCOUNTER (EMERGENCY)
Facility: HOSPITAL | Age: 54
Discharge: HOME | End: 2024-03-02
Attending: STUDENT IN AN ORGANIZED HEALTH CARE EDUCATION/TRAINING PROGRAM
Payer: MEDICARE

## 2024-03-02 VITALS
OXYGEN SATURATION: 100 % | SYSTOLIC BLOOD PRESSURE: 162 MMHG | BODY MASS INDEX: 31.34 KG/M2 | WEIGHT: 195 LBS | RESPIRATION RATE: 16 BRPM | DIASTOLIC BLOOD PRESSURE: 105 MMHG | HEIGHT: 66 IN | TEMPERATURE: 98.6 F | HEART RATE: 92 BPM

## 2024-03-02 DIAGNOSIS — E16.2 HYPOGLYCEMIA: Primary | ICD-10-CM

## 2024-03-02 LAB
ANION GAP SERPL CALC-SCNC: 13 MMOL/L (ref 10–20)
BASOPHILS # BLD AUTO: 0.02 X10*3/UL (ref 0–0.1)
BASOPHILS NFR BLD AUTO: 0.2 %
BUN SERPL-MCNC: 20 MG/DL (ref 6–23)
CALCIUM SERPL-MCNC: 8.8 MG/DL (ref 8.6–10.3)
CHLORIDE SERPL-SCNC: 104 MMOL/L (ref 98–107)
CO2 SERPL-SCNC: 24 MMOL/L (ref 21–32)
CREAT SERPL-MCNC: 1.11 MG/DL (ref 0.5–1.3)
EGFRCR SERPLBLD CKD-EPI 2021: 79 ML/MIN/1.73M*2
EOSINOPHIL # BLD AUTO: 0.1 X10*3/UL (ref 0–0.7)
EOSINOPHIL NFR BLD AUTO: 1.1 %
ERYTHROCYTE [DISTWIDTH] IN BLOOD BY AUTOMATED COUNT: 12.8 % (ref 11.5–14.5)
GLUCOSE BLD MANUAL STRIP-MCNC: 109 MG/DL (ref 74–99)
GLUCOSE BLD MANUAL STRIP-MCNC: 160 MG/DL (ref 74–99)
GLUCOSE BLD MANUAL STRIP-MCNC: 169 MG/DL (ref 74–99)
GLUCOSE BLD MANUAL STRIP-MCNC: 172 MG/DL (ref 74–99)
GLUCOSE BLD MANUAL STRIP-MCNC: 188 MG/DL (ref 74–99)
GLUCOSE BLD MANUAL STRIP-MCNC: 67 MG/DL (ref 74–99)
GLUCOSE SERPL-MCNC: 165 MG/DL (ref 74–99)
HCT VFR BLD AUTO: 35.9 % (ref 41–52)
HGB BLD-MCNC: 12.2 G/DL (ref 13.5–17.5)
IMM GRANULOCYTES # BLD AUTO: 0.04 X10*3/UL (ref 0–0.7)
IMM GRANULOCYTES NFR BLD AUTO: 0.4 % (ref 0–0.9)
LYMPHOCYTES # BLD AUTO: 1.02 X10*3/UL (ref 1.2–4.8)
LYMPHOCYTES NFR BLD AUTO: 10.9 %
MAGNESIUM SERPL-MCNC: 1.65 MG/DL (ref 1.6–2.4)
MCH RBC QN AUTO: 30.2 PG (ref 26–34)
MCHC RBC AUTO-ENTMCNC: 34 G/DL (ref 32–36)
MCV RBC AUTO: 89 FL (ref 80–100)
MONOCYTES # BLD AUTO: 0.61 X10*3/UL (ref 0.1–1)
MONOCYTES NFR BLD AUTO: 6.5 %
NEUTROPHILS # BLD AUTO: 7.54 X10*3/UL (ref 1.2–7.7)
NEUTROPHILS NFR BLD AUTO: 80.9 %
NRBC BLD-RTO: 0 /100 WBCS (ref 0–0)
PLATELET # BLD AUTO: 246 X10*3/UL (ref 150–450)
POTASSIUM SERPL-SCNC: 4.2 MMOL/L (ref 3.5–5.3)
RBC # BLD AUTO: 4.04 X10*6/UL (ref 4.5–5.9)
SODIUM SERPL-SCNC: 137 MMOL/L (ref 136–145)
WBC # BLD AUTO: 9.3 X10*3/UL (ref 4.4–11.3)

## 2024-03-02 PROCEDURE — 96360 HYDRATION IV INFUSION INIT: CPT | Mod: 59

## 2024-03-02 PROCEDURE — 82947 ASSAY GLUCOSE BLOOD QUANT: CPT | Mod: 59

## 2024-03-02 PROCEDURE — 74177 CT ABD & PELVIS W/CONTRAST: CPT

## 2024-03-02 PROCEDURE — 83735 ASSAY OF MAGNESIUM: CPT | Performed by: STUDENT IN AN ORGANIZED HEALTH CARE EDUCATION/TRAINING PROGRAM

## 2024-03-02 PROCEDURE — 36415 COLL VENOUS BLD VENIPUNCTURE: CPT | Performed by: STUDENT IN AN ORGANIZED HEALTH CARE EDUCATION/TRAINING PROGRAM

## 2024-03-02 PROCEDURE — 99284 EMERGENCY DEPT VISIT MOD MDM: CPT | Mod: 25

## 2024-03-02 PROCEDURE — 84206 ASSAY OF PROINSULIN: CPT | Performed by: STUDENT IN AN ORGANIZED HEALTH CARE EDUCATION/TRAINING PROGRAM

## 2024-03-02 PROCEDURE — 80048 BASIC METABOLIC PNL TOTAL CA: CPT | Performed by: STUDENT IN AN ORGANIZED HEALTH CARE EDUCATION/TRAINING PROGRAM

## 2024-03-02 PROCEDURE — 2550000001 HC RX 255 CONTRASTS: Performed by: STUDENT IN AN ORGANIZED HEALTH CARE EDUCATION/TRAINING PROGRAM

## 2024-03-02 PROCEDURE — 84681 ASSAY OF C-PEPTIDE: CPT | Mod: ELYLAB | Performed by: STUDENT IN AN ORGANIZED HEALTH CARE EDUCATION/TRAINING PROGRAM

## 2024-03-02 PROCEDURE — 2500000004 HC RX 250 GENERAL PHARMACY W/ HCPCS (ALT 636 FOR OP/ED): Performed by: STUDENT IN AN ORGANIZED HEALTH CARE EDUCATION/TRAINING PROGRAM

## 2024-03-02 PROCEDURE — 85025 COMPLETE CBC W/AUTO DIFF WBC: CPT | Performed by: STUDENT IN AN ORGANIZED HEALTH CARE EDUCATION/TRAINING PROGRAM

## 2024-03-02 PROCEDURE — 74177 CT ABD & PELVIS W/CONTRAST: CPT | Mod: FOREIGN READ | Performed by: RADIOLOGY

## 2024-03-02 RX ORDER — DEXTROSE 50 % IN WATER (D50W) INTRAVENOUS SYRINGE
25
Status: DISCONTINUED | OUTPATIENT
Start: 2024-03-02 | End: 2024-03-03 | Stop reason: HOSPADM

## 2024-03-02 RX ORDER — DEXTROSE MONOHYDRATE 100 MG/ML
250 INJECTION, SOLUTION INTRAVENOUS ONCE AS NEEDED
Status: COMPLETED | OUTPATIENT
Start: 2024-03-02 | End: 2024-03-02

## 2024-03-02 RX ADMIN — DEXTROSE MONOHYDRATE 250 ML/HR: 100 INJECTION, SOLUTION INTRAVENOUS at 20:45

## 2024-03-02 RX ADMIN — IOHEXOL 75 ML: 350 INJECTION, SOLUTION INTRAVENOUS at 21:07

## 2024-03-02 ASSESSMENT — PAIN SCALES - GENERAL
PAINLEVEL_OUTOF10: 7

## 2024-03-02 ASSESSMENT — COLUMBIA-SUICIDE SEVERITY RATING SCALE - C-SSRS
6. HAVE YOU EVER DONE ANYTHING, STARTED TO DO ANYTHING, OR PREPARED TO DO ANYTHING TO END YOUR LIFE?: NO
2. HAVE YOU ACTUALLY HAD ANY THOUGHTS OF KILLING YOURSELF?: NO
1. IN THE PAST MONTH, HAVE YOU WISHED YOU WERE DEAD OR WISHED YOU COULD GO TO SLEEP AND NOT WAKE UP?: NO

## 2024-03-02 ASSESSMENT — LIFESTYLE VARIABLES
HAVE PEOPLE ANNOYED YOU BY CRITICIZING YOUR DRINKING: NO
HAVE YOU EVER FELT YOU SHOULD CUT DOWN ON YOUR DRINKING: NO
EVER HAD A DRINK FIRST THING IN THE MORNING TO STEADY YOUR NERVES TO GET RID OF A HANGOVER: NO
EVER FELT BAD OR GUILTY ABOUT YOUR DRINKING: NO

## 2024-03-02 ASSESSMENT — PAIN DESCRIPTION - FREQUENCY: FREQUENCY: INTERMITTENT

## 2024-03-02 ASSESSMENT — PAIN - FUNCTIONAL ASSESSMENT: PAIN_FUNCTIONAL_ASSESSMENT: 0-10

## 2024-03-02 ASSESSMENT — PAIN DESCRIPTION - LOCATION: LOCATION: OTHER (COMMENT)

## 2024-03-03 LAB
C PEPTIDE SERPL-MCNC: 1.1 NG/ML (ref 0.7–3.9)
HOLD SPECIMEN: NORMAL

## 2024-03-03 NOTE — ED PROVIDER NOTES
"HPI   Chief Complaint   Patient presents with    Hypoglycemia     BS 60 upon arrival for squad.  after D10 for squad.       Pt seen approximately 2 days ago for hypoglycemia. He was started on D10 in the ED and subsequently discharged after tolerating PO and holding his BG steady. Pt reports he has f/up with endocrine on Monday. Today, he started feeling jittery/diaphoretic and checked his BG reported to be 54. Was given full 250 ml d10 by EMS and rpt glucose trended up to 169. However, EMS reported \"even like 5-10 mins later he was falling back down to like 90s\"    Pt reports he was taken off all insulins before. His only medication is once weekly Trulicity injection that he received last Saturday.  He states he did not take it today.    Patient states \"I still feel little jittery but I do feel better than when they first got to the house \". Reports diarrhea today w/o blood. No fever/chills, cough/congestion, chest pain, sob or abdominal pain. Denies dysuria or hematuria.                           Cheikh Coma Scale Score: 15                     Patient History   Past Medical History:   Diagnosis Date    Anxiety     Asthma     Diabetes mellitus (CMS/HCC)     Hypertension      No past surgical history on file.  No family history on file.  Social History     Tobacco Use    Smoking status: Never    Smokeless tobacco: Never   Vaping Use    Vaping Use: Never used   Substance Use Topics    Alcohol use: Never    Drug use: Never       Physical Exam   ED Triage Vitals [03/02/24 2031]   Temperature Heart Rate Respirations BP   37 °C (98.6 °F) (!) 102 18 156/86      Pulse Ox Temp Source Heart Rate Source Patient Position   100 % Temporal Monitor Lying      BP Location FiO2 (%)     Left arm --       Physical Exam  Constitutional:       General: He is not in acute distress.     Appearance: He is normal weight. He is not toxic-appearing.   HENT:      Right Ear: External ear normal.      Left Ear: External ear normal.      " Mouth/Throat:      Mouth: Mucous membranes are dry.   Eyes:      Extraocular Movements: Extraocular movements intact.      Pupils: Pupils are equal, round, and reactive to light.   Cardiovascular:      Rate and Rhythm: Regular rhythm. Tachycardia present.   Pulmonary:      Effort: Pulmonary effort is normal.      Breath sounds: Normal breath sounds.   Abdominal:      Tenderness: There is no abdominal tenderness. There is no guarding or rebound.   Musculoskeletal:      Right lower leg: No edema.      Left lower leg: No edema.   Skin:     General: Skin is dry.      Capillary Refill: Capillary refill takes less than 2 seconds.   Neurological:      General: No focal deficit present.      Mental Status: He is alert and oriented to person, place, and time. Mental status is at baseline.         ED Course & MDM   Diagnoses as of 03/03/24 0026   Hypoglycemia       Medical Decision Making  Chart review from last ED encounter  Will expand workup for potential insulinoma today. Ordered c peptide and proinsulin levels in addition to CT A/P to evaluate for potential mass.  Pt's glucose down trending to 60s on arrival. Placed back on d10 @250 ml/hr and given glucagon 1 mg IM. Aox4 currently.     No mass noted on the CT of the abdomen pelvis.    After stopping the D10 for approximately 2 hours, patient maintain his blood glucose in the 150 range.  He was asymptomatic.  He stated that he felt comfortable being discharged.  Talk to the patient about following up with his endocrinologist on Monday and reassessing the need for Trulicity versus starting oral antihyperglycemic's as this medication may be too strong for him as this is a second course of hypoglycemia after taking the medication.  He felt comfortable with this plan.    Procedure  Procedures     Dago Shen MD  03/03/24 0027

## 2024-03-07 LAB — PROINSULIN P 12H FAST SERPL-SCNC: 2.7 PMOL/L

## 2024-03-22 ENCOUNTER — LAB (OUTPATIENT)
Dept: LAB | Facility: LAB | Age: 54
End: 2024-03-22
Payer: MEDICARE

## 2024-03-22 DIAGNOSIS — E10.65 TYPE 1 DIABETES MELLITUS WITH HYPERGLYCEMIA (MULTI): Primary | ICD-10-CM

## 2024-03-22 DIAGNOSIS — E78.5 HYPERLIPIDEMIA, UNSPECIFIED: ICD-10-CM

## 2024-03-22 LAB
ALBUMIN SERPL BCP-MCNC: 3.8 G/DL (ref 3.4–5)
ALP SERPL-CCNC: 96 U/L (ref 33–120)
ALT SERPL W P-5'-P-CCNC: 29 U/L (ref 10–52)
ANION GAP SERPL CALC-SCNC: 11 MMOL/L (ref 10–20)
AST SERPL W P-5'-P-CCNC: 19 U/L (ref 9–39)
BILIRUB SERPL-MCNC: 0.3 MG/DL (ref 0–1.2)
BUN SERPL-MCNC: 19 MG/DL (ref 6–23)
CALCIUM SERPL-MCNC: 8.8 MG/DL (ref 8.6–10.3)
CHLORIDE SERPL-SCNC: 110 MMOL/L (ref 98–107)
CHOLEST SERPL-MCNC: 84 MG/DL (ref 0–199)
CHOLESTEROL/HDL RATIO: 2.3
CO2 SERPL-SCNC: 25 MMOL/L (ref 21–32)
CREAT SERPL-MCNC: 1.14 MG/DL (ref 0.5–1.3)
CREAT UR-MCNC: 68.6 MG/DL (ref 20–370)
EGFRCR SERPLBLD CKD-EPI 2021: 76 ML/MIN/1.73M*2
EST. AVERAGE GLUCOSE BLD GHB EST-MCNC: 151 MG/DL
GLUCOSE SERPL-MCNC: 195 MG/DL (ref 74–99)
HBA1C MFR BLD: 6.9 %
HDLC SERPL-MCNC: 36.6 MG/DL
LDLC SERPL CALC-MCNC: 31 MG/DL
MICROALBUMIN UR-MCNC: 70.6 MG/L
MICROALBUMIN/CREAT UR: 102.9 UG/MG CREAT
NON HDL CHOLESTEROL: 47 MG/DL (ref 0–149)
POTASSIUM SERPL-SCNC: 4.6 MMOL/L (ref 3.5–5.3)
PROT SERPL-MCNC: 6.1 G/DL (ref 6.4–8.2)
SODIUM SERPL-SCNC: 141 MMOL/L (ref 136–145)
TRIGL SERPL-MCNC: 80 MG/DL (ref 0–149)
TSH SERPL-ACNC: 2.26 MIU/L (ref 0.44–3.98)
VLDL: 16 MG/DL (ref 0–40)

## 2024-03-22 PROCEDURE — 80053 COMPREHEN METABOLIC PANEL: CPT

## 2024-03-22 PROCEDURE — 80061 LIPID PANEL: CPT

## 2024-03-22 PROCEDURE — 83036 HEMOGLOBIN GLYCOSYLATED A1C: CPT

## 2024-03-22 PROCEDURE — 84443 ASSAY THYROID STIM HORMONE: CPT

## 2024-03-22 PROCEDURE — 36415 COLL VENOUS BLD VENIPUNCTURE: CPT

## 2024-03-22 PROCEDURE — 82043 UR ALBUMIN QUANTITATIVE: CPT

## 2024-03-22 PROCEDURE — 82570 ASSAY OF URINE CREATININE: CPT

## 2024-03-26 ENCOUNTER — HOSPITAL ENCOUNTER (EMERGENCY)
Facility: HOSPITAL | Age: 54
Discharge: HOME | End: 2024-03-26
Attending: EMERGENCY MEDICINE
Payer: MEDICARE

## 2024-03-26 VITALS
RESPIRATION RATE: 18 BRPM | WEIGHT: 219 LBS | SYSTOLIC BLOOD PRESSURE: 138 MMHG | HEIGHT: 66 IN | OXYGEN SATURATION: 100 % | HEART RATE: 85 BPM | DIASTOLIC BLOOD PRESSURE: 75 MMHG | BODY MASS INDEX: 35.2 KG/M2 | TEMPERATURE: 98.4 F

## 2024-03-26 DIAGNOSIS — R73.9 HYPERGLYCEMIA: Primary | ICD-10-CM

## 2024-03-26 LAB
ALBUMIN SERPL BCP-MCNC: 3.6 G/DL (ref 3.4–5)
ALP SERPL-CCNC: 99 U/L (ref 33–120)
ALT SERPL W P-5'-P-CCNC: 26 U/L (ref 10–52)
ANION GAP BLDV CALCULATED.4IONS-SCNC: 10 MMOL/L (ref 10–25)
ANION GAP SERPL CALC-SCNC: 11 MMOL/L (ref 10–20)
APPEARANCE UR: CLEAR
AST SERPL W P-5'-P-CCNC: 21 U/L (ref 9–39)
B-OH-BUTYR SERPL-SCNC: 0.1 MMOL/L (ref 0.02–0.27)
BASE EXCESS BLDV CALC-SCNC: -2.6 MMOL/L (ref -2–3)
BASOPHILS # BLD AUTO: 0.03 X10*3/UL (ref 0–0.1)
BASOPHILS NFR BLD AUTO: 0.6 %
BILIRUB SERPL-MCNC: 0.3 MG/DL (ref 0–1.2)
BILIRUB UR STRIP.AUTO-MCNC: NEGATIVE MG/DL
BODY TEMPERATURE: ABNORMAL
BUN SERPL-MCNC: 20 MG/DL (ref 6–23)
CA-I BLDV-SCNC: 1.17 MMOL/L (ref 1.1–1.33)
CALCIUM SERPL-MCNC: 8.2 MG/DL (ref 8.6–10.3)
CHLORIDE BLDV-SCNC: 102 MMOL/L (ref 98–107)
CHLORIDE SERPL-SCNC: 103 MMOL/L (ref 98–107)
CO2 SERPL-SCNC: 23 MMOL/L (ref 21–32)
COLOR UR: ABNORMAL
CREAT SERPL-MCNC: 1.32 MG/DL (ref 0.5–1.3)
CRITICAL CALL TIME: 1925
CRITICAL CALLED BY: ABNORMAL
CRITICAL CALLED TO: ABNORMAL
CRITICAL READ BACK: ABNORMAL
EGFRCR SERPLBLD CKD-EPI 2021: 64 ML/MIN/1.73M*2
EOSINOPHIL # BLD AUTO: 0.19 X10*3/UL (ref 0–0.7)
EOSINOPHIL NFR BLD AUTO: 3.9 %
ERYTHROCYTE [DISTWIDTH] IN BLOOD BY AUTOMATED COUNT: 13 % (ref 11.5–14.5)
GLUCOSE BLD MANUAL STRIP-MCNC: 342 MG/DL (ref 74–99)
GLUCOSE BLD MANUAL STRIP-MCNC: 468 MG/DL (ref 74–99)
GLUCOSE BLDV-MCNC: ABNORMAL MG/DL
GLUCOSE SERPL-MCNC: 501 MG/DL (ref 74–99)
GLUCOSE UR STRIP.AUTO-MCNC: ABNORMAL MG/DL
HCO3 BLDV-SCNC: 24.1 MMOL/L (ref 22–26)
HCT VFR BLD AUTO: 33.4 % (ref 41–52)
HCT VFR BLD EST: 35 % (ref 41–52)
HGB BLD-MCNC: 11.2 G/DL (ref 13.5–17.5)
HGB BLDV-MCNC: 11.7 G/DL (ref 13.5–17.5)
IMM GRANULOCYTES # BLD AUTO: 0.01 X10*3/UL (ref 0–0.7)
IMM GRANULOCYTES NFR BLD AUTO: 0.2 % (ref 0–0.9)
INHALED O2 CONCENTRATION: 50 %
KETONES UR STRIP.AUTO-MCNC: NEGATIVE MG/DL
LACTATE BLDV-SCNC: 2.2 MMOL/L (ref 0.4–2)
LACTATE BLDV-SCNC: 2.3 MMOL/L (ref 0.4–2)
LEUKOCYTE ESTERASE UR QL STRIP.AUTO: NEGATIVE
LYMPHOCYTES # BLD AUTO: 1.11 X10*3/UL (ref 1.2–4.8)
LYMPHOCYTES NFR BLD AUTO: 22.8 %
MCH RBC QN AUTO: 30.3 PG (ref 26–34)
MCHC RBC AUTO-ENTMCNC: 33.5 G/DL (ref 32–36)
MCV RBC AUTO: 90 FL (ref 80–100)
MONOCYTES # BLD AUTO: 0.29 X10*3/UL (ref 0.1–1)
MONOCYTES NFR BLD AUTO: 6 %
NEUTROPHILS # BLD AUTO: 3.23 X10*3/UL (ref 1.2–7.7)
NEUTROPHILS NFR BLD AUTO: 66.5 %
NITRITE UR QL STRIP.AUTO: NEGATIVE
NRBC BLD-RTO: 0 /100 WBCS (ref 0–0)
OXYHGB MFR BLDV: 47.2 % (ref 45–75)
PCO2 BLDV: 49 MM HG (ref 41–51)
PH BLDV: 7.3 PH (ref 7.33–7.43)
PH UR STRIP.AUTO: 5 [PH]
PLATELET # BLD AUTO: 164 X10*3/UL (ref 150–450)
PO2 BLDV: 28 MM HG (ref 35–45)
POTASSIUM BLDV-SCNC: 4.2 MMOL/L (ref 3.5–5.3)
POTASSIUM SERPL-SCNC: 4.1 MMOL/L (ref 3.5–5.3)
PROT SERPL-MCNC: 6.2 G/DL (ref 6.4–8.2)
PROT UR STRIP.AUTO-MCNC: NEGATIVE MG/DL
RBC # BLD AUTO: 3.7 X10*6/UL (ref 4.5–5.9)
RBC # UR STRIP.AUTO: NEGATIVE /UL
SAO2 % BLDV: 48 % (ref 45–75)
SODIUM BLDV-SCNC: 132 MMOL/L (ref 136–145)
SODIUM SERPL-SCNC: 133 MMOL/L (ref 136–145)
SP GR UR STRIP.AUTO: 1.02
UROBILINOGEN UR STRIP.AUTO-MCNC: <2 MG/DL
WBC # BLD AUTO: 4.9 X10*3/UL (ref 4.4–11.3)

## 2024-03-26 PROCEDURE — 82010 KETONE BODYS QUAN: CPT

## 2024-03-26 PROCEDURE — 96360 HYDRATION IV INFUSION INIT: CPT

## 2024-03-26 PROCEDURE — 84132 ASSAY OF SERUM POTASSIUM: CPT

## 2024-03-26 PROCEDURE — 36415 COLL VENOUS BLD VENIPUNCTURE: CPT

## 2024-03-26 PROCEDURE — 96361 HYDRATE IV INFUSION ADD-ON: CPT

## 2024-03-26 PROCEDURE — 82947 ASSAY GLUCOSE BLOOD QUANT: CPT | Mod: 91

## 2024-03-26 PROCEDURE — 85025 COMPLETE CBC W/AUTO DIFF WBC: CPT

## 2024-03-26 PROCEDURE — 99283 EMERGENCY DEPT VISIT LOW MDM: CPT

## 2024-03-26 PROCEDURE — 82947 ASSAY GLUCOSE BLOOD QUANT: CPT

## 2024-03-26 PROCEDURE — 2500000004 HC RX 250 GENERAL PHARMACY W/ HCPCS (ALT 636 FOR OP/ED)

## 2024-03-26 PROCEDURE — 83605 ASSAY OF LACTIC ACID: CPT

## 2024-03-26 PROCEDURE — 81003 URINALYSIS AUTO W/O SCOPE: CPT

## 2024-03-26 PROCEDURE — 84132 ASSAY OF SERUM POTASSIUM: CPT | Mod: 59

## 2024-03-26 RX ADMIN — SODIUM CHLORIDE, POTASSIUM CHLORIDE, SODIUM LACTATE AND CALCIUM CHLORIDE 1500 ML: 600; 310; 30; 20 INJECTION, SOLUTION INTRAVENOUS at 19:17

## 2024-03-26 ASSESSMENT — COLUMBIA-SUICIDE SEVERITY RATING SCALE - C-SSRS
1. IN THE PAST MONTH, HAVE YOU WISHED YOU WERE DEAD OR WISHED YOU COULD GO TO SLEEP AND NOT WAKE UP?: NO
6. HAVE YOU EVER DONE ANYTHING, STARTED TO DO ANYTHING, OR PREPARED TO DO ANYTHING TO END YOUR LIFE?: NO
2. HAVE YOU ACTUALLY HAD ANY THOUGHTS OF KILLING YOURSELF?: NO

## 2024-03-26 ASSESSMENT — LIFESTYLE VARIABLES
HAVE PEOPLE ANNOYED YOU BY CRITICIZING YOUR DRINKING: NO
TOTAL SCORE: 0
HAVE YOU EVER FELT YOU SHOULD CUT DOWN ON YOUR DRINKING: NO
EVER FELT BAD OR GUILTY ABOUT YOUR DRINKING: NO
EVER HAD A DRINK FIRST THING IN THE MORNING TO STEADY YOUR NERVES TO GET RID OF A HANGOVER: NO

## 2024-03-26 ASSESSMENT — PAIN SCALES - GENERAL
PAINLEVEL_OUTOF10: 0 - NO PAIN
PAINLEVEL_OUTOF10: 0 - NO PAIN

## 2024-03-26 ASSESSMENT — PAIN - FUNCTIONAL ASSESSMENT: PAIN_FUNCTIONAL_ASSESSMENT: 0-10

## 2024-03-26 NOTE — ED PROVIDER NOTES
HPI   Chief Complaint   Patient presents with    Hyperglycemia     Blood sugar 468 in triage.  No symptoms, he checked his sugar at home and stated it was 500.         History provided by: Patient    Limitations to history: None    CC: Hyperglycemia    HPI: 53-year-old male presents emergency department to be evaluated for hyperglycemia.  Patient states that he noticed earlier today that his blood sugar was over 400, usually runs between 100-200 Patient has a history of type 1 diabetes however he was taken off his insulin after he had multiple episodes of hypoglycemia.  Patient for the last month or so has been on weekly injections of Trulicity and another daily oral pill but he is not sure which oral pill he is on currently.  I did review the patient's previous documentations and I was unable to find what specific medications he is on.  He says that overall he feels well.  Denies lightheadedness and dizziness.  Denies chest pain or difficulty breathing, denies history of heart or lung disease.  Denies nausea vomiting, diarrhea constipation, blood in the urine or stool.  Does report some mild urinary frequency however he states that he usually is like this when his blood sugar is elevated.  Denies weakness and fatigue.  Patient does report eating Chinese food earlier today.  Denies all other systemic symptoms.    ROS: Negative unless mentioned in HPI    Social Hx: Denies tobacco, alcohol, drug use    Medical Hx: Medical history seen for hypertension, diabetes, asthma and anxiety.  Denies allergies.  Medications up-to-date.    Surgical HX: Denies    Physical exam:    Constitutional: Patient is well-nourished and well-developed.  Sitting comfortably in the room and in no distress.  Oriented to person, place, time, and situation.    HEENT: Head is normocephalic, atraumatic. Patient's airway is patent.  Tympanic membranes are clear bilaterally.  Nasal mucosa clear.  Mouth with normal mucosa.  Throat is not erythematous  and there are no oropharyngeal exudates, uvula is midline.  No obvious facial deformities.    Eyes: Clear bilaterally.  Pupils are equal round and reactive to light and accommodation.  Extraocular movements intact.      Cardiac: Regular rate, regular rhythm.  Heart sounds S1, S2.  No murmurs, rubs, or gallops.  PMI nondisplaced.  No JVD.    Respiratory: Regular respiratory rate and effort.  Breath sounds are clear and equal bilaterally, no adventitious lung sounds.  Patient is speaking in full sentences and is in no apparent respiratory distress. No use of accessory muscles.      Gastrointestinal: Abdomen is soft, nondistended, and nontender.  There are no obvious deformities.  No rebound tenderness or guarding.  Bowel sounds are normal active.    Genitourinary: No CVA or flank tenderness.    Musculoskeletal: No reproducible tenderness.  No obvious skin or bony deformities.  Patient has equal range of motion in all extremities and no strength deficiencies.  No muscle or joint tenderness. No back or neck tenderness.  Capillary refill less than 3 seconds.  Strong peripheral pulses.  No sensory deficits.    Neurological: Patient is alert and oriented.  No focal deficits.  5/5 strength in all extremities.  Cranial nerves II through XII intact. GCS15.     Skin: Skin is normal color for race and is warm, dry, and intact.  No evidence of trauma.  No lesions, rashes, bruising, jaundice, or masses.    Psych: Appropriate mood and affect.  No apparent risk to self or others.    Heme/lymph: No adenopathy, lymphadenopathy, or splenomegaly    Physical exam is otherwise negative unless stated above or in history of present illness.                          Cheikh Coma Scale Score: 15                     Patient History   Past Medical History:   Diagnosis Date    Anxiety     Asthma     Diabetes mellitus (CMS/HCC)     Hypertension      History reviewed. No pertinent surgical history.  No family history on file.  Social History      Tobacco Use    Smoking status: Never    Smokeless tobacco: Never   Vaping Use    Vaping Use: Never used   Substance Use Topics    Alcohol use: Never    Drug use: Never       Physical Exam   ED Triage Vitals [03/26/24 1840]   Temperature Heart Rate Respirations BP   36.9 °C (98.4 °F) 90 20 174/77      Pulse Ox Temp Source Heart Rate Source Patient Position   99 % Temporal Monitor Sitting      BP Location FiO2 (%)     Right arm --       Physical Exam    ED Course & MDM   Patient updated on plan for lab testing, IV insertion, radiology imaging, and medications to be administered while in the ER (if indicated). Patient updated on expected wait times for testing and results. Patient provided my name and told to ask any staff member for questions or concerns if they should arise. Electronic medical record reviewed.     MDM    Upon initial assessment, patient was healthy non-toxic appearing and in no apparent distress.     Patient presented to the emergency department with the chief complaint hyperglycemia.breath sounds are clear and equal bilaterally, 99% on room air.  No muffled heart sounds, JVD, murmur.  No peripheral edema or erythema.  Abdomen is soft, nontender nondistended on arrival to the emergency department, vital signs were within normal limits    Will give the patient a liter and a half of lactated Ringer's and will get basic blood work, urinalysis given his urinary frequency, venous blood gas, beta hydroxybutyrate.    Patient CMP shows hyperglycemia 501 with pseudohyponatremia 133.  Patient's creatinine is mildly elevated 1.32.  Calcium is 8.2.  VBG shows no findings that would suggest beta DKA.  Patient does not have any changes in his bicarb or anion gap.  Urinalysis reveals glucose but otherwise unremarkable.  Beta Doxy butyrate is negative.  Patient's blood glucose is improving to 342 after a liter and a half of fluids.  Patient CBC shows a microcytic anemia not similar to baseline.  Patient feels  well and like to go home, I do believe this is reasonable since his results and vital signs are stable and he is not in DKA.  At this time, I would not want the patient to have substantially elevated glucose levels prolonged but I am okay with him being slightly elevated especially given that he was in the ER multiple times for hypoglycemia when he was on insulin and the risks for adverse side effects and poor outcome is much higher and hyperglycemia.  I did recommend supportive treatment for the patient including diet and exercise changes, drinking plenty of fluids, and avoiding food and drink with high sugar.  Recommend that he call his endocrinologist, Dr. Murphy, tomorrow to discuss his visit to ER and see if they want to adjust any of his medications.  All questions and concerns addressed.  Reasons to return to ER discussed.  Patient verbalized understanding and agreement with the treatment plan and they remained hemodynamically stable in the ER.    This note was dictated using a speech recognition program.  While an attempt was made at proof-reading to minimize errors, minor errors in transcription may be present  Diagnoses as of 03/26/24 2140   Hyperglycemia       Medical Decision Making      Procedure  Procedures     Carlos Bhat PA-C  03/26/24 2142

## 2024-03-27 LAB — HOLD SPECIMEN: NORMAL

## 2024-09-01 ENCOUNTER — APPOINTMENT (OUTPATIENT)
Dept: RADIOLOGY | Facility: HOSPITAL | Age: 54
DRG: 639 | End: 2024-09-01
Payer: MEDICARE

## 2024-09-01 ENCOUNTER — HOSPITAL ENCOUNTER (INPATIENT)
Facility: HOSPITAL | Age: 54
DRG: 639 | End: 2024-09-01
Attending: STUDENT IN AN ORGANIZED HEALTH CARE EDUCATION/TRAINING PROGRAM | Admitting: STUDENT IN AN ORGANIZED HEALTH CARE EDUCATION/TRAINING PROGRAM
Payer: MEDICARE

## 2024-09-01 VITALS
DIASTOLIC BLOOD PRESSURE: 101 MMHG | RESPIRATION RATE: 22 BRPM | WEIGHT: 182.76 LBS | SYSTOLIC BLOOD PRESSURE: 155 MMHG | HEART RATE: 90 BPM | BODY MASS INDEX: 28.69 KG/M2 | HEIGHT: 67 IN | TEMPERATURE: 97.5 F | OXYGEN SATURATION: 100 %

## 2024-09-01 DIAGNOSIS — E16.2 HYPOGLYCEMIA: Primary | ICD-10-CM

## 2024-09-01 LAB
ALBUMIN SERPL BCP-MCNC: 3.7 G/DL (ref 3.4–5)
ALP SERPL-CCNC: 76 U/L (ref 33–120)
ALT SERPL W P-5'-P-CCNC: 19 U/L (ref 10–52)
ANION GAP SERPL CALC-SCNC: 10 MMOL/L (ref 10–20)
AST SERPL W P-5'-P-CCNC: 17 U/L (ref 9–39)
BASOPHILS # BLD AUTO: 0.03 X10*3/UL (ref 0–0.1)
BASOPHILS NFR BLD AUTO: 0.4 %
BILIRUB SERPL-MCNC: 0.3 MG/DL (ref 0–1.2)
BUN SERPL-MCNC: 21 MG/DL (ref 6–23)
CALCIUM SERPL-MCNC: 9.1 MG/DL (ref 8.6–10.3)
CHLORIDE SERPL-SCNC: 108 MMOL/L (ref 98–107)
CO2 SERPL-SCNC: 25 MMOL/L (ref 21–32)
CREAT SERPL-MCNC: 1.19 MG/DL (ref 0.5–1.3)
EGFRCR SERPLBLD CKD-EPI 2021: 72 ML/MIN/1.73M*2
EOSINOPHIL # BLD AUTO: 0.17 X10*3/UL (ref 0–0.7)
EOSINOPHIL NFR BLD AUTO: 2.5 %
ERYTHROCYTE [DISTWIDTH] IN BLOOD BY AUTOMATED COUNT: 13 % (ref 11.5–14.5)
GLUCOSE BLD MANUAL STRIP-MCNC: 16 MG/DL (ref 74–99)
GLUCOSE BLD MANUAL STRIP-MCNC: 168 MG/DL (ref 74–99)
GLUCOSE BLD MANUAL STRIP-MCNC: 18 MG/DL (ref 74–99)
GLUCOSE BLD MANUAL STRIP-MCNC: 184 MG/DL (ref 74–99)
GLUCOSE BLD MANUAL STRIP-MCNC: 199 MG/DL (ref 74–99)
GLUCOSE BLD MANUAL STRIP-MCNC: 201 MG/DL (ref 74–99)
GLUCOSE BLD MANUAL STRIP-MCNC: 241 MG/DL (ref 74–99)
GLUCOSE BLD MANUAL STRIP-MCNC: 63 MG/DL (ref 74–99)
GLUCOSE BLD MANUAL STRIP-MCNC: 74 MG/DL (ref 74–99)
GLUCOSE BLD MANUAL STRIP-MCNC: 76 MG/DL (ref 74–99)
GLUCOSE BLD MANUAL STRIP-MCNC: 87 MG/DL (ref 74–99)
GLUCOSE SERPL-MCNC: 22 MG/DL (ref 74–99)
HCT VFR BLD AUTO: 35.5 % (ref 41–52)
HGB BLD-MCNC: 11.9 G/DL (ref 13.5–17.5)
HOLD SPECIMEN: NORMAL
IMM GRANULOCYTES # BLD AUTO: 0.01 X10*3/UL (ref 0–0.7)
IMM GRANULOCYTES NFR BLD AUTO: 0.1 % (ref 0–0.9)
LIPASE SERPL-CCNC: 22 U/L (ref 9–82)
LYMPHOCYTES # BLD AUTO: 0.83 X10*3/UL (ref 1.2–4.8)
LYMPHOCYTES NFR BLD AUTO: 12.3 %
MAGNESIUM SERPL-MCNC: 1.89 MG/DL (ref 1.6–2.4)
MCH RBC QN AUTO: 30.8 PG (ref 26–34)
MCHC RBC AUTO-ENTMCNC: 33.5 G/DL (ref 32–36)
MCV RBC AUTO: 92 FL (ref 80–100)
MONOCYTES # BLD AUTO: 0.48 X10*3/UL (ref 0.1–1)
MONOCYTES NFR BLD AUTO: 7.1 %
NEUTROPHILS # BLD AUTO: 5.25 X10*3/UL (ref 1.2–7.7)
NEUTROPHILS NFR BLD AUTO: 77.6 %
NRBC BLD-RTO: 0 /100 WBCS (ref 0–0)
PLATELET # BLD AUTO: 192 X10*3/UL (ref 150–450)
POTASSIUM SERPL-SCNC: 3.8 MMOL/L (ref 3.5–5.3)
PROT SERPL-MCNC: 6.3 G/DL (ref 6.4–8.2)
RBC # BLD AUTO: 3.86 X10*6/UL (ref 4.5–5.9)
SODIUM SERPL-SCNC: 139 MMOL/L (ref 136–145)
WBC # BLD AUTO: 6.8 X10*3/UL (ref 4.4–11.3)

## 2024-09-01 PROCEDURE — 83690 ASSAY OF LIPASE: CPT | Performed by: STUDENT IN AN ORGANIZED HEALTH CARE EDUCATION/TRAINING PROGRAM

## 2024-09-01 PROCEDURE — 99291 CRITICAL CARE FIRST HOUR: CPT | Performed by: STUDENT IN AN ORGANIZED HEALTH CARE EDUCATION/TRAINING PROGRAM

## 2024-09-01 PROCEDURE — 71045 X-RAY EXAM CHEST 1 VIEW: CPT

## 2024-09-01 PROCEDURE — 83036 HEMOGLOBIN GLYCOSYLATED A1C: CPT | Mod: ELYLAB

## 2024-09-01 PROCEDURE — 84681 ASSAY OF C-PEPTIDE: CPT | Mod: ELYLAB

## 2024-09-01 PROCEDURE — 82947 ASSAY GLUCOSE BLOOD QUANT: CPT

## 2024-09-01 PROCEDURE — 85025 COMPLETE CBC W/AUTO DIFF WBC: CPT | Performed by: STUDENT IN AN ORGANIZED HEALTH CARE EDUCATION/TRAINING PROGRAM

## 2024-09-01 PROCEDURE — 36415 COLL VENOUS BLD VENIPUNCTURE: CPT | Performed by: STUDENT IN AN ORGANIZED HEALTH CARE EDUCATION/TRAINING PROGRAM

## 2024-09-01 PROCEDURE — 99291 CRITICAL CARE FIRST HOUR: CPT

## 2024-09-01 PROCEDURE — 2500000004 HC RX 250 GENERAL PHARMACY W/ HCPCS (ALT 636 FOR OP/ED)

## 2024-09-01 PROCEDURE — 2500000004 HC RX 250 GENERAL PHARMACY W/ HCPCS (ALT 636 FOR OP/ED): Performed by: STUDENT IN AN ORGANIZED HEALTH CARE EDUCATION/TRAINING PROGRAM

## 2024-09-01 PROCEDURE — 96374 THER/PROPH/DIAG INJ IV PUSH: CPT

## 2024-09-01 PROCEDURE — 2500000001 HC RX 250 WO HCPCS SELF ADMINISTERED DRUGS (ALT 637 FOR MEDICARE OP)

## 2024-09-01 PROCEDURE — 96376 TX/PRO/DX INJ SAME DRUG ADON: CPT

## 2024-09-01 PROCEDURE — G0378 HOSPITAL OBSERVATION PER HR: HCPCS

## 2024-09-01 PROCEDURE — 80053 COMPREHEN METABOLIC PANEL: CPT | Performed by: STUDENT IN AN ORGANIZED HEALTH CARE EDUCATION/TRAINING PROGRAM

## 2024-09-01 PROCEDURE — 83527 ASSAY OF INSULIN: CPT

## 2024-09-01 PROCEDURE — 2020000001 HC ICU ROOM DAILY

## 2024-09-01 PROCEDURE — 83735 ASSAY OF MAGNESIUM: CPT | Performed by: STUDENT IN AN ORGANIZED HEALTH CARE EDUCATION/TRAINING PROGRAM

## 2024-09-01 PROCEDURE — 96360 HYDRATION IV INFUSION INIT: CPT | Mod: 59

## 2024-09-01 PROCEDURE — 36415 COLL VENOUS BLD VENIPUNCTURE: CPT

## 2024-09-01 PROCEDURE — 71045 X-RAY EXAM CHEST 1 VIEW: CPT | Mod: FOREIGN READ | Performed by: RADIOLOGY

## 2024-09-01 PROCEDURE — 96361 HYDRATE IV INFUSION ADD-ON: CPT

## 2024-09-01 PROCEDURE — 2500000005 HC RX 250 GENERAL PHARMACY W/O HCPCS

## 2024-09-01 RX ORDER — BUSPIRONE HYDROCHLORIDE 5 MG/1
10 TABLET ORAL 2 TIMES DAILY
Status: DISCONTINUED | OUTPATIENT
Start: 2024-09-01 | End: 2024-09-02 | Stop reason: HOSPADM

## 2024-09-01 RX ORDER — ACETAMINOPHEN 325 MG/1
650 TABLET ORAL EVERY 6 HOURS PRN
Status: DISCONTINUED | OUTPATIENT
Start: 2024-09-01 | End: 2024-09-02 | Stop reason: HOSPADM

## 2024-09-01 RX ORDER — DULAGLUTIDE 3 MG/.5ML
3 INJECTION, SOLUTION SUBCUTANEOUS
COMMUNITY

## 2024-09-01 RX ORDER — DEXTROSE, SODIUM CHLORIDE, SODIUM LACTATE, POTASSIUM CHLORIDE, AND CALCIUM CHLORIDE 5; .6; .31; .03; .02 G/100ML; G/100ML; G/100ML; G/100ML; G/100ML
125 INJECTION, SOLUTION INTRAVENOUS CONTINUOUS
Status: DISCONTINUED | OUTPATIENT
Start: 2024-09-01 | End: 2024-09-01

## 2024-09-01 RX ORDER — ATORVASTATIN CALCIUM 10 MG/1
10 TABLET, FILM COATED ORAL NIGHTLY
Status: DISCONTINUED | OUTPATIENT
Start: 2024-09-01 | End: 2024-09-02 | Stop reason: HOSPADM

## 2024-09-01 RX ORDER — BENZTROPINE MESYLATE 1 MG/1
0.5 TABLET ORAL NIGHTLY
Status: DISCONTINUED | OUTPATIENT
Start: 2024-09-01 | End: 2024-09-02 | Stop reason: HOSPADM

## 2024-09-01 RX ORDER — PIOGLITAZONEHYDROCHLORIDE 30 MG/1
30 TABLET ORAL DAILY
COMMUNITY

## 2024-09-01 RX ORDER — DEXTROSE 50 % IN WATER (D50W) INTRAVENOUS SYRINGE
Status: COMPLETED
Start: 2024-09-01 | End: 2024-09-01

## 2024-09-01 RX ORDER — FLUTICASONE FUROATE AND VILANTEROL 100; 25 UG/1; UG/1
1 POWDER RESPIRATORY (INHALATION)
Status: DISCONTINUED | OUTPATIENT
Start: 2024-09-01 | End: 2024-09-02 | Stop reason: HOSPADM

## 2024-09-01 RX ORDER — DEXTROSE 50 % IN WATER (D50W) INTRAVENOUS SYRINGE
25 ONCE
Status: COMPLETED | OUTPATIENT
Start: 2024-09-01 | End: 2024-09-01

## 2024-09-01 RX ORDER — BENZTROPINE MESYLATE 1 MG/1
0.5 TABLET ORAL 2 TIMES DAILY
Status: DISCONTINUED | OUTPATIENT
Start: 2024-09-01 | End: 2024-09-01

## 2024-09-01 RX ORDER — BUDESONIDE AND FORMOTEROL FUMARATE DIHYDRATE 80; 4.5 UG/1; UG/1
2 AEROSOL RESPIRATORY (INHALATION)
COMMUNITY

## 2024-09-01 RX ORDER — ZIPRASIDONE HYDROCHLORIDE 40 MG/1
160 CAPSULE ORAL
Status: DISCONTINUED | OUTPATIENT
Start: 2024-09-01 | End: 2024-09-02 | Stop reason: HOSPADM

## 2024-09-01 RX ORDER — DEXTROSE 50 % IN WATER (D50W) INTRAVENOUS SYRINGE
Status: DISPENSED
Start: 2024-09-01 | End: 2024-09-02

## 2024-09-01 RX ORDER — DEXTROSE MONOHYDRATE 100 MG/ML
50 INJECTION, SOLUTION INTRAVENOUS CONTINUOUS
Status: DISCONTINUED | OUTPATIENT
Start: 2024-09-01 | End: 2024-09-01

## 2024-09-01 RX ORDER — FLUOXETINE HYDROCHLORIDE 20 MG/1
20 CAPSULE ORAL DAILY
Status: DISCONTINUED | OUTPATIENT
Start: 2024-09-01 | End: 2024-09-02 | Stop reason: HOSPADM

## 2024-09-01 RX ORDER — ALBUTEROL SULFATE 90 UG/1
2 INHALANT RESPIRATORY (INHALATION) EVERY 6 HOURS PRN
Status: DISCONTINUED | OUTPATIENT
Start: 2024-09-01 | End: 2024-09-02 | Stop reason: HOSPADM

## 2024-09-01 RX ORDER — BUSPIRONE HYDROCHLORIDE 10 MG/1
10 TABLET ORAL 2 TIMES DAILY
COMMUNITY

## 2024-09-01 RX ORDER — ALBUTEROL SULFATE 90 UG/1
2 INHALANT RESPIRATORY (INHALATION) EVERY 6 HOURS PRN
COMMUNITY

## 2024-09-01 RX ADMIN — DEXTROSE MONOHYDRATE 25 G: 25 INJECTION, SOLUTION INTRAVENOUS at 16:18

## 2024-09-01 RX ADMIN — DEXTROSE MONOHYDRATE 75 ML/HR: 100 INJECTION, SOLUTION INTRAVENOUS at 16:03

## 2024-09-01 RX ADMIN — BENZTROPINE MESYLATE 0.5 MG: 1 TABLET ORAL at 20:14

## 2024-09-01 RX ADMIN — DEXTROSE MONOHYDRATE 25 G: 25 INJECTION, SOLUTION INTRAVENOUS at 14:57

## 2024-09-01 RX ADMIN — BUSPIRONE HYDROCHLORIDE 10 MG: 5 TABLET ORAL at 20:14

## 2024-09-01 RX ADMIN — SODIUM CHLORIDE, SODIUM LACTATE, POTASSIUM CHLORIDE, CALCIUM CHLORIDE AND DEXTROSE MONOHYDRATE 125 ML/HR: 5; 600; 310; 30; 20 INJECTION, SOLUTION INTRAVENOUS at 15:55

## 2024-09-01 RX ADMIN — DEXTROSE 50 % IN WATER (D50W) INTRAVENOUS SYRINGE 25 G: at 16:18

## 2024-09-01 RX ADMIN — ZIPRASIDONE HYDROCHLORIDE 160 MG: 40 CAPSULE ORAL at 20:14

## 2024-09-01 RX ADMIN — ATORVASTATIN CALCIUM 10 MG: 10 TABLET, FILM COATED ORAL at 20:14

## 2024-09-01 RX ADMIN — DEXTROSE 50 % IN WATER (D50W) INTRAVENOUS SYRINGE 25 G: at 14:57

## 2024-09-01 SDOH — ECONOMIC STABILITY: INCOME INSECURITY: HOW HARD IS IT FOR YOU TO PAY FOR THE VERY BASICS LIKE FOOD, HOUSING, MEDICAL CARE, AND HEATING?: NOT VERY HARD

## 2024-09-01 SDOH — SOCIAL STABILITY: SOCIAL INSECURITY: HAVE YOU HAD THOUGHTS OF HARMING ANYONE ELSE?: NO

## 2024-09-01 SDOH — SOCIAL STABILITY: SOCIAL INSECURITY: HAVE YOU HAD ANY THOUGHTS OF HARMING ANYONE ELSE?: NO

## 2024-09-01 SDOH — ECONOMIC STABILITY: HOUSING INSECURITY: AT ANY TIME IN THE PAST 12 MONTHS, WERE YOU HOMELESS OR LIVING IN A SHELTER (INCLUDING NOW)?: NO

## 2024-09-01 SDOH — SOCIAL STABILITY: SOCIAL INSECURITY: ARE YOU OR HAVE YOU BEEN THREATENED OR ABUSED PHYSICALLY, EMOTIONALLY, OR SEXUALLY BY ANYONE?: NO

## 2024-09-01 SDOH — ECONOMIC STABILITY: INCOME INSECURITY: IN THE LAST 12 MONTHS, WAS THERE A TIME WHEN YOU WERE NOT ABLE TO PAY THE MORTGAGE OR RENT ON TIME?: NO

## 2024-09-01 SDOH — SOCIAL STABILITY: SOCIAL INSECURITY: DO YOU FEEL ANYONE HAS EXPLOITED OR TAKEN ADVANTAGE OF YOU FINANCIALLY OR OF YOUR PERSONAL PROPERTY?: NO

## 2024-09-01 SDOH — SOCIAL STABILITY: SOCIAL INSECURITY: DOES ANYONE TRY TO KEEP YOU FROM HAVING/CONTACTING OTHER FRIENDS OR DOING THINGS OUTSIDE YOUR HOME?: NO

## 2024-09-01 SDOH — SOCIAL STABILITY: SOCIAL INSECURITY: WERE YOU ABLE TO COMPLETE ALL THE BEHAVIORAL HEALTH SCREENINGS?: YES

## 2024-09-01 SDOH — SOCIAL STABILITY: SOCIAL INSECURITY: ABUSE: ADULT

## 2024-09-01 SDOH — SOCIAL STABILITY: SOCIAL INSECURITY: HAS ANYONE EVER THREATENED TO HURT YOUR FAMILY OR YOUR PETS?: NO

## 2024-09-01 SDOH — SOCIAL STABILITY: SOCIAL INSECURITY: DO YOU FEEL UNSAFE GOING BACK TO THE PLACE WHERE YOU ARE LIVING?: NO

## 2024-09-01 SDOH — SOCIAL STABILITY: SOCIAL INSECURITY: ARE THERE ANY APPARENT SIGNS OF INJURIES/BEHAVIORS THAT COULD BE RELATED TO ABUSE/NEGLECT?: NO

## 2024-09-01 ASSESSMENT — COGNITIVE AND FUNCTIONAL STATUS - GENERAL
PATIENT BASELINE BEDBOUND: NO
DAILY ACTIVITIY SCORE: 24
MOBILITY SCORE: 24

## 2024-09-01 ASSESSMENT — LIFESTYLE VARIABLES
AUDIT-C TOTAL SCORE: 0
TOTAL SCORE: 0
HOW OFTEN DO YOU HAVE A DRINK CONTAINING ALCOHOL: NEVER
HAVE PEOPLE ANNOYED YOU BY CRITICIZING YOUR DRINKING: NO
AUDIT-C TOTAL SCORE: 0
EVER HAD A DRINK FIRST THING IN THE MORNING TO STEADY YOUR NERVES TO GET RID OF A HANGOVER: NO
HOW OFTEN DO YOU HAVE 6 OR MORE DRINKS ON ONE OCCASION: NEVER
EVER FELT BAD OR GUILTY ABOUT YOUR DRINKING: NO
HOW MANY STANDARD DRINKS CONTAINING ALCOHOL DO YOU HAVE ON A TYPICAL DAY: PATIENT DOES NOT DRINK
HAVE YOU EVER FELT YOU SHOULD CUT DOWN ON YOUR DRINKING: NO
SKIP TO QUESTIONS 9-10: 1

## 2024-09-01 ASSESSMENT — ACTIVITIES OF DAILY LIVING (ADL)
BATHING: INDEPENDENT
DRESSING YOURSELF: INDEPENDENT
TOILETING: INDEPENDENT
PATIENT'S MEMORY ADEQUATE TO SAFELY COMPLETE DAILY ACTIVITIES?: YES
JUDGMENT_ADEQUATE_SAFELY_COMPLETE_DAILY_ACTIVITIES: YES
HEARING - RIGHT EAR: FUNCTIONAL
GROOMING: INDEPENDENT
ADEQUATE_TO_COMPLETE_ADL: YES
FEEDING YOURSELF: INDEPENDENT
WALKS IN HOME: INDEPENDENT
HEARING - LEFT EAR: FUNCTIONAL

## 2024-09-01 ASSESSMENT — PAIN - FUNCTIONAL ASSESSMENT
PAIN_FUNCTIONAL_ASSESSMENT: 0-10
PAIN_FUNCTIONAL_ASSESSMENT: 0-10

## 2024-09-01 ASSESSMENT — PAIN SCALES - GENERAL
PAINLEVEL_OUTOF10: 0 - NO PAIN
PAINLEVEL_OUTOF10: 0 - NO PAIN

## 2024-09-01 NOTE — H&P
[facility] Critical Care Medicine       Date:  9/1/2024  Patient:  Carlyle Houser  YOB: 1970  MRN:  [id number]   Admit Date:  9/1/2024  ========================================================================================================    Chief Complaint   Patient presents with    Hypoglycemia         History of Present Illness:  Carlyle Houser is a 54 y.o. year old male patient with Past Medical History of DM2, prior osteomyelitis of left foot, LOYDA, asthma, polyneuropathy, Charcot deformity, diabetic retinopathy, HLD, HTN, anxiety/depression, bipolar disorder.  Patient presented to [facility] emergency department 9/1 with complaints of persistent hypoglycemia.  At home patient takes Trulicity, lispro with meals, pioglitazone.  He follows with Dr. Murphy.  He states that his Trulicity dosing was recently increased, his last dose of Trulicity was Saturday 8/31.  He denies any other medication changes.  He has not been taking his meal coverage insulin because he states his glucoses at home have been running as low as 40.  He was asymptomatic at that time.  However his glucose today at home was 20 and he was diaphoretic, lethargic and not feeling well therefore he sought treatment emergency department.  Denies any fevers or recent illnesses.  Denies any chest pain or shortness of breath.  Denies any nausea or vomiting.  Has been eating well and staying hydrated.    In ED patient afebrile, normotensive, hemodynamically stable.  On room air.  Glucose for EMS 26, given D50, on arrival glucose of 74.  Glucose then dropped as low as 18 and patient was very symptomatic, given additional amp of D50.  He was then initiated on D10 infusion at 75 mL/h.  Laboratory workup largely unremarkable. Admitted to ICU for close monitoring.      Interval ICU Events:  9/1: Admitted to ICU for persistent hypoglycemia. Likely needs medication adjustments by endocrinology as been hypoglycemic at home for some time. Hemodynamically  stable. Initiated on D10 at 100 ml/hr.    Medical History:  Past Medical History:   Diagnosis Date    Anxiety     Asthma ([facility]-HCC)     Diabetes mellitus (Multi)     Hypertension      No past surgical history on file.  (Not in a hospital admission)    Patient has no known allergies.  Social History     Tobacco Use    Smoking status: Never    Smokeless tobacco: Never   Vaping Use    Vaping status: Never Used   Substance Use Topics    Alcohol use: Never    Drug use: Never     No family history on file.    Hospital Medications:    dextrose 10 % in water (D10W), 75 mL/hr, Last Rate: 75 mL/hr (09/01/24 1603)          Current Facility-Administered Medications:     dextrose 10 % in water (D10W) infusion, 75 mL/hr, intravenous, Continuous, Darinel Centeno, APRN-CNP, Last Rate: 75 mL/hr at 09/01/24 1603, 75 mL/hr at 09/01/24 1603    iohexol (OMNIPaque) 350 mg iodine/mL solution 75 mL, 75 mL, intravenous, Once in imaging, Janak Pollock,     Current Outpatient Medications:     atorvastatin (Lipitor) 20 mg tablet, Take 0.5 tablets (10 mg) by mouth once daily., Disp: , Rfl:     benztropine (Cogentin) 0.5 mg tablet, Take 1 tablet (0.5 mg) by mouth 2 times a day., Disp: , Rfl:     FLUoxetine (PROzac) 20 mg capsule, Take 1 capsule (20 mg) by mouth once daily., Disp: , Rfl:     insulin lispro (HumaLOG) 100 unit/mL injection, Inject 0.06 mL (6 Units) under the skin 3 times a day with meals. Take as directed per insulin instructions., Disp: 5.4 mL, Rfl: 0    insulin lispro (HumaLOG) 100 unit/mL injection, Inject 0-0.1 mL (0-10 Units) under the skin 4 times a day with meals. Take as directed per insulin instructions., Disp: 12 mL, Rfl: 0    ziprasidone (Geodon) 80 mg capsule, Take 2 capsules (160 mg) by mouth once every day. At hs, Disp: , Rfl:     Review of Systems:  14 point review of systems was completed and negative except for those specially mention in my HPI    Physical Exam:    Heart Rate:  []   Temperature:  [36.4  "°C (97.5 °F)]   Respirations:  [18-22]   BP: (118-162)/(65-68)   Height:  [170.2 cm (5' 7\")]   Weight:  [82.6 kg (182 lb)]   Pulse Ox:  [94 %-100 %]     Physical Exam  Constitutional:       General: He is not in acute distress.     Appearance: Normal appearance. He is not ill-appearing or diaphoretic.   HENT:      Head: Normocephalic and atraumatic.      Mouth/Throat:      Mouth: Mucous membranes are moist.      Pharynx: Oropharynx is clear. No oropharyngeal exudate.   Eyes:      General: No scleral icterus.     Extraocular Movements: Extraocular movements intact.      Pupils: Pupils are equal, round, and reactive to light.   Cardiovascular:      Rate and Rhythm: Normal rate and regular rhythm.      Pulses: Normal pulses.      Heart sounds: Normal heart sounds. No murmur heard.     No friction rub. No gallop.   Pulmonary:      Effort: Pulmonary effort is normal. No respiratory distress.      Breath sounds: Normal breath sounds. No stridor. No wheezing or rales.   Abdominal:      General: There is no distension.      Palpations: Abdomen is soft.      Tenderness: There is no abdominal tenderness.   Musculoskeletal:         General: Normal range of motion.      Cervical back: Normal range of motion and neck supple.      Right lower leg: No edema.      Left lower leg: No edema.   Skin:     General: Skin is warm and dry.      Capillary Refill: Capillary refill takes less than 2 seconds.   Neurological:      General: No focal deficit present.      Mental Status: He is alert and oriented to person, place, and time. Mental status is at baseline.         Objective:    I have reviewed all medications, laboratory results, and imaging pertinent for today's encounter.         No intake or output data in the 24 hours ending 09/01/24 9868      Assessment/Plan:    I am currently managing this critically ill patient for the following problems:    Neuro/Psych/Pain Ctrl/Sedation:  #History of Anxiety/Depression, Bipolar " Disorder  Mentation at baseline  -- Continue home Cogentin, buspirone, fluoxetine, Geodon    Respiratory/ENT:  #Hx Asthma, LOYDA  -- Can order nocturnal CPAP if needed  -- Continue home albuterol inhaler as needed and home Symbicort inhaler  -- On room air    Cardiovascular:  #Hx HTN, HLD  -- Continue home atorvastatin    GI:  No acute issues  -- Regular Diet    Renal/Volume Status (Intra & Extravascular):  No acute issues  -- Monitor intake and output  -- Daily RFP and electrolyte repletion    Endocrine  #DM2 with Persistent Hypoglycemia  Likely needs medication adjustment as persistently hypoglycemic at home  -- Hold on pioglitazone, Trulicity, and insulin  -- D10 infusion at 100 mL/hr every 1 hour glucose checks  -- Goal glucose 120-180, adjust D10 as tolerated  -- If continued hypoglycemia despite D10 can give 100 mcg IM Octreotide  -- Consult Dr. Murphy for assistance with medication adjustments    Infectious Disease:  No concerns for active infection, defer antibiotics    Heme/Onc:  No acute issues  -- Daily CBC    ORTHO/MSK:  Independent, no needs    Ethics/Code Status:  FULL CODE    :  DVT Prophylaxis: Low risk, ambulate  GI Prophylaxis: None  Bowel Regimen: PRN  Diet: Regular  CVC: No  Velvet: No  Gilbert: No  Restraints: No  Dispo: ICU    Critical Care Time:  35 minutes spent in preparing to see patient (I.e. review of medical records), evaluation of diagnostics (I.e. labs, imaging, etc.), documentation, discussing plan of care with patient/ family/ caregiver, and/ or coordination of care with multidisciplinary team. Time does not include completion of procedure time.       Darinel Centeno, APRN-CNP  Pulmonary & Critical Care Medicine  [facility]

## 2024-09-01 NOTE — CONSULTS
Consults    Assessment/Plan      Persistent hypoglycemia and diabetic    Patient has been on Trulicity long-acting which does not cause any hypoglycemia alone.  He is also on pioglitazone which does not cause hypoglycemia.  The only hypoglycemic agent is lispro with meals and he states he did not take it today but the patient has a prior history of being awake historian and his severe hypoglycemia is not accountable unless it insulin.  He has been on D10 at 100 cc/h will continue this for now if his glucoses persistently stable 200 the D10 drip rate should be reduced to half again if it stays above 200 for 2 more hours should be have to get and again if it stays up he should have it discontinued later I am to be called if there are any questions he can be started on sliding scale in the future low-dose if needed and is persistently hyperglycemic without D10.    Reason For Consult  DM -persistant severe hypoglycemia    History Of Present Illness  Carlyle Houser is a 54 y.o. male with  has a past medical history of Anxiety, Asthma ([facility]), Diabetes mellitus (Multi), and Hypertension. presenting with persistant hypoglycemia-patient has been on Trulicity at home which she does not put hypoglycemia and a sliding scale as needed.  He is not supposed to be on long-acting Lantus and persistent hypoglycemia is unclear as patient states he has not taken any insulin today earlier as discussed with the ICU nurse who checked again with the patient.  He is awake alert.  The patient is sometimes not very clear on his history and he does at home and has a little difficulty with recall comprehension at times.  -History of Present Illness:  Carylle Houser is a 54 y.o. year old male patient with Past Medical History of DM2, prior osteomyelitis of left foot, LOYDA, asthma, polyneuropathy, Charcot deformity, diabetic retinopathy, HLD, HTN, anxiety/depression, bipolar disorder.  Patient presented to [facility] emergency department 9/1 with  complaints of persistent hypoglycemia.  At home patient takes Trulicity, lispro with meals, pioglitazone.  He follows with Dr. Murphy.  He states that his Trulicity dosing was recently increased, his last dose of Trulicity was Saturday 8/31.  He denies any other medication changes.  He has not been taking his meal coverage insulin because he states his glucoses at home have been running as low as 40.  He was asymptomatic at that time.  However his glucose today at home was 20 and he was diaphoretic, lethargic and not feeling well therefore he sought treatment emergency department.  Denies any fevers or recent illnesses.  Denies any chest pain or shortness of breath.  Denies any nausea or vomiting.  Has been eating well and staying hydrated.     In ED patient afebrile, normotensive, hemodynamically stable.  On room air.  Glucose for EMS 26, given D50, on arrival glucose of 74.  Glucose then dropped as low as 18 and patient was very symptomatic, given additional amp of D50.  He was then initiated on D10 infusion at 75 mL/h.  Laboratory workup largely unremarkable. Admitted to ICU for close monitoring.        Past Medical History  He has a past medical history of Anxiety, Asthma ([facility]-HCC), Diabetes mellitus (Multi), and Hypertension.    Surgical History  He has no past surgical history on file.     Social History  He reports that he has never smoked. He has never used smokeless tobacco. He reports that he does not drink alcohol and does not use drugs.    Family History  No family history on file.     Allergies  Patient has no known allergies.    Review of Systems    Past Medical History:   Diagnosis Date    Anxiety     Asthma ([facility]-HCC)     Diabetes mellitus (Multi)     Hypertension        History reviewed. No pertinent surgical history.    Social History     Socioeconomic History    Marital status:      Spouse name: Not on file    Number of children: Not on file    Years of education: Not on file    Highest  education level: Not on file   Occupational History    Not on file   Tobacco Use    Smoking status: Never    Smokeless tobacco: Never   Vaping Use    Vaping status: Never Used   Substance and Sexual Activity    Alcohol use: Never    Drug use: Never    Sexual activity: Never   Other Topics Concern    Not on file   Social History Narrative    Not on file     Social Determinants of Health     Financial Resource Strain: Medium Risk (1/11/2024)    Overall Financial Resource Strain (CARDIA)     Difficulty of Paying Living Expenses: Somewhat hard   Food Insecurity: No Food Insecurity (1/11/2024)    Hunger Vital Sign     Worried About Running Out of Food in the Last Year: Never true     Ran Out of Food in the Last Year: Never true   Transportation Needs: No Transportation Needs (1/11/2024)    PRAPARE - Transportation     Lack of Transportation (Medical): No     Lack of Transportation (Non-Medical): No   Physical Activity: Inactive (1/11/2024)    Exercise Vital Sign     Days of Exercise per Week: 0 days     Minutes of Exercise per Session: 0 min   Stress: No Stress Concern Present (1/11/2024)    [employer] - Occupational Stress Questionnaire     Feeling of Stress : Not at all   Social Connections: Unknown (1/11/2024)    Social Connection and Isolation Panel [NHANES]     Frequency of Communication with Friends and Family: Never     Frequency of Social Gatherings with Friends and Family: Once a week     Attends Nondenominational Services: Never     Active Member of Clubs or Organizations: No     Attends Club or Organization Meetings: Never     Marital Status: Patient declined   Intimate Partner Violence: Not At Risk (1/11/2024)    Humiliation, Afraid, Rape, and Kick questionnaire     Fear of Current or Ex-Partner: No     Emotionally Abused: No     Physically Abused: No     Sexually Abused: No   Housing Stability: Low Risk  (1/11/2024)    Housing Stability Vital Sign     Unable to Pay for Housing in the  "Last Year: No     Number of Places Lived in the Last Year: 0     Unstable Housing in the Last Year: No        Physical Exam  Deferred  ROS, PMH, FH/SH, surgical history and allergies have been reviewed.    Last Recorded Vitals  Blood pressure 153/78, pulse 84, temperature 36.4 °C (97.5 °F), temperature source Temporal, resp. rate 26, height 1.702 m (5' 7\"), weight 82.9 kg (182 lb 12.2 oz), SpO2 100%.    Relevant Results  Results from last 7 days   Lab Units 09/01/24  1702 09/01/24  1634 09/01/24  1545 09/01/24  1449 09/01/24  1440 09/01/24  1422   POCT GLUCOSE mg/dL 87 63* 16* 18*  --  74   GLUCOSE mg/dL  --   --   --   --  22*  --      Lab Results   Component Value Date    HGBA1C 6.9 (H) 03/22/2024          Storm Murphy MD FACE  Office phone - [phone]  Fax - [phone]  Address: [address] [address]  Address: [address] [id number]  9/1/2024  5:04 PM  "

## 2024-09-01 NOTE — ED PROVIDER NOTES
HPI   Chief Complaint   Patient presents with    Hypoglycemia       Patient is a 54-year-old male presenting to the emergency department for complaints of hyperglycemia.  Patient states that he awoke feeling normal today but did have an episode of hypoglycemia that was treated by EMS with D10 infusion.  Patient was answering all questions on arrival to the emergency department denied any fevers, chills, nausea, vomiting, diarrhea.  Patient endorsed that he is on Trulicity and took that yesterday evening but denies taking any additional medications today.  No other complaints were noted.  Patient's wife arrived at bedside and confirm the patient's series of events.  No additional history or complaints noted.      History provided by:  Patient, EMS personnel and relative          Patient History   Past Medical History:   Diagnosis Date    Anxiety     Asthma ([facility])     Diabetes mellitus (Multi)     Hypertension      No past surgical history on file.  No family history on file.  Social History     Tobacco Use    Smoking status: Never    Smokeless tobacco: Never   Vaping Use    Vaping status: Never Used   Substance Use Topics    Alcohol use: Never    Drug use: Never       Physical Exam   ED Triage Vitals [09/01/24 1432]   Temperature Heart Rate Respirations BP   36.4 °C (97.5 °F) 96 18 118/65      Pulse Ox Temp Source Heart Rate Source Patient Position   94 % Temporal Monitor Sitting      BP Location FiO2 (%)     Right arm --       Physical Exam  Vitals and nursing note reviewed.   Constitutional:       General: He is not in acute distress.     Appearance: Normal appearance. He is not ill-appearing, toxic-appearing or diaphoretic.   HENT:      Head: Normocephalic and atraumatic.      Nose: Nose normal.      Mouth/Throat:      Mouth: Mucous membranes are moist.      Pharynx: No oropharyngeal exudate or posterior oropharyngeal erythema.   Eyes:      General: No scleral icterus.     Extraocular Movements: Extraocular  movements intact.      Pupils: Pupils are equal, round, and reactive to light.   Cardiovascular:      Rate and Rhythm: Normal rate and regular rhythm.      Pulses: Normal pulses.      Heart sounds: Normal heart sounds. No murmur heard.     No friction rub. No gallop.   Pulmonary:      Effort: Pulmonary effort is normal. No respiratory distress.      Breath sounds: Normal breath sounds. No stridor. No wheezing, rhonchi or rales.   Chest:      Chest wall: No tenderness.   Abdominal:      General: Abdomen is flat. There is no distension.      Palpations: Abdomen is soft. There is no mass.      Tenderness: There is no abdominal tenderness. There is no guarding.      Hernia: No hernia is present.   Musculoskeletal:         General: No swelling, tenderness, deformity or signs of injury. Normal range of motion.      Cervical back: Normal range of motion and neck supple. No rigidity.   Skin:     General: Skin is warm and dry.      Capillary Refill: Capillary refill takes less than 2 seconds.      Coloration: Skin is not jaundiced or pale.      Findings: No bruising, erythema, lesion or rash.   Neurological:      General: No focal deficit present.      Mental Status: He is alert and oriented to person, place, and time. Mental status is at baseline.   Psychiatric:         Mood and Affect: Mood normal.         Behavior: Behavior normal.           ED Course & MDM   Diagnoses as of 09/01/24 1639   Hypoglycemia                 No data recorded     Cheikh Coma Scale Score: 15 (09/01/24 1436 : Viridiana Cruz RN)                           Medical Decision Making  Patient a 54-year-old male presented to the emergency department complaints of hypoglycemia.  Patient was given D10 infusion via EMS.  Patient was answering all questions initially and states that he took his Trulicity but denies taking any other medications today.  Patient states that he did eat today and denies any infectious symptoms.  States he was normal when he  awoke today.  Patient did have recurrent episodes of hypoglycemia requiring additional dextrose given here in the emergency department given the need for close glucose monitoring and dextrose infusion to maintain the patient's blood sugar ICU admission was recommended.  CBC with baseline anemia but otherwise unremarkable.  Metabolic panel with known hyperglycemia but otherwise unremarkable.  Lipase level was normal.  Magnesium level was normal.  Chest x-ray reviewed by myself with radiology read pending shows elevated left hemidiaphragm but no acute cardiopulmonary processes noted.  This is not significantly changed from previous chest x-ray from 2022.  ICU team was contacted and the case discussed and the patient was accepted for admission.    Amount and/or Complexity of Data Reviewed  Labs: ordered. Decision-making details documented in ED Course.  Radiology: ordered. Decision-making details documented in ED Course.  ECG/medicine tests: independent interpretation performed.     Details: 1515 hrs.: Sinus rhythm with a ventricular rate of 89 bpm.  Questionable 80 ms.  QTc 496 ms.  Normal axis.  No acute ischemic injury pattern appreciated.      Labs Reviewed   CBC WITH AUTO DIFFERENTIAL - Abnormal       Result Value    WBC 6.8      nRBC 0.0      RBC 3.86 (*)     Hemoglobin 11.9 (*)     Hematocrit 35.5 (*)     MCV 92      MCH 30.8      MCHC 33.5      RDW 13.0      Platelets 192      Neutrophils % 77.6      Immature Granulocytes %, Automated 0.1      Lymphocytes % 12.3      Monocytes % 7.1      Eosinophils % 2.5      Basophils % 0.4      Neutrophils Absolute 5.25      Immature Granulocytes Absolute, Automated 0.01      Lymphocytes Absolute 0.83 (*)     Monocytes Absolute 0.48      Eosinophils Absolute 0.17      Basophils Absolute 0.03     COMPREHENSIVE METABOLIC PANEL - Abnormal    Glucose 22 (*)     Sodium 139      Potassium 3.8      Chloride 108 (*)     Bicarbonate 25      Anion Gap 10      Urea Nitrogen 21       Creatinine 1.19      eGFR 72      Calcium 9.1      Albumin 3.7      Alkaline Phosphatase 76      Total Protein 6.3 (*)     AST 17      Bilirubin, Total 0.3      ALT 19     POCT GLUCOSE - Abnormal    POCT Glucose 18 (*)    POCT GLUCOSE - Abnormal    POCT Glucose 16 (*)    POCT GLUCOSE - Abnormal    POCT Glucose 63 (*)    MAGNESIUM - Normal    Magnesium 1.89     LIPASE - Normal    Lipase 22      Narrative:     Venipuncture immediately after or during the administration of Metamizole may lead to falsely low results. Testing should be performed immediately prior to Metamizole dosing.   POCT GLUCOSE - Normal    POCT Glucose 74     C-PEPTIDE   INSULIN, FREE + TOTAL   C-PEPTIDE   PROINSULIN,INTACT   HEMOGLOBIN A1C   POCT GLUCOSE METER   POCT GLUCOSE METER   POCT GLUCOSE METER   POCT GLUCOSE METER   POCT GLUCOSE METER   POCT GLUCOSE METER   POCT GLUCOSE METER   POCT GLUCOSE METER   POCT GLUCOSE METER   POCT GLUCOSE METER     XR chest 1 view    (Results Pending)         Procedure  Critical Care    Performed by: Janak Pollock DO  Authorized by: Janak Pollock DO    Critical care provider statement:     Critical care time (minutes):  36    Critical care time was exclusive of:  Separately billable procedures and treating other patients    Critical care was necessary to treat or prevent imminent or life-threatening deterioration of the following conditions:  Metabolic crisis (Recurrent hypoglycemia)    Critical care was time spent personally by me on the following activities:  Discussions with consultants, examination of patient, evaluation of patient's response to treatment, re-evaluation of patient's condition, pulse oximetry, ordering and review of radiographic studies and ordering and review of laboratory studies    Care discussed with: admitting provider         Janak Pollock DO  09/01/24 0860

## 2024-09-01 NOTE — CARE PLAN
The patient's goals for the shift include      The clinical goals for the shift include Normal blood sugar      Problem: Pain - Adult  Goal: Verbalizes/displays adequate comfort level or baseline comfort level  Outcome: Progressing     Problem: Safety - Adult  Goal: Free from fall injury  Outcome: Progressing     Problem: Discharge Planning  Goal: Discharge to home or other facility with appropriate resources  Outcome: Progressing     Problem: Chronic Conditions and Co-morbidities  Goal: Patient's chronic conditions and co-morbidity symptoms are monitored and maintained or improved  Outcome: Progressing     Problem: Diabetes  Goal: Achieve decreasing blood glucose levels by end of shift  Outcome: Progressing  Goal: Increase stability of blood glucose readings by end of shift  Outcome: Progressing  Goal: Decrease in ketones present in urine by end of shift  Outcome: Progressing  Goal: Maintain electrolyte levels within acceptable range throughout shift  Outcome: Progressing  Goal: Maintain glucose levels >70mg/dl to <250mg/dl throughout shift  Outcome: Progressing  Goal: No changes in neurological exam by end of shift  Outcome: Progressing  Goal: Learn about and adhere to nutrition recommendations by end of shift  Outcome: Progressing  Goal: Vital signs within normal range for age by end of shift  Outcome: Progressing  Goal: Increase self care and/or family involovement by end of shift  Outcome: Progressing  Goal: Receive DSME education by end of shift  Outcome: Progressing     Problem: Diabetes  Goal: Achieve decreasing blood glucose levels by end of shift  Outcome: Progressing  Goal: Increase stability of blood glucose readings by end of shift  Outcome: Progressing  Goal: Decrease in ketones present in urine by end of shift  Outcome: Progressing  Goal: Maintain electrolyte levels within acceptable range throughout shift  Outcome: Progressing  Goal: Maintain glucose levels >70mg/dl to <250mg/dl throughout  shift  Outcome: Progressing  Goal: No changes in neurological exam by end of shift  Outcome: Progressing  Goal: Learn about and adhere to nutrition recommendations by end of shift  Outcome: Progressing  Goal: Vital signs within normal range for age by end of shift  Outcome: Progressing  Goal: Increase self care and/or family involovement by end of shift  Outcome: Progressing  Goal: Receive DSME education by end of shift  Outcome: Progressing

## 2024-09-02 ENCOUNTER — HOSPITAL ENCOUNTER (OUTPATIENT)
Dept: CARDIOLOGY | Facility: HOSPITAL | Age: 54
Discharge: HOME | DRG: 639 | End: 2024-09-02
Payer: MEDICARE

## 2024-09-02 VITALS
HEIGHT: 67 IN | WEIGHT: 187.83 LBS | SYSTOLIC BLOOD PRESSURE: 117 MMHG | HEART RATE: 88 BPM | RESPIRATION RATE: 20 BRPM | DIASTOLIC BLOOD PRESSURE: 60 MMHG | BODY MASS INDEX: 29.48 KG/M2 | OXYGEN SATURATION: 99 % | TEMPERATURE: 97.7 F

## 2024-09-02 PROBLEM — E16.2 HYPOGLYCEMIA: Status: RESOLVED | Noted: 2024-01-07 | Resolved: 2024-09-02

## 2024-09-02 LAB
ANION GAP SERPL CALC-SCNC: 10 MMOL/L (ref 10–20)
BASOPHILS # BLD AUTO: 0.01 X10*3/UL (ref 0–0.1)
BASOPHILS NFR BLD AUTO: 0.2 %
BUN SERPL-MCNC: 17 MG/DL (ref 6–23)
C PEPTIDE SERPL-MCNC: 0.3 NG/ML (ref 0.7–3.9)
C PEPTIDE SERPL-MCNC: 0.6 NG/ML (ref 0.7–3.9)
CALCIUM SERPL-MCNC: 8.5 MG/DL (ref 8.6–10.3)
CHLORIDE SERPL-SCNC: 106 MMOL/L (ref 98–107)
CO2 SERPL-SCNC: 26 MMOL/L (ref 21–32)
CREAT SERPL-MCNC: 1.15 MG/DL (ref 0.5–1.3)
EGFRCR SERPLBLD CKD-EPI 2021: 75 ML/MIN/1.73M*2
EOSINOPHIL # BLD AUTO: 0.11 X10*3/UL (ref 0–0.7)
EOSINOPHIL NFR BLD AUTO: 1.8 %
ERYTHROCYTE [DISTWIDTH] IN BLOOD BY AUTOMATED COUNT: 12.9 % (ref 11.5–14.5)
EST. AVERAGE GLUCOSE BLD GHB EST-MCNC: 174 MG/DL
GLUCOSE BLD MANUAL STRIP-MCNC: 152 MG/DL (ref 74–99)
GLUCOSE BLD MANUAL STRIP-MCNC: 302 MG/DL (ref 74–99)
GLUCOSE SERPL-MCNC: 162 MG/DL (ref 74–99)
HBA1C MFR BLD: 7.7 %
HCT VFR BLD AUTO: 32.2 % (ref 41–52)
HGB BLD-MCNC: 10.8 G/DL (ref 13.5–17.5)
IMM GRANULOCYTES # BLD AUTO: 0.01 X10*3/UL (ref 0–0.7)
IMM GRANULOCYTES NFR BLD AUTO: 0.2 % (ref 0–0.9)
LYMPHOCYTES # BLD AUTO: 1.46 X10*3/UL (ref 1.2–4.8)
LYMPHOCYTES NFR BLD AUTO: 23.7 %
MAGNESIUM SERPL-MCNC: 1.87 MG/DL (ref 1.6–2.4)
MCH RBC QN AUTO: 30.5 PG (ref 26–34)
MCHC RBC AUTO-ENTMCNC: 33.5 G/DL (ref 32–36)
MCV RBC AUTO: 91 FL (ref 80–100)
MONOCYTES # BLD AUTO: 0.52 X10*3/UL (ref 0.1–1)
MONOCYTES NFR BLD AUTO: 8.4 %
NEUTROPHILS # BLD AUTO: 4.06 X10*3/UL (ref 1.2–7.7)
NEUTROPHILS NFR BLD AUTO: 65.7 %
NRBC BLD-RTO: 0 /100 WBCS (ref 0–0)
PHOSPHATE SERPL-MCNC: 3.6 MG/DL (ref 2.5–4.9)
PLATELET # BLD AUTO: 180 X10*3/UL (ref 150–450)
POTASSIUM SERPL-SCNC: 4.2 MMOL/L (ref 3.5–5.3)
RBC # BLD AUTO: 3.54 X10*6/UL (ref 4.5–5.9)
SODIUM SERPL-SCNC: 138 MMOL/L (ref 136–145)
WBC # BLD AUTO: 6.2 X10*3/UL (ref 4.4–11.3)

## 2024-09-02 PROCEDURE — 82947 ASSAY GLUCOSE BLOOD QUANT: CPT

## 2024-09-02 PROCEDURE — 2500000001 HC RX 250 WO HCPCS SELF ADMINISTERED DRUGS (ALT 637 FOR MEDICARE OP)

## 2024-09-02 PROCEDURE — G0378 HOSPITAL OBSERVATION PER HR: HCPCS

## 2024-09-02 PROCEDURE — 93005 ELECTROCARDIOGRAM TRACING: CPT

## 2024-09-02 PROCEDURE — 83735 ASSAY OF MAGNESIUM: CPT

## 2024-09-02 PROCEDURE — 80048 BASIC METABOLIC PNL TOTAL CA: CPT

## 2024-09-02 PROCEDURE — 84206 ASSAY OF PROINSULIN: CPT

## 2024-09-02 PROCEDURE — 84100 ASSAY OF PHOSPHORUS: CPT

## 2024-09-02 PROCEDURE — 99238 HOSP IP/OBS DSCHRG MGMT 30/<: CPT

## 2024-09-02 PROCEDURE — 85025 COMPLETE CBC W/AUTO DIFF WBC: CPT

## 2024-09-02 PROCEDURE — 36415 COLL VENOUS BLD VENIPUNCTURE: CPT

## 2024-09-02 RX ADMIN — BUSPIRONE HYDROCHLORIDE 10 MG: 5 TABLET ORAL at 08:43

## 2024-09-02 RX ADMIN — FLUOXETINE HYDROCHLORIDE 20 MG: 20 CAPSULE ORAL at 08:44

## 2024-09-02 RX ADMIN — FLUTICASONE FUROATE AND VILANTEROL TRIFENATATE 1 PUFF: 100; 25 POWDER RESPIRATORY (INHALATION) at 08:43

## 2024-09-02 ASSESSMENT — COGNITIVE AND FUNCTIONAL STATUS - GENERAL
MOBILITY SCORE: 24
DAILY ACTIVITIY SCORE: 24

## 2024-09-02 ASSESSMENT — PAIN SCALES - GENERAL
PAINLEVEL_OUTOF10: 0 - NO PAIN
PAINLEVEL_OUTOF10: 0 - NO PAIN

## 2024-09-02 ASSESSMENT — PAIN - FUNCTIONAL ASSESSMENT
PAIN_FUNCTIONAL_ASSESSMENT: 0-10
PAIN_FUNCTIONAL_ASSESSMENT: 0-10

## 2024-09-02 NOTE — DISCHARGE SUMMARY
Discharge Diagnosis  DM2 with hyperglycemia  History of anxiety/depression  History of bipolar disorder  History of HTN/HLD      Issues Requiring Follow-Up  Continue home Trulicity and pioglitazone  Hold home insulin injections with meals  Hold home sliding scale  Dr. Murphy will call you for follow-up instructions    Test Results Pending At Discharge  Pending Labs       Order Current Status    Insulin, free In process    Proinsulin In process            Hospital Course  Carlyle Houser is a 54 y.o. year old male patient with Past Medical History of DM2, prior osteomyelitis of left foot, LOYDA, asthma, polyneuropathy, Charcot deformity, diabetic retinopathy, HLD, HTN, anxiety/depression, bipolar disorder.  Patient presented to [facility] emergency department 9/1 with complaints of persistent hypoglycemia.  At home patient takes Trulicity, lispro with meals, pioglitazone.  He follows with Dr. Murphy.  He states that his Trulicity dosing was recently increased, his last dose of Trulicity was Saturday 8/31.  He denies any other medication changes.  He has not been taking his meal coverage insulin because he states his glucoses at home have been running as low as 40.  He was asymptomatic at that time.  However his glucose today at home was 20 and he was diaphoretic, lethargic and not feeling well therefore he sought treatment emergency department.  Denies any fevers or recent illnesses.  Denies any chest pain or shortness of breath.  Denies any nausea or vomiting.  Has been eating well and staying hydrated.     In ED patient afebrile, normotensive, hemodynamically stable.  On room air.  Glucose for EMS 26, given D50, on arrival glucose of 74.  Glucose then dropped as low as 18 and patient was very symptomatic, given additional amp of D50.  He was then initiated on D10 infusion at 75 mL/h.  Laboratory workup largely unremarkable. Admitted to ICU for close monitoring.        Interval ICU Events:  9/1: Admitted to ICU for persistent  hypoglycemia. Likely needs medication adjustments by endocrinology as been hypoglycemic at home for some time. Hemodynamically stable. Initiated on D10 at 100 ml/hr.    9/2: Glucose stabilized overnight.  Was transitioned off D10 infusion.  Initiated on diet.  Glucoses have been anywhere from 1 's.  Discussed with jovani Peña for discharge home to continue Trulicity and pioglitazone. Hold home Lispro injections and sliding scale insulin. Dr. Murphy will call for follow up instructions.     Pertinent Physical Exam At Time of Discharge  Physical Exam    Home Medications     Medication List      CONTINUE taking these medications     albuterol 90 mcg/actuation inhaler   atorvastatin 20 mg tablet; Commonly known as: Lipitor   benztropine 0.5 mg tablet; Commonly known as: Cogentin   budesonide-formoteroL 80-4.5 mcg/actuation inhaler; Commonly known as:   Symbicort   busPIRone 10 mg tablet; Commonly known as: Buspar   FLUoxetine 20 mg capsule; Commonly known as: PROzac   pioglitazone 30 mg tablet; Commonly known as: Actos   Trulicity 3 mg/0.5 mL pen injector; Generic drug: dulaglutide   ziprasidone 80 mg capsule; Commonly known as: Geodon     STOP taking these medications     insulin lispro 100 unit/mL injection; Commonly known as: HumaLOG       Outpatient Follow-Up  No future appointments.    ALESIA John-CNP

## 2024-09-02 NOTE — CARE PLAN
The patient's goals for the shift include  for blood sugars to be within normal ranges throughout the duration of the shift.    The clinical goals for the shift include Patient will have stable blood sugar levels throughout the shift

## 2024-09-02 NOTE — PROGRESS NOTES
09/02/24 0935   Discharge Planning   Living Arrangements Spouse/significant other   Support Systems Spouse/significant other   Assistance Needed PTA - none   Type of Residence Private residence   Number of Stairs to Enter Residence 0   Number of Stairs Within Residence 0   Do you have animals or pets at home? No   Home or Post Acute Services None   Expected Discharge Disposition Home   Does the patient need discharge transport arranged? No     HOME no needs. Will follow up with Endocrinologist outpatient.

## 2024-09-03 LAB
ATRIAL RATE: 89 BPM
P AXIS: 50 DEGREES
P OFFSET: 195 MS
P ONSET: 147 MS
PR INTERVAL: 132 MS
Q ONSET: 213 MS
QRS COUNT: 15 BEATS
QRS DURATION: 80 MS
QT INTERVAL: 408 MS
QTC CALCULATION(BAZETT): 496 MS
QTC FREDERICIA: 465 MS
R AXIS: 28 DEGREES
T AXIS: 52 DEGREES
T OFFSET: 417 MS
VENTRICULAR RATE: 89 BPM

## 2024-09-04 LAB
INSULIN FREE SERPL-ACNC: 66 UIU/ML (ref 3–25)
INSULIN SERPL-ACNC: 87 UIU/ML (ref 3–25)

## 2024-09-05 LAB — PROINSULIN P 12H FAST SERPL-SCNC: 4.4 PMOL/L

## 2024-10-24 ENCOUNTER — HOSPITAL ENCOUNTER (OUTPATIENT)
Facility: HOSPITAL | Age: 54
Setting detail: OBSERVATION
Discharge: HOME | End: 2024-10-26
Attending: INTERNAL MEDICINE | Admitting: NURSE PRACTITIONER
Payer: MEDICARE

## 2024-10-24 ENCOUNTER — APPOINTMENT (OUTPATIENT)
Dept: CARDIOLOGY | Facility: HOSPITAL | Age: 54
End: 2024-10-24
Payer: MEDICARE

## 2024-10-24 DIAGNOSIS — E16.2 HYPOGLYCEMIA: Primary | ICD-10-CM

## 2024-10-24 LAB
ALBUMIN SERPL BCP-MCNC: 3.3 G/DL (ref 3.4–5)
ALP SERPL-CCNC: 69 U/L (ref 33–120)
ALT SERPL W P-5'-P-CCNC: 13 U/L (ref 10–52)
ANION GAP SERPL CALC-SCNC: 9 MMOL/L (ref 10–20)
AST SERPL W P-5'-P-CCNC: 13 U/L (ref 9–39)
BASOPHILS # BLD AUTO: 0.03 X10*3/UL (ref 0–0.1)
BASOPHILS NFR BLD AUTO: 0.4 %
BILIRUB SERPL-MCNC: 0.3 MG/DL (ref 0–1.2)
BNP SERPL-MCNC: 28 PG/ML (ref 0–99)
BUN SERPL-MCNC: 22 MG/DL (ref 6–23)
CALCIUM SERPL-MCNC: 8.2 MG/DL (ref 8.6–10.3)
CARDIAC TROPONIN I PNL SERPL HS: 10 NG/L (ref 0–20)
CHLORIDE SERPL-SCNC: 106 MMOL/L (ref 98–107)
CO2 SERPL-SCNC: 24 MMOL/L (ref 21–32)
CREAT SERPL-MCNC: 1.12 MG/DL (ref 0.5–1.3)
EGFRCR SERPLBLD CKD-EPI 2021: 78 ML/MIN/1.73M*2
EOSINOPHIL # BLD AUTO: 0.07 X10*3/UL (ref 0–0.7)
EOSINOPHIL NFR BLD AUTO: 0.8 %
ERYTHROCYTE [DISTWIDTH] IN BLOOD BY AUTOMATED COUNT: 12.8 % (ref 11.5–14.5)
FLUAV RNA RESP QL NAA+PROBE: NOT DETECTED
FLUBV RNA RESP QL NAA+PROBE: NOT DETECTED
GLUCOSE BLD MANUAL STRIP-MCNC: 132 MG/DL (ref 74–99)
GLUCOSE BLD MANUAL STRIP-MCNC: 41 MG/DL (ref 74–99)
GLUCOSE BLD MANUAL STRIP-MCNC: 52 MG/DL (ref 74–99)
GLUCOSE SERPL-MCNC: 208 MG/DL (ref 74–99)
HCT VFR BLD AUTO: 32.7 % (ref 41–52)
HGB BLD-MCNC: 11 G/DL (ref 13.5–17.5)
IMM GRANULOCYTES # BLD AUTO: 0.02 X10*3/UL (ref 0–0.7)
IMM GRANULOCYTES NFR BLD AUTO: 0.2 % (ref 0–0.9)
LACTATE SERPL-SCNC: 1.7 MMOL/L (ref 0.4–2)
LIPASE SERPL-CCNC: 18 U/L (ref 9–82)
LYMPHOCYTES # BLD AUTO: 0.58 X10*3/UL (ref 1.2–4.8)
LYMPHOCYTES NFR BLD AUTO: 6.8 %
MAGNESIUM SERPL-MCNC: 1.52 MG/DL (ref 1.6–2.4)
MCH RBC QN AUTO: 30.4 PG (ref 26–34)
MCHC RBC AUTO-ENTMCNC: 33.6 G/DL (ref 32–36)
MCV RBC AUTO: 90 FL (ref 80–100)
MONOCYTES # BLD AUTO: 0.66 X10*3/UL (ref 0.1–1)
MONOCYTES NFR BLD AUTO: 7.7 %
NEUTROPHILS # BLD AUTO: 7.16 X10*3/UL (ref 1.2–7.7)
NEUTROPHILS NFR BLD AUTO: 84.1 %
NRBC BLD-RTO: 0 /100 WBCS (ref 0–0)
PLATELET # BLD AUTO: 192 X10*3/UL (ref 150–450)
POTASSIUM SERPL-SCNC: 3.4 MMOL/L (ref 3.5–5.3)
PROT SERPL-MCNC: 5.6 G/DL (ref 6.4–8.2)
RBC # BLD AUTO: 3.62 X10*6/UL (ref 4.5–5.9)
SARS-COV-2 RNA RESP QL NAA+PROBE: NOT DETECTED
SODIUM SERPL-SCNC: 136 MMOL/L (ref 136–145)
WBC # BLD AUTO: 8.5 X10*3/UL (ref 4.4–11.3)

## 2024-10-24 PROCEDURE — 83605 ASSAY OF LACTIC ACID: CPT | Performed by: INTERNAL MEDICINE

## 2024-10-24 PROCEDURE — 99291 CRITICAL CARE FIRST HOUR: CPT | Performed by: INTERNAL MEDICINE

## 2024-10-24 PROCEDURE — 84484 ASSAY OF TROPONIN QUANT: CPT | Performed by: INTERNAL MEDICINE

## 2024-10-24 PROCEDURE — 93005 ELECTROCARDIOGRAM TRACING: CPT

## 2024-10-24 PROCEDURE — 83880 ASSAY OF NATRIURETIC PEPTIDE: CPT | Performed by: INTERNAL MEDICINE

## 2024-10-24 PROCEDURE — 87636 SARSCOV2 & INF A&B AMP PRB: CPT | Performed by: INTERNAL MEDICINE

## 2024-10-24 PROCEDURE — 85025 COMPLETE CBC W/AUTO DIFF WBC: CPT | Performed by: INTERNAL MEDICINE

## 2024-10-24 PROCEDURE — 2500000005 HC RX 250 GENERAL PHARMACY W/O HCPCS: Performed by: INTERNAL MEDICINE

## 2024-10-24 PROCEDURE — 80053 COMPREHEN METABOLIC PANEL: CPT | Performed by: INTERNAL MEDICINE

## 2024-10-24 PROCEDURE — 2500000004 HC RX 250 GENERAL PHARMACY W/ HCPCS (ALT 636 FOR OP/ED): Performed by: INTERNAL MEDICINE

## 2024-10-24 PROCEDURE — 82947 ASSAY GLUCOSE BLOOD QUANT: CPT

## 2024-10-24 PROCEDURE — 36415 COLL VENOUS BLD VENIPUNCTURE: CPT | Performed by: INTERNAL MEDICINE

## 2024-10-24 PROCEDURE — 83735 ASSAY OF MAGNESIUM: CPT | Performed by: INTERNAL MEDICINE

## 2024-10-24 PROCEDURE — 83690 ASSAY OF LIPASE: CPT | Performed by: INTERNAL MEDICINE

## 2024-10-24 RX ORDER — POLYETHYLENE GLYCOL 3350 17 G/17G
17 POWDER, FOR SOLUTION ORAL DAILY PRN
Status: DISCONTINUED | OUTPATIENT
Start: 2024-10-24 | End: 2024-10-26 | Stop reason: HOSPADM

## 2024-10-24 RX ORDER — ACETAMINOPHEN 650 MG/1
650 SUPPOSITORY RECTAL EVERY 4 HOURS PRN
Status: DISCONTINUED | OUTPATIENT
Start: 2024-10-24 | End: 2024-10-26 | Stop reason: HOSPADM

## 2024-10-24 RX ORDER — ACETAMINOPHEN 500 MG
10 TABLET ORAL NIGHTLY PRN
Status: DISCONTINUED | OUTPATIENT
Start: 2024-10-24 | End: 2024-10-26 | Stop reason: HOSPADM

## 2024-10-24 RX ORDER — DEXTROSE 50 % IN WATER (D50W) INTRAVENOUS SYRINGE
25
Status: DISCONTINUED | OUTPATIENT
Start: 2024-10-24 | End: 2024-10-26 | Stop reason: HOSPADM

## 2024-10-24 RX ORDER — DEXTROSE 50 % IN WATER (D50W) INTRAVENOUS SYRINGE
12.5
Status: DISCONTINUED | OUTPATIENT
Start: 2024-10-24 | End: 2024-10-26 | Stop reason: HOSPADM

## 2024-10-24 RX ORDER — ACETAMINOPHEN 325 MG/1
650 TABLET ORAL EVERY 4 HOURS PRN
Status: DISCONTINUED | OUTPATIENT
Start: 2024-10-24 | End: 2024-10-26 | Stop reason: HOSPADM

## 2024-10-24 RX ORDER — DEXTROSE MONOHYDRATE 100 MG/ML
100 INJECTION, SOLUTION INTRAVENOUS CONTINUOUS
Status: DISCONTINUED | OUTPATIENT
Start: 2024-10-24 | End: 2024-10-25

## 2024-10-24 RX ORDER — ACETAMINOPHEN 160 MG/5ML
650 SOLUTION ORAL EVERY 4 HOURS PRN
Status: DISCONTINUED | OUTPATIENT
Start: 2024-10-24 | End: 2024-10-26 | Stop reason: HOSPADM

## 2024-10-24 RX ADMIN — DEXTROSE MONOHYDRATE 100 ML/HR: 100 INJECTION, SOLUTION INTRAVENOUS at 22:52

## 2024-10-24 RX ADMIN — DEXTROSE MONOHYDRATE 25 G: 25 INJECTION, SOLUTION INTRAVENOUS at 21:57

## 2024-10-24 RX ADMIN — DEXTROSE MONOHYDRATE 25 G: 25 INJECTION, SOLUTION INTRAVENOUS at 22:52

## 2024-10-24 ASSESSMENT — PAIN SCALES - GENERAL: PAINLEVEL_OUTOF10: 0 - NO PAIN

## 2024-10-24 ASSESSMENT — LIFESTYLE VARIABLES
HAVE YOU EVER FELT YOU SHOULD CUT DOWN ON YOUR DRINKING: NO
EVER HAD A DRINK FIRST THING IN THE MORNING TO STEADY YOUR NERVES TO GET RID OF A HANGOVER: NO
TOTAL SCORE: 0
HAVE PEOPLE ANNOYED YOU BY CRITICIZING YOUR DRINKING: NO
EVER FELT BAD OR GUILTY ABOUT YOUR DRINKING: NO

## 2024-10-24 ASSESSMENT — PAIN - FUNCTIONAL ASSESSMENT: PAIN_FUNCTIONAL_ASSESSMENT: 0-10

## 2024-10-25 LAB
APPEARANCE UR: CLEAR
ATRIAL RATE: 94 BPM
BILIRUB UR STRIP.AUTO-MCNC: NEGATIVE MG/DL
CARDIAC TROPONIN I PNL SERPL HS: 18 NG/L (ref 0–20)
CARDIAC TROPONIN I PNL SERPL HS: 31 NG/L (ref 0–20)
COLOR UR: COLORLESS
GLUCOSE BLD MANUAL STRIP-MCNC: 114 MG/DL (ref 74–99)
GLUCOSE BLD MANUAL STRIP-MCNC: 193 MG/DL (ref 74–99)
GLUCOSE BLD MANUAL STRIP-MCNC: 204 MG/DL (ref 74–99)
GLUCOSE BLD MANUAL STRIP-MCNC: 231 MG/DL (ref 74–99)
GLUCOSE BLD MANUAL STRIP-MCNC: 264 MG/DL (ref 74–99)
GLUCOSE BLD MANUAL STRIP-MCNC: 326 MG/DL (ref 74–99)
GLUCOSE BLD MANUAL STRIP-MCNC: 407 MG/DL (ref 74–99)
GLUCOSE BLD MANUAL STRIP-MCNC: 64 MG/DL (ref 74–99)
GLUCOSE UR STRIP.AUTO-MCNC: ABNORMAL MG/DL
HOLD SPECIMEN: NORMAL
KETONES UR STRIP.AUTO-MCNC: NEGATIVE MG/DL
LEUKOCYTE ESTERASE UR QL STRIP.AUTO: NEGATIVE
MAGNESIUM SERPL-MCNC: 1.64 MG/DL (ref 1.6–2.4)
NITRITE UR QL STRIP.AUTO: NEGATIVE
P AXIS: 67 DEGREES
P OFFSET: 196 MS
P ONSET: 132 MS
PH UR STRIP.AUTO: 6 [PH]
PR INTERVAL: 160 MS
PROT UR STRIP.AUTO-MCNC: NEGATIVE MG/DL
Q ONSET: 212 MS
QRS COUNT: 16 BEATS
QRS DURATION: 94 MS
QT INTERVAL: 420 MS
QTC CALCULATION(BAZETT): 525 MS
QTC FREDERICIA: 487 MS
R AXIS: 35 DEGREES
RBC # UR STRIP.AUTO: NEGATIVE /UL
SP GR UR STRIP.AUTO: 1.01
T AXIS: 65 DEGREES
T OFFSET: 422 MS
UROBILINOGEN UR STRIP.AUTO-MCNC: NORMAL MG/DL
VENTRICULAR RATE: 94 BPM

## 2024-10-25 PROCEDURE — 84484 ASSAY OF TROPONIN QUANT: CPT | Performed by: INTERNAL MEDICINE

## 2024-10-25 PROCEDURE — 36415 COLL VENOUS BLD VENIPUNCTURE: CPT | Performed by: INTERNAL MEDICINE

## 2024-10-25 PROCEDURE — 36415 COLL VENOUS BLD VENIPUNCTURE: CPT | Performed by: STUDENT IN AN ORGANIZED HEALTH CARE EDUCATION/TRAINING PROGRAM

## 2024-10-25 PROCEDURE — 82947 ASSAY GLUCOSE BLOOD QUANT: CPT | Mod: 59

## 2024-10-25 PROCEDURE — 2500000004 HC RX 250 GENERAL PHARMACY W/ HCPCS (ALT 636 FOR OP/ED): Performed by: INTERNAL MEDICINE

## 2024-10-25 PROCEDURE — 96361 HYDRATE IV INFUSION ADD-ON: CPT | Mod: 59

## 2024-10-25 PROCEDURE — G0378 HOSPITAL OBSERVATION PER HR: HCPCS

## 2024-10-25 PROCEDURE — 96366 THER/PROPH/DIAG IV INF ADDON: CPT | Mod: 59

## 2024-10-25 PROCEDURE — 81003 URINALYSIS AUTO W/O SCOPE: CPT | Performed by: INTERNAL MEDICINE

## 2024-10-25 PROCEDURE — 96365 THER/PROPH/DIAG IV INF INIT: CPT | Mod: 59

## 2024-10-25 PROCEDURE — 2500000002 HC RX 250 W HCPCS SELF ADMINISTERED DRUGS (ALT 637 FOR MEDICARE OP, ALT 636 FOR OP/ED): Performed by: STUDENT IN AN ORGANIZED HEALTH CARE EDUCATION/TRAINING PROGRAM

## 2024-10-25 PROCEDURE — 84484 ASSAY OF TROPONIN QUANT: CPT | Performed by: STUDENT IN AN ORGANIZED HEALTH CARE EDUCATION/TRAINING PROGRAM

## 2024-10-25 PROCEDURE — 83735 ASSAY OF MAGNESIUM: CPT | Performed by: STUDENT IN AN ORGANIZED HEALTH CARE EDUCATION/TRAINING PROGRAM

## 2024-10-25 PROCEDURE — 2500000001 HC RX 250 WO HCPCS SELF ADMINISTERED DRUGS (ALT 637 FOR MEDICARE OP): Performed by: STUDENT IN AN ORGANIZED HEALTH CARE EDUCATION/TRAINING PROGRAM

## 2024-10-25 PROCEDURE — 2500000004 HC RX 250 GENERAL PHARMACY W/ HCPCS (ALT 636 FOR OP/ED): Performed by: STUDENT IN AN ORGANIZED HEALTH CARE EDUCATION/TRAINING PROGRAM

## 2024-10-25 PROCEDURE — 99222 1ST HOSP IP/OBS MODERATE 55: CPT | Performed by: NURSE PRACTITIONER

## 2024-10-25 RX ORDER — FLUTICASONE FUROATE AND VILANTEROL 100; 25 UG/1; UG/1
1 POWDER RESPIRATORY (INHALATION)
Status: DISCONTINUED | OUTPATIENT
Start: 2024-10-25 | End: 2024-10-26 | Stop reason: HOSPADM

## 2024-10-25 RX ORDER — PIOGLITAZONEHYDROCHLORIDE 30 MG/1
30 TABLET ORAL DAILY
Status: DISCONTINUED | OUTPATIENT
Start: 2024-10-25 | End: 2024-10-26 | Stop reason: HOSPADM

## 2024-10-25 RX ORDER — ZIPRASIDONE HYDROCHLORIDE 40 MG/1
160 CAPSULE ORAL
Status: DISCONTINUED | OUTPATIENT
Start: 2024-10-25 | End: 2024-10-26 | Stop reason: HOSPADM

## 2024-10-25 RX ORDER — BUSPIRONE HYDROCHLORIDE 5 MG/1
10 TABLET ORAL 2 TIMES DAILY
Status: DISCONTINUED | OUTPATIENT
Start: 2024-10-25 | End: 2024-10-26 | Stop reason: HOSPADM

## 2024-10-25 RX ORDER — ALBUTEROL SULFATE 90 UG/1
2 INHALANT RESPIRATORY (INHALATION) EVERY 6 HOURS PRN
Status: DISCONTINUED | OUTPATIENT
Start: 2024-10-25 | End: 2024-10-26 | Stop reason: HOSPADM

## 2024-10-25 RX ORDER — ATORVASTATIN CALCIUM 10 MG/1
10 TABLET, FILM COATED ORAL NIGHTLY
Status: DISCONTINUED | OUTPATIENT
Start: 2024-10-25 | End: 2024-10-26 | Stop reason: HOSPADM

## 2024-10-25 RX ORDER — MAGNESIUM SULFATE HEPTAHYDRATE 40 MG/ML
2 INJECTION, SOLUTION INTRAVENOUS ONCE
Status: COMPLETED | OUTPATIENT
Start: 2024-10-25 | End: 2024-10-25

## 2024-10-25 RX ORDER — INSULIN LISPRO 100 [IU]/ML
0-10 INJECTION, SOLUTION INTRAVENOUS; SUBCUTANEOUS
Status: DISCONTINUED | OUTPATIENT
Start: 2024-10-25 | End: 2024-10-26 | Stop reason: HOSPADM

## 2024-10-25 RX ORDER — FLUOXETINE HYDROCHLORIDE 20 MG/1
20 CAPSULE ORAL DAILY
Status: DISCONTINUED | OUTPATIENT
Start: 2024-10-25 | End: 2024-10-26 | Stop reason: HOSPADM

## 2024-10-25 RX ORDER — BENZTROPINE MESYLATE 1 MG/1
0.5 TABLET ORAL 2 TIMES DAILY
Status: DISCONTINUED | OUTPATIENT
Start: 2024-10-25 | End: 2024-10-26 | Stop reason: HOSPADM

## 2024-10-25 RX ADMIN — BENZTROPINE MESYLATE 0.5 MG: 1 TABLET ORAL at 20:01

## 2024-10-25 RX ADMIN — INSULIN LISPRO 10 UNITS: 100 INJECTION, SOLUTION INTRAVENOUS; SUBCUTANEOUS at 17:16

## 2024-10-25 RX ADMIN — MAGNESIUM SULFATE HEPTAHYDRATE 2 G: 40 INJECTION, SOLUTION INTRAVENOUS at 11:29

## 2024-10-25 RX ADMIN — FLUTICASONE FUROATE AND VILANTEROL TRIFENATATE 1 PUFF: 100; 25 POWDER RESPIRATORY (INHALATION) at 18:06

## 2024-10-25 RX ADMIN — ATORVASTATIN CALCIUM 10 MG: 10 TABLET ORAL at 20:02

## 2024-10-25 RX ADMIN — DEXTROSE MONOHYDRATE 100 ML/HR: 100 INJECTION, SOLUTION INTRAVENOUS at 02:12

## 2024-10-25 RX ADMIN — BUSPIRONE HYDROCHLORIDE 10 MG: 5 TABLET ORAL at 20:02

## 2024-10-25 RX ADMIN — ZIPRASIDONE HYDROCHLORIDE 160 MG: 40 CAPSULE ORAL at 18:06

## 2024-10-25 RX ADMIN — FLUOXETINE HYDROCHLORIDE 20 MG: 20 CAPSULE ORAL at 17:16

## 2024-10-25 SDOH — ECONOMIC STABILITY: FOOD INSECURITY: WITHIN THE PAST 12 MONTHS, YOU WORRIED THAT YOUR FOOD WOULD RUN OUT BEFORE YOU GOT THE MONEY TO BUY MORE.: NEVER TRUE

## 2024-10-25 SDOH — SOCIAL STABILITY: SOCIAL INSECURITY: HAVE YOU HAD THOUGHTS OF HARMING ANYONE ELSE?: NO

## 2024-10-25 SDOH — ECONOMIC STABILITY: FOOD INSECURITY: WITHIN THE PAST 12 MONTHS, THE FOOD YOU BOUGHT JUST DIDN'T LAST AND YOU DIDN'T HAVE MONEY TO GET MORE.: NEVER TRUE

## 2024-10-25 SDOH — SOCIAL STABILITY: SOCIAL INSECURITY: DO YOU FEEL ANYONE HAS EXPLOITED OR TAKEN ADVANTAGE OF YOU FINANCIALLY OR OF YOUR PERSONAL PROPERTY?: NO

## 2024-10-25 SDOH — ECONOMIC STABILITY: INCOME INSECURITY: IN THE PAST 12 MONTHS HAS THE ELECTRIC, GAS, OIL, OR WATER COMPANY THREATENED TO SHUT OFF SERVICES IN YOUR HOME?: NO

## 2024-10-25 SDOH — SOCIAL STABILITY: SOCIAL INSECURITY
WITHIN THE LAST YEAR, HAVE YOU BEEN RAPED OR FORCED TO HAVE ANY KIND OF SEXUAL ACTIVITY BY YOUR PARTNER OR EX-PARTNER?: NO

## 2024-10-25 SDOH — SOCIAL STABILITY: SOCIAL INSECURITY: WITHIN THE LAST YEAR, HAVE YOU BEEN HUMILIATED OR EMOTIONALLY ABUSED IN OTHER WAYS BY YOUR PARTNER OR EX-PARTNER?: NO

## 2024-10-25 SDOH — SOCIAL STABILITY: SOCIAL INSECURITY: DOES ANYONE TRY TO KEEP YOU FROM HAVING/CONTACTING OTHER FRIENDS OR DOING THINGS OUTSIDE YOUR HOME?: NO

## 2024-10-25 SDOH — SOCIAL STABILITY: SOCIAL INSECURITY: ARE YOU OR HAVE YOU BEEN THREATENED OR ABUSED PHYSICALLY, EMOTIONALLY, OR SEXUALLY BY ANYONE?: NO

## 2024-10-25 SDOH — SOCIAL STABILITY: SOCIAL INSECURITY: DO YOU FEEL UNSAFE GOING BACK TO THE PLACE WHERE YOU ARE LIVING?: NO

## 2024-10-25 SDOH — SOCIAL STABILITY: SOCIAL INSECURITY: HAS ANYONE EVER THREATENED TO HURT YOUR FAMILY OR YOUR PETS?: NO

## 2024-10-25 SDOH — SOCIAL STABILITY: SOCIAL INSECURITY: ABUSE: ADULT

## 2024-10-25 SDOH — SOCIAL STABILITY: SOCIAL INSECURITY: WITHIN THE LAST YEAR, HAVE YOU BEEN AFRAID OF YOUR PARTNER OR EX-PARTNER?: NO

## 2024-10-25 SDOH — SOCIAL STABILITY: SOCIAL INSECURITY: ARE THERE ANY APPARENT SIGNS OF INJURIES/BEHAVIORS THAT COULD BE RELATED TO ABUSE/NEGLECT?: NO

## 2024-10-25 ASSESSMENT — COGNITIVE AND FUNCTIONAL STATUS - GENERAL
PATIENT BASELINE BEDBOUND: NO
MOBILITY SCORE: 24
DAILY ACTIVITIY SCORE: 24
DAILY ACTIVITIY SCORE: 24
MOBILITY SCORE: 24

## 2024-10-25 ASSESSMENT — ACTIVITIES OF DAILY LIVING (ADL)
TOILETING: INDEPENDENT
WALKS IN HOME: INDEPENDENT
PATIENT'S MEMORY ADEQUATE TO SAFELY COMPLETE DAILY ACTIVITIES?: YES
ADEQUATE_TO_COMPLETE_ADL: YES
FEEDING YOURSELF: INDEPENDENT
GROOMING: INDEPENDENT
JUDGMENT_ADEQUATE_SAFELY_COMPLETE_DAILY_ACTIVITIES: YES
HEARING - LEFT EAR: FUNCTIONAL
HEARING - RIGHT EAR: FUNCTIONAL
BATHING: INDEPENDENT
DRESSING YOURSELF: INDEPENDENT
LACK_OF_TRANSPORTATION: NO

## 2024-10-25 ASSESSMENT — PAIN SCALES - GENERAL
PAINLEVEL_OUTOF10: 0 - NO PAIN
PAINLEVEL_OUTOF10: 0 - NO PAIN

## 2024-10-25 ASSESSMENT — LIFESTYLE VARIABLES
SKIP TO QUESTIONS 9-10: 1
HOW OFTEN DO YOU HAVE 6 OR MORE DRINKS ON ONE OCCASION: NEVER
AUDIT-C TOTAL SCORE: 0
AUDIT-C TOTAL SCORE: 0
HOW OFTEN DO YOU HAVE A DRINK CONTAINING ALCOHOL: NEVER
HOW MANY STANDARD DRINKS CONTAINING ALCOHOL DO YOU HAVE ON A TYPICAL DAY: PATIENT DOES NOT DRINK

## 2024-10-25 ASSESSMENT — PAIN - FUNCTIONAL ASSESSMENT: PAIN_FUNCTIONAL_ASSESSMENT: 0-10

## 2024-10-25 ASSESSMENT — ENCOUNTER SYMPTOMS
DYSURIA: 0
ABDOMINAL PAIN: 0
VOMITING: 0
CHILLS: 0
NAUSEA: 0
CONSTIPATION: 0
HEMATURIA: 0
PALPITATIONS: 0
SHORTNESS OF BREATH: 0
DIARRHEA: 0
FLANK PAIN: 0
FEVER: 0
FREQUENCY: 0

## 2024-10-25 ASSESSMENT — PATIENT HEALTH QUESTIONNAIRE - PHQ9
2. FEELING DOWN, DEPRESSED OR HOPELESS: NOT AT ALL
1. LITTLE INTEREST OR PLEASURE IN DOING THINGS: NEARLY EVERY DAY
SUM OF ALL RESPONSES TO PHQ9 QUESTIONS 1 & 2: 3

## 2024-10-25 NOTE — ED NOTES
Nutrition Diet Education  Pt provided with handouts regarding hypoglycemia and meal planning with diabetes- will follow-up in outpatient setting.          Education Documentation  Nutrition Care Manual, taught by Bozena Sun, RD, LD at 10/25/2024  4:08 PM.  Learner: Patient  Readiness: Acceptance  Method: Handout  Response: Needs Reinforcement, Verbalizes Understanding  Comment: Pt has an outpatient appointment for diabetes education in 11/24.      Follow Up:     Time Spent (min): 60 minutes  Last Date of Nutrition Visit: 10/25/24  Nutrition Follow-Up Needed?: Dietitian to reassess per policy  Follow up Comment: daibetes education was completed today

## 2024-10-25 NOTE — H&P
History Of Present Illness  Carlyle Houser is a 54 y.o. male with a past medical history of HTN, HLD, anxiety, bipolar disorder, and T2DM who presented to the ED with low blood sugar. Notably, he had an ICU stay in Sept of 2024 for hypoglycemia with medication changes at that time. He denies any fever, chills, chest pain, shortness of breath, nausea, or vomiting. He reports feeling back to baseline after ED treatments.      Past Medical History  Past Medical History:   Diagnosis Date    Anxiety     Asthma ([facility])     Diabetes mellitus (Multi)     Hypertension        Surgical History  No past surgical history on file.     Social History  He reports that he has never smoked. He has never used smokeless tobacco. He reports that he does not drink alcohol and does not use drugs.    Family History  No family history on file.     Allergies  Patient has no known allergies.    Review of Systems   Constitutional:  Negative for chills and fever.   Respiratory:  Negative for shortness of breath.    Cardiovascular:  Negative for chest pain and palpitations.   Gastrointestinal:  Negative for abdominal pain, constipation, diarrhea, nausea and vomiting.   Genitourinary:  Negative for dysuria, flank pain, frequency, hematuria and urgency.   All other systems reviewed and are negative.       Physical Exam  Vitals reviewed.   HENT:      Head: Normocephalic and atraumatic.   Cardiovascular:      Rate and Rhythm: Normal rate and regular rhythm.      Heart sounds: Normal heart sounds.   Pulmonary:      Effort: Pulmonary effort is normal.      Breath sounds: Normal air entry.   Abdominal:      General: Bowel sounds are normal.      Palpations: Abdomen is soft.      Tenderness: There is no abdominal tenderness.   Musculoskeletal:         General: No deformity.   Skin:     General: Skin is warm and dry.   Neurological:      General: No focal deficit present.      Mental Status: He is alert and oriented to person, place, and time.  "  Psychiatric:         Mood and Affect: Mood normal.         Behavior: Behavior normal.          Last Recorded Vitals  Blood pressure 164/84, pulse 96, temperature 36.5 °C (97.7 °F), temperature source Temporal, resp. rate 18, height 1.676 m (5' 6\"), weight 80.3 kg (177 lb), SpO2 100%.    Relevant Results      Lab Results   Component Value Date    WBC 8.5 10/24/2024    HGB 11.0 (L) 10/24/2024    HCT 32.7 (L) 10/24/2024    MCV 90 10/24/2024     10/24/2024     Lab Results   Component Value Date    GLUCOSE 208 (H) 10/24/2024    CALCIUM 8.2 (L) 10/24/2024     10/24/2024    K 3.4 (L) 10/24/2024    CO2 24 10/24/2024     10/24/2024    BUN 22 10/24/2024    CREATININE 1.12 10/24/2024     Lab Results   Component Value Date    HGBA1C 7.7 (H) 09/01/2024     ED Medication Administration from 10/24/2024 2145 to 10/25/2024 0014         Date/Time Order Dose Route Action Action by     10/24/2024 2157 EDT dextrose 50 % injection 25 g 25 g intravenous Given TOM Caldwell     10/24/2024 2252 EDT dextrose 10 % in water (D10W) infusion 100 mL/hr intravenous New Bag SON August     10/24/2024 2252 EDT dextrose 50 % injection 25 g 25 g intravenous Given SON August     10/24/2024 2331 EDT dextrose 50 % injection 12.5 g 12.5 g intravenous Not Given SON August               Assessment/Plan   Assessment & Plan  Hypoglycemia      #T2DM  #Hypoglycemia (resolved)  -Accuchecks Q4H  -Hypoglycemic protocol  -No SSI for now    #Hypomagnesemia  #Hypokalemia  -Will give PO supplementation  -Follow             ALESIA Aguirre-CNP    "

## 2024-10-25 NOTE — ED PROVIDER NOTES
HPI   Chief Complaint   Patient presents with    Hypoglycemia     Pt arrived by squad with c/o low BG. Squad gave D10 and BG increased from 40 to 177. Back down to 100 prior to arrival. 41 on arrival to ED       Patient presented for evaluation of hypoglycemia.  Patient indicates that he has had trouble with low blood sugar today.  Patient notes he has not been feeling ill.  Denies cough.  Denies runny nose.  Denies vomiting or diarrhea.  Patient states he did take his medication this evening at 9 PM.  Patient states that he normally takes his medication at this time.  Patient states he did eat dinner.  Patient notes that his wife called EMS.  EMS indicates that the patient's glucose was in the 40s.  They provided 1 bag of D10 via IV with improvement to glucose of 170.  Glucose dropping to 110 and then to 41 again in the ED here.      History provided by:  Patient and EMS personnel          Patient History   Past Medical History:   Diagnosis Date    Anxiety     Asthma ([facility])     Diabetes mellitus (Multi)     Hypertension      No past surgical history on file.  No family history on file.  Social History     Tobacco Use    Smoking status: Never    Smokeless tobacco: Never   Vaping Use    Vaping status: Never Used   Substance Use Topics    Alcohol use: Never    Drug use: Never       Physical Exam   ED Triage Vitals [10/24/24 2154]   Temperature Heart Rate Respirations BP   36.5 °C (97.7 °F) 96 18 164/84      Pulse Ox Temp Source Heart Rate Source Patient Position   100 % Temporal Monitor Sitting      BP Location FiO2 (%)     Right arm --       Physical Exam  Vitals and nursing note reviewed.   Constitutional:       Appearance: Normal appearance.   HENT:      Head: Atraumatic.      Right Ear: External ear normal.      Left Ear: External ear normal.      Nose: Nose normal.      Mouth/Throat:      Mouth: Mucous membranes are moist.      Pharynx: Oropharynx is clear.   Eyes:      Extraocular Movements: Extraocular  movements intact.      Pupils: Pupils are equal, round, and reactive to light.   Cardiovascular:      Rate and Rhythm: Normal rate and regular rhythm.      Pulses: Normal pulses.   Pulmonary:      Effort: Pulmonary effort is normal.      Breath sounds: Normal breath sounds.   Abdominal:      Palpations: Abdomen is soft.      Tenderness: There is no abdominal tenderness.   Musculoskeletal:         General: No tenderness or signs of injury. Normal range of motion.      Cervical back: Normal range of motion and neck supple. No rigidity or tenderness.   Skin:     General: Skin is warm and dry.   Neurological:      General: No focal deficit present.      Mental Status: He is alert and oriented to person, place, and time. Mental status is at baseline.   Psychiatric:         Mood and Affect: Mood normal.         Behavior: Behavior normal.           ED Course & MDM   ED Course as of 10/25/24 0027   Thu Oct 24, 2024   2158 Glucose 41.  Entering hypoglycemia protocol including D50 and D10 infusion. [JA]   2249 Patient another episode of hypoglycemia after administration of D50 and glucose is 52 at this time.  D10 infusion has not started yet.  Nursing to start D10 infusion and additional D50 amp ordered. [JA]      ED Course User Index  [JA] Mahad Leung DO         Diagnoses as of 10/25/24 0027   Hypoglycemia                 No data recorded     [place] Coma Scale Score: 15 (10/24/24 2157 : Sheri Peña RN)                           Medical Decision Making  Differential diagnosis: Hypoglycemia, insulin overdose, infection, other    Patient presented for evaluation of hypoglycemic episode.  Patient's wife indicates he was acting funny and was slow to respond that she checked his sugar and it was in the 40s.  EMS arrived and administered D10.  Patient had improvement of glucose although it quickly came back down to 41.  Patient treated with additional D50 bolus as well as D10 infusion.  Patient had another decline to 52  although the D10 infusion had not been started yet.  D10 is now infusing an additional D50 bolus was administered.  Patient admitted for further evaluation.  No infectious source found the ED.  No other electrolyte imbalance noted.  EKG is nonischemic.        Procedure  Critical Care    Performed by: Mahad Leung DO  Authorized by: Mahad Leung DO    Critical care provider statement:     Critical care time (minutes):  35    Critical care time was exclusive of:  Separately billable procedures and treating other patients    Critical care was necessary to treat or prevent imminent or life-threatening deterioration of the following conditions:  Endocrine crisis    Critical care was time spent personally by me on the following activities:  Ordering and performing treatments and interventions, ordering and review of laboratory studies, ordering and review of radiographic studies, pulse oximetry, re-evaluation of patient's condition, evaluation of patient's response to treatment and development of treatment plan with patient or surrogate  ECG 12 lead    Performed by: Mahad Leung DO  Authorized by: Mahad Leung DO    ECG interpreted by ED Physician in the absence of a cardiologist: yes    Comments:      10/24/2024 23: 06 sinus rhythm, rate 94, normal axis, ST segments normal, T waves normal, normal EKG.  EKG interpreted by myself.       Mahad Leung DO  10/25/24 0028

## 2024-10-25 NOTE — CARE PLAN
This patient admitted with recurrent hypoglycemia he was recently admitted to ICU as well.  Was on D10 at 100 cc overnight, blood sugars are stabilized, will discontinue D10 and monitor blood sugars.  Last time he was here in September proinsulin and C-peptide levels were ordered.  Will consult endocrinology  May need to decrease his dose of Trulicity at discharge, patient says that he does not take any short acting insulin at home, also denies starving himself.  No insulin sliding scale for now, will replete magnesium and recheck a troponin.

## 2024-10-26 VITALS
SYSTOLIC BLOOD PRESSURE: 116 MMHG | BODY MASS INDEX: 28.45 KG/M2 | OXYGEN SATURATION: 98 % | RESPIRATION RATE: 16 BRPM | DIASTOLIC BLOOD PRESSURE: 56 MMHG | HEIGHT: 66 IN | TEMPERATURE: 98.1 F | WEIGHT: 177 LBS | HEART RATE: 74 BPM

## 2024-10-26 LAB
GLUCOSE BLD MANUAL STRIP-MCNC: 211 MG/DL (ref 74–99)
GLUCOSE BLD MANUAL STRIP-MCNC: 293 MG/DL (ref 74–99)
GLUCOSE BLD MANUAL STRIP-MCNC: 336 MG/DL (ref 74–99)

## 2024-10-26 PROCEDURE — 2500000002 HC RX 250 W HCPCS SELF ADMINISTERED DRUGS (ALT 637 FOR MEDICARE OP, ALT 636 FOR OP/ED): Performed by: STUDENT IN AN ORGANIZED HEALTH CARE EDUCATION/TRAINING PROGRAM

## 2024-10-26 PROCEDURE — 2500000001 HC RX 250 WO HCPCS SELF ADMINISTERED DRUGS (ALT 637 FOR MEDICARE OP): Performed by: STUDENT IN AN ORGANIZED HEALTH CARE EDUCATION/TRAINING PROGRAM

## 2024-10-26 PROCEDURE — 99239 HOSP IP/OBS DSCHRG MGMT >30: CPT | Performed by: STUDENT IN AN ORGANIZED HEALTH CARE EDUCATION/TRAINING PROGRAM

## 2024-10-26 PROCEDURE — G0378 HOSPITAL OBSERVATION PER HR: HCPCS

## 2024-10-26 PROCEDURE — 82947 ASSAY GLUCOSE BLOOD QUANT: CPT | Mod: 59

## 2024-10-26 RX ADMIN — FLUTICASONE FUROATE AND VILANTEROL TRIFENATATE 1 PUFF: 100; 25 POWDER RESPIRATORY (INHALATION) at 06:12

## 2024-10-26 RX ADMIN — PIOGLITAZONE HYDROCHLORIDE 30 MG: 30 TABLET ORAL at 08:08

## 2024-10-26 RX ADMIN — BENZTROPINE MESYLATE 0.5 MG: 1 TABLET ORAL at 08:08

## 2024-10-26 RX ADMIN — FLUOXETINE HYDROCHLORIDE 20 MG: 20 CAPSULE ORAL at 08:08

## 2024-10-26 RX ADMIN — BUSPIRONE HYDROCHLORIDE 10 MG: 5 TABLET ORAL at 08:08

## 2024-10-26 RX ADMIN — INSULIN LISPRO 6 UNITS: 100 INJECTION, SOLUTION INTRAVENOUS; SUBCUTANEOUS at 10:29

## 2024-10-26 ASSESSMENT — COGNITIVE AND FUNCTIONAL STATUS - GENERAL
MOBILITY SCORE: 24
DAILY ACTIVITIY SCORE: 24

## 2024-10-26 ASSESSMENT — PAIN SCALES - GENERAL: PAINLEVEL_OUTOF10: 0 - NO PAIN

## 2024-10-26 NOTE — DISCHARGE SUMMARY
Discharge Diagnosis  Hypoglycemia    Issues Requiring Follow-Up      Discharge Meds     Medication List      CONTINUE taking these medications     albuterol 90 mcg/actuation inhaler   atorvastatin 20 mg tablet; Commonly known as: Lipitor   benztropine 0.5 mg tablet; Commonly known as: Cogentin   budesonide-formoteroL 80-4.5 mcg/actuation inhaler; Commonly known as:   Symbicort   busPIRone 10 mg tablet; Commonly known as: Buspar   FLUoxetine 20 mg capsule; Commonly known as: PROzac   pioglitazone 30 mg tablet; Commonly known as: Actos   Trulicity 3 mg/0.5 mL pen injector; Generic drug: dulaglutide   ziprasidone 80 mg capsule; Commonly known as: Geodon       Test Results Pending At Discharge  Pending Labs       No current pending labs.            Hospital Course    54-year-old male with past medical history of diabetes, noncompliance, admission for hypoglycemia admitted for another hypoglycemia episode.  Initially he was treated with D10, was taken off yesterday morning and since then blood sugars have been stabilized in fact the been running high, patient was eating outside food, his Actos was restarted today and he has been started on insulin sliding scale.  I did have a detailed discussion with his endocrinologist who confirmed that the patient is possibly noncompliant and to discharge him on his current dose of Actos and Trulicity.  No insulin at home.  Patient did admit to taking insulin but did not tell me the dose says that his blood sugars went up to 500 and he got worried and t feels fine right now no symptomsook insulin.  Medically cleared for discharge.  Will follow-up with endocrinology.  We will give him 6 units of short acting insulin before discharge due to a blood sugar of 300.    Pertinent Physical Exam At Time of Discharge  Physical Exam  HENT:      Head: Normocephalic and atraumatic.   Eyes:      Extraocular Movements: Extraocular movements intact.      Conjunctiva/sclera: Conjunctivae normal.    Cardiovascular:      Rate and Rhythm: Normal rate and regular rhythm.   Pulmonary:      Effort: Pulmonary effort is normal. No respiratory distress.      Breath sounds: Normal breath sounds. No stridor.   Abdominal:      Palpations: Abdomen is soft.      Tenderness: There is no abdominal tenderness. There is no guarding or rebound.   Musculoskeletal:         General: No swelling or deformity.   Skin:     Findings: No lesion or rash.   Neurological:      General: No focal deficit present.      Mental Status: He is oriented to person, place, and time.   Psychiatric:         Mood and Affect: Mood normal.         Behavior: Behavior normal.         Outpatient Follow-Up  Future Appointments   Date Time Provider Department Center   11/20/2024  9:00 AM Bozena Sun RD, LD CHRISTEN Reyez MD

## 2024-10-26 NOTE — PROGRESS NOTES
Patient to discharge home, has follow up with Dr. Murphy for diabetes control. Plan is home, no needs.

## 2024-10-26 NOTE — CARE PLAN
The patient's goals for the shift include      The clinical goals for the shift include patient will remain hemodynamically stable through the shift

## 2024-10-30 ENCOUNTER — HOSPITAL ENCOUNTER (EMERGENCY)
Facility: HOSPITAL | Age: 54
Discharge: HOME | End: 2024-10-30
Attending: EMERGENCY MEDICINE
Payer: MEDICARE

## 2024-10-30 VITALS
TEMPERATURE: 97.7 F | DIASTOLIC BLOOD PRESSURE: 66 MMHG | SYSTOLIC BLOOD PRESSURE: 119 MMHG | WEIGHT: 170 LBS | RESPIRATION RATE: 16 BRPM | OXYGEN SATURATION: 98 % | BODY MASS INDEX: 27.32 KG/M2 | HEART RATE: 67 BPM | HEIGHT: 66 IN

## 2024-10-30 DIAGNOSIS — E11.9 TYPE 2 DIABETES MELLITUS WITHOUT COMPLICATION, WITHOUT LONG-TERM CURRENT USE OF INSULIN (MULTI): Primary | ICD-10-CM

## 2024-10-30 LAB — GLUCOSE BLD MANUAL STRIP-MCNC: 281 MG/DL (ref 74–99)

## 2024-10-30 PROCEDURE — 99282 EMERGENCY DEPT VISIT SF MDM: CPT

## 2024-10-30 PROCEDURE — 99283 EMERGENCY DEPT VISIT LOW MDM: CPT

## 2024-10-30 PROCEDURE — 82947 ASSAY GLUCOSE BLOOD QUANT: CPT

## 2024-10-30 ASSESSMENT — PAIN SCALES - GENERAL: PAINLEVEL_OUTOF10: 1

## 2024-10-30 ASSESSMENT — LIFESTYLE VARIABLES
EVER HAD A DRINK FIRST THING IN THE MORNING TO STEADY YOUR NERVES TO GET RID OF A HANGOVER: NO
EVER FELT BAD OR GUILTY ABOUT YOUR DRINKING: NO
HAVE PEOPLE ANNOYED YOU BY CRITICIZING YOUR DRINKING: NO
HAVE YOU EVER FELT YOU SHOULD CUT DOWN ON YOUR DRINKING: NO
TOTAL SCORE: 0

## 2024-10-30 ASSESSMENT — COLUMBIA-SUICIDE SEVERITY RATING SCALE - C-SSRS
2. HAVE YOU ACTUALLY HAD ANY THOUGHTS OF KILLING YOURSELF?: NO
1. IN THE PAST MONTH, HAVE YOU WISHED YOU WERE DEAD OR WISHED YOU COULD GO TO SLEEP AND NOT WAKE UP?: NO
6. HAVE YOU EVER DONE ANYTHING, STARTED TO DO ANYTHING, OR PREPARED TO DO ANYTHING TO END YOUR LIFE?: NO

## 2024-10-30 ASSESSMENT — PAIN - FUNCTIONAL ASSESSMENT: PAIN_FUNCTIONAL_ASSESSMENT: 0-10

## 2024-11-06 LAB
ATRIAL RATE: 94 BPM
P AXIS: 67 DEGREES
P OFFSET: 196 MS
P ONSET: 132 MS
PR INTERVAL: 160 MS
Q ONSET: 212 MS
QRS COUNT: 16 BEATS
QRS DURATION: 94 MS
QT INTERVAL: 420 MS
QTC CALCULATION(BAZETT): 525 MS
QTC FREDERICIA: 487 MS
R AXIS: 35 DEGREES
T AXIS: 65 DEGREES
T OFFSET: 422 MS
VENTRICULAR RATE: 94 BPM

## 2024-11-20 ENCOUNTER — NUTRITION (OUTPATIENT)
Dept: NUTRITION | Facility: HOSPITAL | Age: 54
End: 2024-11-20
Payer: MEDICARE

## 2024-11-20 VITALS — HEIGHT: 67 IN | WEIGHT: 166.9 LBS | BODY MASS INDEX: 26.19 KG/M2

## 2024-11-20 DIAGNOSIS — E10.65 TYPE 1 DIABETES MELLITUS WITH HYPERGLYCEMIA (MULTI): ICD-10-CM

## 2024-11-20 NOTE — PROGRESS NOTES
"Nutrition Assessment     Reason for Visit:  Carlyle Houser is a 54 y.o. male who presents for nutrition counseling in the context of type I DM.   Anthropometrics:  Wt Readings from Last 10 Encounters:   11/20/24 75.7 kg (166 lb 14.4 oz)   10/24/24 80.3 kg (177 lb)   09/02/24 85.2 kg (187 lb 13.3 oz)   03/26/24 99.3 kg (219 lb)   03/02/24 88.5 kg (195 lb)   02/29/24 90.7 kg (200 lb)   01/11/24 90.7 kg (200 lb)   01/08/24 89.2 kg (196 lb 9.6 oz)     Anthropometrics  Height: 170.2 cm (5' 7\")  Weight: 75.7 kg (166 lb 14.4 oz)  BMI (Calculated): 26.13    Lab Results   Component Value Date    HGBA1C 7.7 (H) 09/01/2024          Food And Nutrient Intake:  Food and Nutrient History  Food and Nutrient History: Pt present for nutrition eduication on diet with Type I DM.  Pt has been hospitalized several times with hypoglycemia.  He is here with his wife who does most of the meal preparation.  Pt shows an 11% weight loss over the past 2 months which he attributes to not being sure about what to eat. BMI is down to 26.  HgbA1C is 7.7.  Pt eats small amounts at breakfast and lunch.  He often then feels hungry and admits to overeating on snacks such as cookies and 1/2 a box of crackers.  Pt may benefit from consistent eating of meals- 3 meals and 1-2 snacks.  Recommend a consistent intake of carbohydrates around 60-75g per meal.  Hem ay benefit from packing some high protein snacks to bring to work with him so he does not become overly hungry.  Discussed strategy for hypoglycemic episodes.  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     Nutrition Focused Physical " "Exam:  Subcutaneous Fat Loss  Orbital Fat Pads: Well nourished (slightly bulging fat pads)  Buccal Fat Pads: Mild-Moderate (flat cheeks, minimal bounce)  Triceps: Mild-Moderate (less than ample fat tissue)  Ribs: Defer  Muscle Wasting  Temporalis: Mild-Moderate (slight depression)  Pectoralis (Clavicular Region): Mild-Moderate (some protrusion of clavicle)  Deltoid/Trapezius: Well nourished (rounded appearance at arm, shoulder, neck)  Interosseous: Well nourished (muscle bulges)  Trapezius/Infraspinatus/Supraspinatus (Scapular Region): Defer  Quadriceps: Defer  Gastrocnemius: Defer        Energy Needs  Calculated Energy Needs Using Equations  Height: 170.2 cm (5' 7\")  Estimated Energy Needs  Total Energy Estimated Needs (kCal): 2250 kCal  Method for Estimating Needs: 30 kcals/kg  Estimated Protein Needs  Total Protein Estimated Needs (g): 75 g  Method for Estimating Needs: 1.0g/kg        Nutrition Diagnosis   Malnutrition Diagnosis  Patient has Malnutrition Diagnosis: Yes  Diagnosis Status: New  Malnutrition Diagnosis: Moderate malnutrition related to starvation  As Evidenced by: 11% weight loss x 2 months; inadequate energy intake  Nutrition Diagnosis  Patient has Nutrition Diagnosis: Yes  Diagnosis Status (1): New  Nutrition Diagnosis 1: Undesirable food choices  Related to (1): type I diabetes  As Evidenced by (1): pt report; diet recall  Additional Nutrition Diagnosis: Diagnosis 2  Diagnosis Status (2): New  Nutrition Diagnosis 2: Unintended weight loss  Related to (2): confusion regarding diet  As Evidenced by (2): pt interview; diet recall    Nutrition Interventions/Recommendations   Food and Nutrition Delivery  Meals & Snacks: Carbohydrate-modified diet, Energy-modified diet, Fiber-modified diet, General Healthful Diet, Modify schedule of foods/fluids, Protein-modified diet  Goals: 45 g carb per meal; 3 meals with 1-2 snacks per day; ~1200 daily kcal intake; ensure adequate protein sources with each meal and " snack  Medical Food Supplement: Other, Specify:  Goals: Glucerna daily  Nutrition Education  Nutrition Education Content: Content related nutrition education, Education on nutrition's influence on health, Physical activity guidance  Goals: Instructed on consistent carbohydrate intake, blood sugar goals, exercise, proper portion sizes, label reading, and general healthful nutrition. Instructed on benefits of wt loss with diabetes and heart health. Both verbal and written education provided in the one-on-one setting.Pt verbalized understanding of information provided. All questions answered to pt satisfaction throughout visit.        Patient Instructions   Consistency of meals is important!  Eat 3 meals per day with snacks.  Eat an hour after waking up and don't go more than 3-4 hours without eating.    Keep carbohydrates consistent from meal to meal.  Aim for 60 g of carbohydrate per meal.  Refer to carbohydrate lists for serving sizes.   Read labels- 15g of total carbohydrate is a serving.     - Choose foods that have fiber listed on the labels.  This helps slow down the release of sugar in your bloodstream.    Include a source of protein with all meals and snack such as meat, fish, nuts, peanut butter, yogurt, cottage cheese or eggs.    -Keep high protein snacks on hand at home- see snack handout.  Maintain your weight   6.  Drink 64 oz of water other non-caloric beverage.  7.  Aim for 30 minutes of exercise such as walking on most days.        Nutrition Monitoring and Evaluation   Food/Nutrient Related History Monitoring  Monitoring and Evaluation Plan: Energy intake, Fluid intake, Meal/snack pattern, Protein intake, Carbohydrate intake, Fiber intake  Criteria: consume 2200 kcal/day  Criteria: aim for 64 oz of water daily, limit sugar sweetened beveraged  Criteria: 3 meals per day with 1-2 snacks between meals  Criteria: ensure adequate protein at each meal and snack ~ 20-30 g/meal  Estimated carbohydrate intake:  Estimated carbohydrate intake  Criteria: 60 g carb per meal  Criteria: ensure adequate fiber is present at meals - focus on 1/2 plate nonstarchy vegetables and choosing whole grain foods when able  Knowledge/Beliefs/Attitudes  Monitoring and Evaluation Plan: Physical activity  Criteria: Encouraged regular physical activity including strength training as medically appropriate per AHA guidelines  Body Composition/Growth/Weight History  Monitoring and Evaluation Plan: Weight change  Weight Change: Weight loss  Criteria: 1-2 lb wt loss per week  Biochemical Data, Medical Tests and Procedures  Monitoring and Evaluation Plan: Glucose/endocrine profile  Glucose/Endocrine Profile: Hemoglobin A1c (HgbA1c), Glucose, fasting  Criteria: A1c <7%; fasting blood glucose  mg/dl      Readiness to Change : Fair  Level of Understanding : Fair  Anticipated Compliant : Fair

## 2025-01-09 ENCOUNTER — HOSPITAL ENCOUNTER (EMERGENCY)
Facility: HOSPITAL | Age: 55
Discharge: HOME | End: 2025-01-10
Attending: STUDENT IN AN ORGANIZED HEALTH CARE EDUCATION/TRAINING PROGRAM
Payer: MEDICARE

## 2025-01-09 ENCOUNTER — APPOINTMENT (OUTPATIENT)
Dept: CARDIOLOGY | Facility: HOSPITAL | Age: 55
End: 2025-01-09
Payer: MEDICARE

## 2025-01-09 DIAGNOSIS — E16.2 SEIZURE DUE TO HYPOGLYCEMIA (MULTI): ICD-10-CM

## 2025-01-09 DIAGNOSIS — R56.9 SEIZURE DUE TO HYPOGLYCEMIA (MULTI): ICD-10-CM

## 2025-01-09 DIAGNOSIS — E11.649 HYPOGLYCEMIA ASSOCIATED WITH TYPE 2 DIABETES MELLITUS (MULTI): Primary | ICD-10-CM

## 2025-01-09 LAB
ALBUMIN SERPL BCP-MCNC: 4.1 G/DL (ref 3.4–5)
ALP SERPL-CCNC: 82 U/L (ref 33–120)
ALT SERPL W P-5'-P-CCNC: 19 U/L (ref 10–52)
ANION GAP SERPL CALC-SCNC: 15 MMOL/L (ref 10–20)
AST SERPL W P-5'-P-CCNC: 19 U/L (ref 9–39)
BASOPHILS # BLD AUTO: 0.03 X10*3/UL (ref 0–0.1)
BASOPHILS NFR BLD AUTO: 0.3 %
BILIRUB SERPL-MCNC: 0.3 MG/DL (ref 0–1.2)
BUN SERPL-MCNC: 23 MG/DL (ref 6–23)
CALCIUM SERPL-MCNC: 9.6 MG/DL (ref 8.6–10.3)
CHLORIDE SERPL-SCNC: 103 MMOL/L (ref 98–107)
CO2 SERPL-SCNC: 23 MMOL/L (ref 21–32)
CREAT SERPL-MCNC: 1.31 MG/DL (ref 0.5–1.3)
EGFRCR SERPLBLD CKD-EPI 2021: 64 ML/MIN/1.73M*2
EOSINOPHIL # BLD AUTO: 0.17 X10*3/UL (ref 0–0.7)
EOSINOPHIL NFR BLD AUTO: 1.6 %
ERYTHROCYTE [DISTWIDTH] IN BLOOD BY AUTOMATED COUNT: 12.5 % (ref 11.5–14.5)
GLUCOSE BLD MANUAL STRIP-MCNC: 31 MG/DL (ref 74–99)
GLUCOSE BLD MANUAL STRIP-MCNC: 49 MG/DL (ref 74–99)
GLUCOSE BLD MANUAL STRIP-MCNC: 59 MG/DL (ref 74–99)
GLUCOSE BLD MANUAL STRIP-MCNC: 59 MG/DL (ref 74–99)
GLUCOSE SERPL-MCNC: 29 MG/DL (ref 74–99)
HCT VFR BLD AUTO: 36.8 % (ref 41–52)
HGB BLD-MCNC: 12.9 G/DL (ref 13.5–17.5)
IMM GRANULOCYTES # BLD AUTO: 0.04 X10*3/UL (ref 0–0.7)
IMM GRANULOCYTES NFR BLD AUTO: 0.4 % (ref 0–0.9)
LACTATE SERPL-SCNC: 1.5 MMOL/L (ref 0.4–2)
LACTATE SERPL-SCNC: 3.7 MMOL/L (ref 0.4–2)
LYMPHOCYTES # BLD AUTO: 1.72 X10*3/UL (ref 1.2–4.8)
LYMPHOCYTES NFR BLD AUTO: 16.1 %
MAGNESIUM SERPL-MCNC: 1.84 MG/DL (ref 1.6–2.4)
MCH RBC QN AUTO: 30.6 PG (ref 26–34)
MCHC RBC AUTO-ENTMCNC: 35.1 G/DL (ref 32–36)
MCV RBC AUTO: 87 FL (ref 80–100)
MONOCYTES # BLD AUTO: 0.79 X10*3/UL (ref 0.1–1)
MONOCYTES NFR BLD AUTO: 7.4 %
NEUTROPHILS # BLD AUTO: 7.94 X10*3/UL (ref 1.2–7.7)
NEUTROPHILS NFR BLD AUTO: 74.2 %
NRBC BLD-RTO: 0 /100 WBCS (ref 0–0)
PLATELET # BLD AUTO: 269 X10*3/UL (ref 150–450)
POTASSIUM SERPL-SCNC: 3 MMOL/L (ref 3.5–5.3)
PROT SERPL-MCNC: 7.1 G/DL (ref 6.4–8.2)
RBC # BLD AUTO: 4.21 X10*6/UL (ref 4.5–5.9)
SODIUM SERPL-SCNC: 138 MMOL/L (ref 136–145)
WBC # BLD AUTO: 10.7 X10*3/UL (ref 4.4–11.3)

## 2025-01-09 PROCEDURE — 96360 HYDRATION IV INFUSION INIT: CPT

## 2025-01-09 PROCEDURE — 2500000004 HC RX 250 GENERAL PHARMACY W/ HCPCS (ALT 636 FOR OP/ED): Performed by: STUDENT IN AN ORGANIZED HEALTH CARE EDUCATION/TRAINING PROGRAM

## 2025-01-09 PROCEDURE — 96361 HYDRATE IV INFUSION ADD-ON: CPT

## 2025-01-09 PROCEDURE — 99284 EMERGENCY DEPT VISIT MOD MDM: CPT | Mod: 25

## 2025-01-09 PROCEDURE — 93005 ELECTROCARDIOGRAM TRACING: CPT

## 2025-01-09 PROCEDURE — 84075 ASSAY ALKALINE PHOSPHATASE: CPT | Performed by: STUDENT IN AN ORGANIZED HEALTH CARE EDUCATION/TRAINING PROGRAM

## 2025-01-09 PROCEDURE — 85025 COMPLETE CBC W/AUTO DIFF WBC: CPT | Performed by: STUDENT IN AN ORGANIZED HEALTH CARE EDUCATION/TRAINING PROGRAM

## 2025-01-09 PROCEDURE — 82947 ASSAY GLUCOSE BLOOD QUANT: CPT

## 2025-01-09 PROCEDURE — 83605 ASSAY OF LACTIC ACID: CPT | Performed by: STUDENT IN AN ORGANIZED HEALTH CARE EDUCATION/TRAINING PROGRAM

## 2025-01-09 PROCEDURE — 2500000002 HC RX 250 W HCPCS SELF ADMINISTERED DRUGS (ALT 637 FOR MEDICARE OP, ALT 636 FOR OP/ED): Performed by: STUDENT IN AN ORGANIZED HEALTH CARE EDUCATION/TRAINING PROGRAM

## 2025-01-09 PROCEDURE — 36415 COLL VENOUS BLD VENIPUNCTURE: CPT | Performed by: STUDENT IN AN ORGANIZED HEALTH CARE EDUCATION/TRAINING PROGRAM

## 2025-01-09 PROCEDURE — 83735 ASSAY OF MAGNESIUM: CPT | Performed by: STUDENT IN AN ORGANIZED HEALTH CARE EDUCATION/TRAINING PROGRAM

## 2025-01-09 RX ORDER — DEXTROSE MONOHYDRATE 100 MG/ML
125 INJECTION, SOLUTION INTRAVENOUS CONTINUOUS
Status: DISCONTINUED | OUTPATIENT
Start: 2025-01-09 | End: 2025-01-10 | Stop reason: HOSPADM

## 2025-01-09 RX ORDER — POTASSIUM CHLORIDE 20 MEQ/1
20 TABLET, EXTENDED RELEASE ORAL ONCE
Status: COMPLETED | OUTPATIENT
Start: 2025-01-09 | End: 2025-01-09

## 2025-01-09 RX ADMIN — DEXTROSE MONOHYDRATE 125 ML/HR: 100 INJECTION, SOLUTION INTRAVENOUS at 22:08

## 2025-01-09 RX ADMIN — POTASSIUM CHLORIDE 20 MEQ: 1500 TABLET, EXTENDED RELEASE ORAL at 23:26

## 2025-01-09 ASSESSMENT — LIFESTYLE VARIABLES
TOTAL SCORE: 0
HAVE YOU EVER FELT YOU SHOULD CUT DOWN ON YOUR DRINKING: NO
HAVE PEOPLE ANNOYED YOU BY CRITICIZING YOUR DRINKING: NO
EVER HAD A DRINK FIRST THING IN THE MORNING TO STEADY YOUR NERVES TO GET RID OF A HANGOVER: NO
EVER FELT BAD OR GUILTY ABOUT YOUR DRINKING: NO

## 2025-01-09 ASSESSMENT — PAIN SCALES - GENERAL: PAINLEVEL_OUTOF10: 0 - NO PAIN

## 2025-01-09 ASSESSMENT — PAIN - FUNCTIONAL ASSESSMENT: PAIN_FUNCTIONAL_ASSESSMENT: 0-10

## 2025-01-10 VITALS
SYSTOLIC BLOOD PRESSURE: 138 MMHG | OXYGEN SATURATION: 99 % | TEMPERATURE: 97.7 F | HEART RATE: 89 BPM | HEIGHT: 66 IN | DIASTOLIC BLOOD PRESSURE: 82 MMHG | WEIGHT: 166 LBS | RESPIRATION RATE: 18 BRPM | BODY MASS INDEX: 26.68 KG/M2

## 2025-01-10 LAB
ATRIAL RATE: 100 BPM
GLUCOSE BLD MANUAL STRIP-MCNC: 147 MG/DL (ref 74–99)
GLUCOSE BLD MANUAL STRIP-MCNC: 151 MG/DL (ref 74–99)
P AXIS: 69 DEGREES
P OFFSET: 199 MS
P ONSET: 138 MS
PR INTERVAL: 156 MS
Q ONSET: 216 MS
QRS COUNT: 17 BEATS
QRS DURATION: 88 MS
QT INTERVAL: 404 MS
QTC CALCULATION(BAZETT): 521 MS
QTC FREDERICIA: 479 MS
R AXIS: 28 DEGREES
T AXIS: 63 DEGREES
T OFFSET: 418 MS
VENTRICULAR RATE: 100 BPM

## 2025-01-10 PROCEDURE — 82947 ASSAY GLUCOSE BLOOD QUANT: CPT

## 2025-01-10 NOTE — ED PROVIDER NOTES
HPI   Chief Complaint   Patient presents with    Hypoglycemia     Patient stated he had a seizure earlier as well. No HX of seizures. Type 2 DM.          History provided by:  Patient and EMS personnel  54-year-old male brought in by EMS from home for evaluation of low blood sugar.  Patient has a history of type 2 diabetes on Trulicity.  Patient denies any recent illness or changes in medications.  No vomiting or diarrhea.  States that he thinks he may have had a seizure at home while he was laying down in bed.  EMS report that blood sugar was 41 upon their arrival.  Patient was treated with D10.        Patient History   Past Medical History:   Diagnosis Date    Anxiety     Asthma ([facility])     Diabetes mellitus (Multi)     Hypertension      No past surgical history on file.  No family history on file.  Social History     Tobacco Use    Smoking status: Never    Smokeless tobacco: Never   Vaping Use    Vaping status: Never Used   Substance Use Topics    Alcohol use: Never    Drug use: Never       Physical Exam   ED Triage Vitals   Temp Pulse Resp BP   -- -- -- --      SpO2 Temp src Heart Rate Source Patient Position   -- -- -- --      BP Location FiO2 (%)     -- --       Physical Exam  Vitals and nursing note reviewed.   Constitutional:       General: He is not in acute distress.  HENT:      Head: Atraumatic.      Mouth/Throat:      Mouth: Mucous membranes are moist.      Pharynx: Oropharynx is clear.   Eyes:      Extraocular Movements: Extraocular movements intact.      Conjunctiva/sclera: Conjunctivae normal.      Pupils: Pupils are equal, round, and reactive to light.   Cardiovascular:      Rate and Rhythm: Normal rate and regular rhythm.      Pulses: Normal pulses.   Pulmonary:      Effort: Pulmonary effort is normal. No respiratory distress.      Breath sounds: Normal breath sounds.   Abdominal:      General: There is no distension.      Palpations: Abdomen is soft.      Tenderness: There is no abdominal  tenderness. There is no guarding or rebound.   Musculoskeletal:         General: No deformity.      Cervical back: Neck supple.   Skin:     General: Skin is warm and dry.   Neurological:      Mental Status: He is alert and oriented to person, place, and time. Mental status is at baseline.      Cranial Nerves: No cranial nerve deficit.      Sensory: No sensory deficit.      Motor: No weakness.   Psychiatric:         Mood and Affect: Mood normal.         Behavior: Behavior normal.           ED Course & MDM   Diagnoses as of 01/10/25 0215   Hypoglycemia associated with type 2 diabetes mellitus (Multi)   Seizure due to hypoglycemia (Multi)                 No data recorded                         Labs Reviewed   CBC WITH AUTO DIFFERENTIAL - Abnormal       Result Value    WBC 10.7      nRBC 0.0      RBC 4.21 (*)     Hemoglobin 12.9 (*)     Hematocrit 36.8 (*)     MCV 87      MCH 30.6      MCHC 35.1      RDW 12.5      Platelets 269      Neutrophils % 74.2      Immature Granulocytes %, Automated 0.4      Lymphocytes % 16.1      Monocytes % 7.4      Eosinophils % 1.6      Basophils % 0.3      Neutrophils Absolute 7.94 (*)     Immature Granulocytes Absolute, Automated 0.04      Lymphocytes Absolute 1.72      Monocytes Absolute 0.79      Eosinophils Absolute 0.17      Basophils Absolute 0.03     COMPREHENSIVE METABOLIC PANEL - Abnormal    Glucose 29 (*)     Sodium 138      Potassium 3.0 (*)     Chloride 103      Bicarbonate 23      Anion Gap 15      Urea Nitrogen 23      Creatinine 1.31 (*)     eGFR 64      Calcium 9.6      Albumin 4.1      Alkaline Phosphatase 82      Total Protein 7.1      AST 19      Bilirubin, Total 0.3      ALT 19     LACTATE - Abnormal    Lactate 3.7 (*)     Narrative:     Venipuncture immediately after or during the administration of Metamizole may lead to falsely low results. Testing should be performed immediately prior to Metamizole dosing.   POCT GLUCOSE - Abnormal    POCT Glucose 31 (*)    POCT  GLUCOSE - Abnormal    POCT Glucose 49 (*)    POCT GLUCOSE - Abnormal    POCT Glucose 59 (*)    POCT GLUCOSE - Abnormal    POCT Glucose 59 (*)    POCT GLUCOSE - Abnormal    POCT Glucose 151 (*)    POCT GLUCOSE - Abnormal    POCT Glucose 147 (*)    MAGNESIUM - Normal    Magnesium 1.84     LACTATE - Normal    Lactate 1.5      Narrative:     Venipuncture immediately after or during the administration of Metamizole may lead to falsely low results. Testing should be performed immediately prior to Metamizole dosing.             Medical Decision Making  Amount and/or Complexity of Data Reviewed  ECG/medicine tests: ordered and independent interpretation performed. Decision-making details documented in ED Course.     Details: Twelve-lead ECG obtained at 2123 by my interpretation demonstrates normal sinus rhythm with a rate of 100, no STEMI.  Baseline artifact from tremors.  Nonspecific ST changes.    54-year-old male with a history of type 2 diabetes presenting with hypoglycemic episode and possible seizure activity.  No prior history of seizures.  Unclear if this was related to patient's hypoglycemic event.  Patient treated with D10 prior to arrival.  On arrival to the ED patient is awake and alert, GCS 15.  Blood sugar 31.  Patient given oral intake while labs obtained.  Blood sugar gradually improving to 49.  Started on D10 infusion at 125 mL/h.  Patient's labs reviewed.  Mild hypokalemia, given oral repletion in the ED.  Lactic acid initially elevated.  This normalized on repeat.  CBC without leukocytosis.  Patient's blood sugar gradually improved, tolerating p.o. in the ED.  D10 drip was discontinued and patient was monitored in the ED.  Repeat blood glucose is stable.  Suspect likely hypoglycemic seizure.  Discussed close monitoring of blood sugars and close follow-up with primary care.  Procedure  Procedures     Mino Noyola MD  01/10/25 0215

## 2025-01-17 ENCOUNTER — APPOINTMENT (OUTPATIENT)
Dept: NUTRITION | Facility: HOSPITAL | Age: 55
End: 2025-01-17
Payer: MEDICARE

## 2025-01-17 NOTE — PROGRESS NOTES
Nutrition Assessment     Reason for Visit:  Carlyle Houser is a 54 y.o. male who presents for follow-up nutrition visit in the context of type I DM.      Anthropometrics:  Wt Readings from Last 10 Encounters:   01/09/25 75.3 kg (166 lb)   11/20/24 75.7 kg (166 lb 14.4 oz)   10/24/24 80.3 kg (177 lb)   09/02/24 85.2 kg (187 lb 13.3 oz)   03/26/24 99.3 kg (219 lb)   03/02/24 88.5 kg (195 lb)   02/29/24 90.7 kg (200 lb)   01/11/24 90.7 kg (200 lb)   01/08/24 89.2 kg (196 lb 9.6 oz)     Lab Results   Component Value Date    HGBA1C 7.7 (H) 09/01/2024           Food And Nutrient Intake:                                                                 Food And Nutrient Administration:                        Factors:                         Physical Activities:              Knowledge Beliefs Attitudes and Behavior                                       Nutrition Focused Physical Exam:           Energy Needs           Nutrition Diagnosis           Nutrition Interventions/Recommendations            There are no Patient Instructions on file for this visit.    Nutrition Monitoring and Evaluation                   {Readiness to Change (Optional):47825}  {Level of Understanding (Optional):98620}  {Anticipated Compliant (Optional):11766}

## 2025-01-19 LAB
ATRIAL RATE: 100 BPM
P AXIS: 69 DEGREES
P OFFSET: 199 MS
P ONSET: 138 MS
PR INTERVAL: 156 MS
Q ONSET: 216 MS
QRS COUNT: 17 BEATS
QRS DURATION: 88 MS
QT INTERVAL: 404 MS
QTC CALCULATION(BAZETT): 521 MS
QTC FREDERICIA: 479 MS
R AXIS: 28 DEGREES
T AXIS: 63 DEGREES
T OFFSET: 418 MS
VENTRICULAR RATE: 100 BPM

## 2025-01-31 ENCOUNTER — HOSPITAL ENCOUNTER (EMERGENCY)
Facility: HOSPITAL | Age: 55
Discharge: HOME | End: 2025-01-31
Attending: EMERGENCY MEDICINE
Payer: MEDICARE

## 2025-01-31 ENCOUNTER — APPOINTMENT (OUTPATIENT)
Dept: RADIOLOGY | Facility: HOSPITAL | Age: 55
End: 2025-01-31
Payer: MEDICARE

## 2025-01-31 ENCOUNTER — NUTRITION (OUTPATIENT)
Dept: NUTRITION | Facility: HOSPITAL | Age: 55
End: 2025-01-31
Payer: MEDICARE

## 2025-01-31 ENCOUNTER — APPOINTMENT (OUTPATIENT)
Dept: CARDIOLOGY | Facility: HOSPITAL | Age: 55
End: 2025-01-31
Payer: MEDICARE

## 2025-01-31 VITALS
TEMPERATURE: 97.3 F | OXYGEN SATURATION: 98 % | HEIGHT: 67 IN | DIASTOLIC BLOOD PRESSURE: 72 MMHG | HEART RATE: 97 BPM | RESPIRATION RATE: 18 BRPM | BODY MASS INDEX: 26 KG/M2 | SYSTOLIC BLOOD PRESSURE: 147 MMHG

## 2025-01-31 DIAGNOSIS — E16.2 HYPOGLYCEMIA: Primary | ICD-10-CM

## 2025-01-31 DIAGNOSIS — E10.649 TYPE 1 DIABETES MELLITUS WITH HYPOGLYCEMIA AND WITHOUT COMA: Primary | ICD-10-CM

## 2025-01-31 DIAGNOSIS — R40.4 ALTERED LEVEL OF CONSCIOUSNESS: ICD-10-CM

## 2025-01-31 LAB
ALBUMIN SERPL BCP-MCNC: 3.9 G/DL (ref 3.4–5)
ALP SERPL-CCNC: 81 U/L (ref 33–120)
ALT SERPL W P-5'-P-CCNC: 13 U/L (ref 10–52)
ANION GAP SERPL CALC-SCNC: 9 MMOL/L (ref 10–20)
AST SERPL W P-5'-P-CCNC: 13 U/L (ref 9–39)
BASOPHILS # BLD AUTO: 0.03 X10*3/UL (ref 0–0.1)
BASOPHILS NFR BLD AUTO: 0.3 %
BILIRUB SERPL-MCNC: 0.2 MG/DL (ref 0–1.2)
BUN SERPL-MCNC: 23 MG/DL (ref 6–23)
CALCIUM SERPL-MCNC: 9 MG/DL (ref 8.6–10.3)
CARDIAC TROPONIN I PNL SERPL HS: 12 NG/L (ref 0–20)
CHLORIDE SERPL-SCNC: 109 MMOL/L (ref 98–107)
CO2 SERPL-SCNC: 25 MMOL/L (ref 21–32)
CREAT SERPL-MCNC: 1.12 MG/DL (ref 0.5–1.3)
EGFRCR SERPLBLD CKD-EPI 2021: 78 ML/MIN/1.73M*2
EOSINOPHIL # BLD AUTO: 0.03 X10*3/UL (ref 0–0.7)
EOSINOPHIL NFR BLD AUTO: 0.3 %
ERYTHROCYTE [DISTWIDTH] IN BLOOD BY AUTOMATED COUNT: 12.7 % (ref 11.5–14.5)
GLUCOSE BLD MANUAL STRIP-MCNC: 172 MG/DL (ref 74–99)
GLUCOSE BLD MANUAL STRIP-MCNC: 218 MG/DL (ref 74–99)
GLUCOSE BLD MANUAL STRIP-MCNC: 228 MG/DL (ref 74–99)
GLUCOSE BLD MANUAL STRIP-MCNC: 61 MG/DL (ref 74–99)
GLUCOSE SERPL-MCNC: 66 MG/DL (ref 74–99)
HCT VFR BLD AUTO: 36 % (ref 41–52)
HGB BLD-MCNC: 12.5 G/DL (ref 13.5–17.5)
IMM GRANULOCYTES # BLD AUTO: 0.03 X10*3/UL (ref 0–0.7)
IMM GRANULOCYTES NFR BLD AUTO: 0.3 % (ref 0–0.9)
LYMPHOCYTES # BLD AUTO: 0.48 X10*3/UL (ref 1.2–4.8)
LYMPHOCYTES NFR BLD AUTO: 4.4 %
MAGNESIUM SERPL-MCNC: 1.84 MG/DL (ref 1.6–2.4)
MCH RBC QN AUTO: 30.9 PG (ref 26–34)
MCHC RBC AUTO-ENTMCNC: 34.7 G/DL (ref 32–36)
MCV RBC AUTO: 89 FL (ref 80–100)
MONOCYTES # BLD AUTO: 0.43 X10*3/UL (ref 0.1–1)
MONOCYTES NFR BLD AUTO: 3.9 %
NEUTROPHILS # BLD AUTO: 10 X10*3/UL (ref 1.2–7.7)
NEUTROPHILS NFR BLD AUTO: 90.8 %
NRBC BLD-RTO: 0 /100 WBCS (ref 0–0)
PLATELET # BLD AUTO: 214 X10*3/UL (ref 150–450)
POTASSIUM SERPL-SCNC: 3.8 MMOL/L (ref 3.5–5.3)
PROT SERPL-MCNC: 6.5 G/DL (ref 6.4–8.2)
RBC # BLD AUTO: 4.05 X10*6/UL (ref 4.5–5.9)
SODIUM SERPL-SCNC: 139 MMOL/L (ref 136–145)
WBC # BLD AUTO: 11 X10*3/UL (ref 4.4–11.3)

## 2025-01-31 PROCEDURE — 36415 COLL VENOUS BLD VENIPUNCTURE: CPT | Performed by: EMERGENCY MEDICINE

## 2025-01-31 PROCEDURE — 83735 ASSAY OF MAGNESIUM: CPT | Performed by: EMERGENCY MEDICINE

## 2025-01-31 PROCEDURE — 96374 THER/PROPH/DIAG INJ IV PUSH: CPT

## 2025-01-31 PROCEDURE — 84484 ASSAY OF TROPONIN QUANT: CPT | Performed by: EMERGENCY MEDICINE

## 2025-01-31 PROCEDURE — 99285 EMERGENCY DEPT VISIT HI MDM: CPT | Mod: 25 | Performed by: EMERGENCY MEDICINE

## 2025-01-31 PROCEDURE — 71045 X-RAY EXAM CHEST 1 VIEW: CPT | Mod: FOREIGN READ | Performed by: RADIOLOGY

## 2025-01-31 PROCEDURE — 2500000005 HC RX 250 GENERAL PHARMACY W/O HCPCS: Performed by: EMERGENCY MEDICINE

## 2025-01-31 PROCEDURE — 93005 ELECTROCARDIOGRAM TRACING: CPT

## 2025-01-31 PROCEDURE — 71045 X-RAY EXAM CHEST 1 VIEW: CPT

## 2025-01-31 PROCEDURE — 80053 COMPREHEN METABOLIC PANEL: CPT | Performed by: EMERGENCY MEDICINE

## 2025-01-31 PROCEDURE — 97803 MED NUTRITION INDIV SUBSEQ: CPT | Performed by: DIETITIAN, REGISTERED

## 2025-01-31 PROCEDURE — 85025 COMPLETE CBC W/AUTO DIFF WBC: CPT | Performed by: EMERGENCY MEDICINE

## 2025-01-31 PROCEDURE — 82947 ASSAY GLUCOSE BLOOD QUANT: CPT

## 2025-01-31 RX ORDER — DEXTROSE 50 % IN WATER (D50W) INTRAVENOUS SYRINGE
12.5 ONCE
Status: COMPLETED | OUTPATIENT
Start: 2025-01-31 | End: 2025-01-31

## 2025-01-31 RX ADMIN — DEXTROSE MONOHYDRATE 12.5 G: 25 INJECTION, SOLUTION INTRAVENOUS at 21:11

## 2025-01-31 ASSESSMENT — PAIN - FUNCTIONAL ASSESSMENT: PAIN_FUNCTIONAL_ASSESSMENT: 0-10

## 2025-01-31 ASSESSMENT — LIFESTYLE VARIABLES
EVER HAD A DRINK FIRST THING IN THE MORNING TO STEADY YOUR NERVES TO GET RID OF A HANGOVER: NO
HAVE PEOPLE ANNOYED YOU BY CRITICIZING YOUR DRINKING: NO
HAVE YOU EVER FELT YOU SHOULD CUT DOWN ON YOUR DRINKING: NO
TOTAL SCORE: 0
EVER FELT BAD OR GUILTY ABOUT YOUR DRINKING: NO

## 2025-01-31 ASSESSMENT — PAIN SCALES - GENERAL: PAINLEVEL_OUTOF10: 0 - NO PAIN

## 2025-01-31 NOTE — PROGRESS NOTES
Nutrition Assessment     Reason for Visit:  Carlyle Houser is a 54 y.o. male who presents for follow-up nutrition visit with type I DM.      Anthropometrics:  Wt Readings from Last 10 Encounters:   01/09/25 75.3 kg (166 lb)   11/20/24 75.7 kg (166 lb 14.4 oz)   10/24/24 80.3 kg (177 lb)   09/02/24 85.2 kg (187 lb 13.3 oz)   03/26/24 99.3 kg (219 lb)   03/02/24 88.5 kg (195 lb)   02/29/24 90.7 kg (200 lb)   01/11/24 90.7 kg (200 lb)   01/08/24 89.2 kg (196 lb 9.6 oz)     Lab Results   Component Value Date    HGBA1C 7.7 (H) 09/01/2024          Food And Nutrient Intake:  Food and Nutrient History  Food and Nutrient History: Pt present for follow-up nutrition eduication on diet with Type I DM.  Pt has been hospitalized several times with hypoglycemia.  He is here with his wife who does most of the meal preparation. Pt's weight has stabilized at 166lbs.  Pt states that his blood sugars are better but he does get some low readings before dinner time.  His wife is diabetic as well and does most of the meal preparation and shopping.  Pt admits to having sugary drinks.  His goal is to cut back on regular soda completely and drink more diet Snapple and tea.  Food Intolerance: lactose     Food Intake  Meal 1: less cereal, toast and peanut butter, sometimes eggs, oatmeal  Meal 2: salad, soup, chicken bowl,  Snacks : crackers, cheese  Meal 3: chicken and a side, little pasta, some potatoes  Snacks: popcorn  Fluid Intake: Koolaid, Propel , water, Diet Pepsi, milk x 2; sugar free coffee with splends     Physical Activities:  Physical Activity  Type of Physical Activity: Taking a walk in the park this weekend- weather permitting.        Nutrition Focused Physical Exam:  Deferred- no malnutrition suspected.       Energy Needs  Estimated Energy Needs  Total Energy Estimated Needs in 24 hours (kCal): 2250 kCal  Method for Estimating Needs: 30 kcals/kg  Estimated Protein Needs  Total Protein Estimated Needs in 24 Hours (g): 75 g  Method  for Estimating 24 Hour Protein Needs: 1.0g/kg    Nutrition Diagnosis   Malnutrition Diagnosis  Patient has Malnutrition Diagnosis: Yes  Diagnosis Status: Resolved  Nutrition Diagnosis  Patient has Nutrition Diagnosis: Yes  Diagnosis Status (1): Active  Nutrition Diagnosis 1: Undesirable food choices  Related to (1): type I diabetes  As Evidenced by (1): pt report; diet recall  Additional Nutrition Diagnosis: Diagnosis 2  Diagnosis Status (2): Resolved    Nutrition Interventions/Recommendations   Food and Nutrition Delivery  Goals: 45 g carb per meal; 3 meals with 1-2 snacks per day; ~2200 daily kcal intake; ensure adequate protein sources with each meal and snack  Medical Food Supplement: Other, Specify:  Goals: Glucerna daily  Nutrition Counseling  Theoretical Basis/Approach: Nutrition counseling based on health belief model  Nutrition Counseling Strategies : Nutrition counseling based on goal setting strategy, Nutrition counseling based on motivational interviewing strategy        Patient Instructions   Consistency of meals is important!  Eat 3 meals per day with snacks.  Eat an hour after waking up and don't go more than 3-4 hours without eating.     - Pack 1 or 2 snacks to have at work from the snack list   Include a source of protein with all meals and snack such as meat, fish, nuts, peanut butter, yogurt, cottage cheese or eggs.    3.   Cut out regular soda pop and choose more sugar free beverages.    4.   Aim for 30 minutes of exercise such as walking on most days.       Nutrition Monitoring and Evaluation   Food and Nutrient Intake  Monitoring and Evaluation Plan: Energy intake, Fluid intake, Meal/snack pattern, Protein intake, Carbohydrate intake, Fiber intake  Criteria: consume 2200 kcal/day  Criteria: aim for 64 oz of water daily, limit sugar sweetened beveraged  Criteria: 3 meals per day with 1-2 snacks between meals  Criteria: ensure adequate protein at each meal and snack ~ 20-30 g/meal  Estimated  carbohydrate intake: Estimated carbohydrate intake  Criteria: 60 g carb per meal  Criteria: ensure adequate fiber is present at meals - focus on 1/2 plate nonstarchy vegetables and choosing whole grain foods when able  Biochemical Data, Medical Tests and Procedures  Monitoring and Evaluation Plan: Glucose/endocrine profile  Glucose/Endocrine Profile: Hemoglobin A1c (HgbA1c), Glucose, fasting  Criteria: A1c <7%; fasting blood glucose  mg/dl    Readiness to Change : Fair  Level of Understanding : Fair  Anticipated Compliant : Fair

## 2025-01-31 NOTE — PATIENT INSTRUCTIONS
Consistency of meals is important!  Eat 3 meals per day with snacks.  Eat an hour after waking up and don't go more than 3-4 hours without eating.     - Pack 1 or 2 snacks to have at work from the snack list   Include a source of protein with all meals and snack such as meat, fish, nuts, peanut butter, yogurt, cottage cheese or eggs.    3.   Cut out regular soda pop and choose more sugar free beverages.    4.   Aim for 30 minutes of exercise such as walking on most days.

## 2025-02-01 LAB
ATRIAL RATE: 92 BPM
P AXIS: 54 DEGREES
P OFFSET: 201 MS
P ONSET: 140 MS
PR INTERVAL: 158 MS
Q ONSET: 219 MS
QRS COUNT: 15 BEATS
QRS DURATION: 76 MS
QT INTERVAL: 388 MS
QTC CALCULATION(BAZETT): 479 MS
QTC FREDERICIA: 447 MS
R AXIS: 41 DEGREES
T AXIS: 57 DEGREES
T OFFSET: 413 MS
VENTRICULAR RATE: 92 BPM

## 2025-02-01 NOTE — DISCHARGE INSTRUCTIONS
Follow-up with your primary care doctor.  Return to the emergency department if symptoms worsen or change.

## 2025-02-01 NOTE — ED PROVIDER NOTES
54-year-old male presents emergency department via EMS with report of hypoglycemia and altered level of consciousness.  Patient reportedly was watching TV when he slid down the couch onto the floor and was unconscious.  EMS was called and they found his blood glucose to be in the 20s.  Patient states he has had episodes similar to this in the past.  Reports the last time his blood sugar dropped was about a month ago.  No reported fevers, coughing, or congestion.  Patient denies any chest pain or significant difficulty breathing.  He does admit to feeling somewhat nauseous.  No vomiting.  Patient denies any headache or back pain.  States he does not believe he hit his head.  No dysuria, diarrhea, constipation, black or bloody stools.  Denies any recent changes in his blood sugar medications.  States that he did eat dinner as normal today. Chart review shows history of hypertension, asthma, diabetes, and anxiety.  No reported tobacco use, illicit drug use, or regular alcohol use.  Patient is on Trulicity and Actos for his blood sugar.      History provided by:  Patient and EMS personnel   used: No           ------------------------------------------------------------------------------------------------------------------------------------------    VS: As documented in the triage note and EMR flowsheet from this visit were reviewed.    Review of Systems  Constitutional: no fever, chills reports malaise  Eyes: no redness, discharge, pain  HENT: no sore throat, nose bleeds, congestion, rhinorrhea   Cardiovascular: no chest pain, leg edema, palpitations  Respiratory: no shortness of breath, cough, wheezing  GI: Admits to nausea no diarrhea, pain, vomiting, constipation, BRBPR, melena  : no dysuria, frequency, hematuria  Musculoskeletal: no neck pain, stiffness,  no joint deformity, swelling  Skin: no rash, erythema, wounds  Neurological: no headache, dizziness, lightheadedness, weakness, numbness, or  tingling reports altered level of consciousness  Psychiatric: no suicidal thoughts, confusion, agitation  Metabolic: no polyuria or polydipsia reports hypoglycemia  Hematologic: no increased bleeding or bruising  Immunology: No immunocompromise state    [facility]  Nursing notes reviewed and confirmed by me.  Chart review performed including medications, allergies, and medical, surgical, and family history  Visit Vitals  /72 (BP Location: Left arm, Patient Position: Sitting)   Pulse 97   Temp 36.3 °C (97.3 °F) (Temporal)   Resp 18     Physical Exam  Vitals and nursing note reviewed.   Constitutional:       General: He is not in acute distress.     Appearance: Normal appearance. He is not ill-appearing.   HENT:      Head: Normocephalic and atraumatic.      Right Ear: External ear normal.      Left Ear: External ear normal.      Nose: Nose normal. No congestion or rhinorrhea.      Mouth/Throat:      Mouth: Mucous membranes are moist.      Pharynx: No oropharyngeal exudate or posterior oropharyngeal erythema.   Eyes:      Extraocular Movements: Extraocular movements intact.      Conjunctiva/sclera: Conjunctivae normal.      Pupils: Pupils are equal, round, and reactive to light.   Cardiovascular:      Rate and Rhythm: Normal rate and regular rhythm.      Pulses: Normal pulses.      Heart sounds: Normal heart sounds.   Pulmonary:      Effort: Pulmonary effort is normal. No respiratory distress.      Breath sounds: Normal breath sounds. No stridor. No wheezing, rhonchi or rales.   Abdominal:      General: There is no distension.      Palpations: Abdomen is soft.      Tenderness: There is no abdominal tenderness. There is no guarding or rebound.   Musculoskeletal:         General: No swelling or deformity. Normal range of motion.      Cervical back: Normal range of motion and neck supple. No rigidity.      Right lower leg: No edema.      Left lower leg: No edema.      Comments: No calf tenderness    Skin:     General:  Skin is warm and dry.      Capillary Refill: Capillary refill takes less than 2 seconds.      Coloration: Skin is not jaundiced.      Findings: No rash.   Neurological:      General: No focal deficit present.      Mental Status: He is alert and oriented to person, place, and time.      Sensory: No sensory deficit.      Motor: No weakness.      Comments: NIH stroke scale is 0.  Cranial nerves II through XII grossly intact.   Psychiatric:         Mood and Affect: Mood normal.         Behavior: Behavior normal.        Past Medical History:   Diagnosis Date    Anxiety     Asthma     Diabetes mellitus (Multi)     Hypertension       History reviewed. No pertinent surgical history.   Social History     Socioeconomic History    Marital status:    Tobacco Use    Smoking status: Never    Smokeless tobacco: Never   Vaping Use    Vaping status: Never Used   Substance and Sexual Activity    Alcohol use: Never    Drug use: Never    Sexual activity: Never     Social Drivers of Health     Financial Resource Strain: Low Risk  (10/25/2024)    Overall Financial Resource Strain (CARDIA)     Difficulty of Paying Living Expenses: Not hard at all   Food Insecurity: No Food Insecurity (10/25/2024)    Hunger Vital Sign     Worried About Running Out of Food in the Last Year: Never true     Ran Out of Food in the Last Year: Never true   Transportation Needs: No Transportation Needs (10/25/2024)    PRAPARE - Transportation     Lack of Transportation (Medical): No     Lack of Transportation (Non-Medical): No   Physical Activity: Inactive (1/11/2024)    Exercise Vital Sign     Days of Exercise per Week: 0 days     Minutes of Exercise per Session: 0 min   Stress: No Stress Concern Present (1/11/2024)    [place] [employer] - Occupational Stress Questionnaire     Feeling of Stress : Not at all   Social Connections: Unknown (1/11/2024)    Social Connection and Isolation Panel [NHANES]     Frequency of Communication with  Friends and Family: Never     Frequency of Social Gatherings with Friends and Family: Once a week     Attends Jew Services: Never     Active Member of Clubs or Organizations: No     Attends Club or Organization Meetings: Never   Intimate Partner Violence: Not At Risk (10/25/2024)    Humiliation, Afraid, Rape, and Kick questionnaire     Fear of Current or Ex-Partner: No     Emotionally Abused: No     Physically Abused: No     Sexually Abused: No   Housing Stability: Low Risk  (10/25/2024)    Housing Stability Vital Sign     Unable to Pay for Housing in the Last Year: No     Number of Times Moved in the Last Year: 0     Homeless in the Last Year: No      ------------------------------------------------------------------------------------------------------------------------------------------  XR chest 1 view   Final Result   No evidence consolidating infiltrate or effusion.   Signed by Clayton Toledo MD         Labs Reviewed   CBC WITH AUTO DIFFERENTIAL - Abnormal       Result Value    WBC 11.0      nRBC 0.0      RBC 4.05 (*)     Hemoglobin 12.5 (*)     Hematocrit 36.0 (*)     MCV 89      MCH 30.9      MCHC 34.7      RDW 12.7      Platelets 214      Neutrophils % 90.8      Immature Granulocytes %, Automated 0.3      Lymphocytes % 4.4      Monocytes % 3.9      Eosinophils % 0.3      Basophils % 0.3      Neutrophils Absolute 10.00 (*)     Immature Granulocytes Absolute, Automated 0.03      Lymphocytes Absolute 0.48 (*)     Monocytes Absolute 0.43      Eosinophils Absolute 0.03      Basophils Absolute 0.03     COMPREHENSIVE METABOLIC PANEL - Abnormal    Glucose 66 (*)     Sodium 139      Potassium 3.8      Chloride 109 (*)     Bicarbonate 25      Anion Gap 9 (*)     Urea Nitrogen 23      Creatinine 1.12      eGFR 78      Calcium 9.0      Albumin 3.9      Alkaline Phosphatase 81      Total Protein 6.5      AST 13      Bilirubin, Total 0.2      ALT 13     POCT GLUCOSE - Abnormal    POCT Glucose 61 (*)    POCT GLUCOSE -  Abnormal    POCT Glucose 172 (*)    POCT GLUCOSE - Abnormal    POCT Glucose 218 (*)    POCT GLUCOSE - Abnormal    POCT Glucose 228 (*)    MAGNESIUM - Normal    Magnesium 1.84     TROPONIN I, HIGH SENSITIVITY - Normal    Troponin I, High Sensitivity 12      Narrative:     Less than 99th percentile of normal range cutoff-  Female and children under 18 years old <14 ng/L; Male <21 ng/L: Negative  Repeat testing should be performed if clinically indicated.     Female and children under 18 years old 14-50 ng/L; Male 21-50 ng/L:  Consistent with possible cardiac damage and possible increased clinical   risk. Serial measurements may help to assess extent of myocardial damage.     >50 ng/L: Consistent with cardiac damage, increased clinical risk and  myocardial infarction. Serial measurements may help assess extent of   myocardial damage.      NOTE: Children less than 1 year old may have higher baseline troponin   levels and results should be interpreted in conjunction with the overall   clinical context.     NOTE: Troponin I testing is performed using a different   testing methodology at [facility] than at other   [facility]. Direct result comparisons should only   be made within the same method.   POCT FINGERSTICK GLUCOSE   POCT FINGERSTICK GLUCOSE   POCT FINGERSTICK GLUCOSE        Medical Decision Making  EKG interpreted by ED physician: Normal sinus rhythm rate of 92.  NH, QRS, QTc intervals all within normal limits.  No significant ST elevations or depressions.  No significant Q waves.  Good R wave progression.  Normal axis.    54-year-old male emergency department for chief complaint of hypoglycemia and altered level of consciousness.  Patient significant other arrived shortly after him and did confirm that he did not fall or hit his head rather slid out of the couch chair.  On my exam patient is afebrile nontoxic.  He has no focal neurologic deficit.  A thorough workup is obtained given his  presenting symptoms.  CMP as well as point-of-care glucose confirms recurrent hypoglycemia despite treatment by EMS.  Patient given half amp of D50 and provided with food and drink.  Multiple repeat glucoses show that his glucose is stable and increasing appropriately.  Cardiac enzyme and EKG do not show acute ACS.  CMP does not show any significant kidney injury.  CBC does not show significant leukocytosis and shows mild anemia.  Patient does not have findings of sepsis.  Chest x-ray shows no acute cardiopulmonary process such as pneumonia, pleural effusion, or pulmonary edema.  After prolonged observation in the ED patient did not have any recurrence of his hypoglycemia.  He and family are comfortable returning home.  Advised follow-up with endocrinology as well as primary care.  Advised on checking blood glucose regularly.  Discussed reasons to return to the ED.       Diagnoses as of 02/01/25 0000   Hypoglycemia   Altered level of consciousness      1. Hypoglycemia        2. Altered level of consciousness           Procedures     This note was dictated using dragon software and may contain errors related to dictation interpretation errors.      Isaiah Ansari, DO  02/01/25 0000

## 2025-04-11 ENCOUNTER — NUTRITION (OUTPATIENT)
Dept: NUTRITION | Facility: HOSPITAL | Age: 55
End: 2025-04-11
Payer: MEDICARE

## 2025-04-11 VITALS — WEIGHT: 165.7 LBS | BODY MASS INDEX: 25.95 KG/M2

## 2025-04-11 DIAGNOSIS — E10.649 TYPE 1 DIABETES MELLITUS WITH HYPOGLYCEMIA AND WITHOUT COMA: Primary | ICD-10-CM

## 2025-04-11 PROCEDURE — 97803 MED NUTRITION INDIV SUBSEQ: CPT | Performed by: DIETITIAN, REGISTERED

## 2025-04-11 NOTE — PATIENT INSTRUCTIONS
Eats 3 meals per day and 1-2 snacks.  Start having breakfast like peanut butter on rye toast or Greek yogurt.  Avoid getting sugary drinks at [employer]- ask for sugar-free syrups and cold foam instead of whipped cream.

## 2025-04-11 NOTE — PROGRESS NOTES
Nutrition Assessment     Reason for Visit:  Carlyle Houser is a 54 y.o. male who presents for follow-up nutrition visit with dx of type I diabetes.     Anthropometrics:  Wt Readings from Last 10 Encounters:   04/11/25 75.2 kg (165 lb 11.2 oz)   01/09/25 75.3 kg (166 lb)   11/20/24 75.7 kg (166 lb 14.4 oz)   10/24/24 80.3 kg (177 lb)   09/02/24 85.2 kg (187 lb 13.3 oz)   03/26/24 99.3 kg (219 lb)   03/02/24 88.5 kg (195 lb)   02/29/24 90.7 kg (200 lb)   01/11/24 90.7 kg (200 lb)   01/08/24 89.2 kg (196 lb 9.6 oz)     Anthropometrics  Weight: 75.2 kg (165 lb 11.2 oz)  Weight Change  Weight History / % Weight Change: 30-40# weight loss this year  Significant Weight Loss: Yes  Interpretation of Weight Loss: >20% in 1 year    Body mass index is 25.95 kg/m².  Lab Results   Component Value Date    HGBA1C 9.3 (A) 04/08/2025        Meds: Trulicity, Actos  Food And Nutrient Intake:    Food and Nutrient History  Food and Nutrient History: Pt present for follow-up nutrition education in the context of Type I DM. Pt went to the ER once since last visit due to a low blood glucose.  He also reports that yesterday his blood sugar was down in the 30's and he ate some crackers.  Pt reports that his weight had decreased into the 150's.  Today it is fairly stable at 165lbs.  Pt has been skipping breakfast.  He and is wife are currently getting their kitchen renovated but still have a microwave.  Discussed possible breakfasts he may have that are quick and have some protein.  They have been getting a lot of fast food from [employer] lately.  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  Will meet next visit in the cafeteria for a food tour.  Energy Intake: Fair 50-75 %  Food Intolerance: no milk or cheese     Food Intake  Meal 1: skip usually- cream of wheat; peanut butter on rye bread;  Meal 2: 11:00- chicken wings and soup;  Snacks : Frito twists;  Meal 3: dinner: chicken, veggies, pork, leftovers;  italian sausage, potato salad, [employer]     Physical Activities:  Physical Activity  Type of Physical Activity: Not exercising much during the winter months     Nutrition Focused Physical Exam:  Subcutaneous Fat Loss  Orbital Fat Pads: Mild-Moderate (slight dark circles and slight hollowing)  Buccal Fat Pads: Mild-Moderate (flat cheeks, minimal bounce)  Triceps: Defer  Muscle Wasting  Temporalis: Mild-Moderate (slight depression)  Pectoralis (Clavicular Region): Mild-Moderate (some protrusion of clavicle)  Deltoid/Trapezius: Defer  Interosseous: Well nourished (muscle bulges)  Trapezius/Infraspinatus/Supraspinatus (Scapular Region): Defer  Quadriceps: Well nourished (well developed, well rounded)  Gastrocnemius: Well nourished (well developed bulbous muscle)      Energy Needs  Estimated Energy Needs  Total Energy Estimated Needs in 24 hours (kCal): 2250 kCal  Method for Estimating Needs: 30 kcals/kg  Estimated Protein Needs  Total Protein Estimated Needs in 24 Hours (g): 75 g  Method for Estimating 24 Hour Protein Needs: 1.0g/kg    Nutrition Diagnosis   Malnutrition Diagnosis  Patient has Malnutrition Diagnosis: Yes  Diagnosis Status: Active  Malnutrition Diagnosis: Moderate malnutrition related to starvation  As Evidenced by: >20% weight loss over the past year  Nutrition Diagnosis  Patient has Nutrition Diagnosis: Yes  Diagnosis Status (1): Active  Nutrition Diagnosis 1: Undesirable food choices  Additional Nutrition Diagnosis: Diagnosis 2  Diagnosis Status (2): Active  Nutrition Diagnosis 2: Unintended weight loss  Related to (2): confusion regarding diet  As Evidenced by (2): pt interview; diet recall    Nutrition Interventions/Recommendations   Food and Nutrition Delivery  Goals: 45 g carb per meal; 3 meals with 1-2 snacks per day; ~2200 daily kcal intake; ensure adequate protein sources with each meal and snack  Medical Food Supplement: Other, Specify:  Goals: Glucerna daily 1-2x  Nutrition Education  Nutrition  Education Content: Content related nutrition education, Education on nutrition's influence on health, Physical activity guidance  Goals: Instructed on consistent carbohydrate intake, blood sugar goals, exercise, proper portion sizes, label reading, and general healthful nutrition. Instructed on benefits of wt loss with diabetes and heart health. Both verbal and written education provided in the one-on-one setting.Pt verbalized understanding of information provided. All questions answered to pt satisfaction throughout visit.  Nutrition Counseling  Theoretical Basis/Approach: Nutrition counseling based on health belief model  Nutrition Counseling Strategies : Nutrition counseling based on goal setting strategy, Nutrition counseling based on motivational interviewing strategy, Nutrition counseling based on problem solving strategy  Goals: consistent meals- no skipping    Patient Instructions    Eats 3 meals per day and 1-2 snacks.  Start having breakfast like peanut butter on rye toast or Greek yogurt.  Avoid getting sugary drinks at [facility]- ask for sugar-free syrups and cold foam instead of whipped cream.     Nutrition Monitoring and Evaluation   Food and Nutrient Intake  Monitoring and Evaluation Plan: Meal/snack pattern, Protein intake, Carbohydrate intake, Fiber intake  Criteria: Follow plate method when planning meals (1/2 plate non-starchy vegetables, 1/4 plate lean protein, 1/4 plate carbohydrates).Aim for at least 3 meals per day - be sure to include protein with each meal and snack  Criteria: Choose lean protein sources including chicken, turkey, seafood, and eggs.  Criteria: Follow meal pattern of 30 g carb breakfast, 15g carb snack, 45 g carb lunch, 15 g carb snack, 45 g carb dinner, 15 g carb bedtime snack. Limit added sugar to less than 25 g per day  Criteria: Increase fiber from fruits, vegetables, whole grains, and beans/lentils  Knowledge Belief Attitude Determination   Monitoring and Evaluation  Plan: Physical activity  Criteria: Continue regular physical activity including strength training as medically appropriate per AHA guidelines  Biochemical Data, Medical Tests and Procedures  Monitoring and Evaluation Plan: Glucose/endocrine profile  Criteria: Continue checking blood sugar per physician recommendation - Target Range: fasting <95 gm/dL, one hour after a meal <140 mg/dL, two hours after a meal <120 mg/dL    Readiness to Change : Fair  Level of Understanding : Fair  Anticipated Compliant : Fair

## 2025-05-06 ENCOUNTER — APPOINTMENT (OUTPATIENT)
Dept: RADIOLOGY | Facility: HOSPITAL | Age: 55
End: 2025-05-06
Payer: MEDICARE

## 2025-05-06 ENCOUNTER — HOSPITAL ENCOUNTER (EMERGENCY)
Facility: HOSPITAL | Age: 55
Discharge: HOME | End: 2025-05-06
Attending: STUDENT IN AN ORGANIZED HEALTH CARE EDUCATION/TRAINING PROGRAM
Payer: MEDICARE

## 2025-05-06 VITALS
OXYGEN SATURATION: 99 % | WEIGHT: 160 LBS | RESPIRATION RATE: 17 BRPM | DIASTOLIC BLOOD PRESSURE: 85 MMHG | TEMPERATURE: 98.1 F | BODY MASS INDEX: 25.11 KG/M2 | SYSTOLIC BLOOD PRESSURE: 166 MMHG | HEART RATE: 86 BPM | HEIGHT: 67 IN

## 2025-05-06 DIAGNOSIS — L03.031 CELLULITIS OF RIGHT TOE: Primary | ICD-10-CM

## 2025-05-06 LAB
ALBUMIN SERPL BCP-MCNC: 3.9 G/DL (ref 3.4–5)
ALP SERPL-CCNC: 90 U/L (ref 33–120)
ALT SERPL W P-5'-P-CCNC: 9 U/L (ref 10–52)
ANION GAP SERPL CALC-SCNC: 13 MMOL/L (ref 10–20)
AST SERPL W P-5'-P-CCNC: 12 U/L (ref 9–39)
BASOPHILS # BLD AUTO: 0.02 X10*3/UL (ref 0–0.1)
BASOPHILS NFR BLD AUTO: 0.4 %
BILIRUB SERPL-MCNC: 0.4 MG/DL (ref 0–1.2)
BUN SERPL-MCNC: 26 MG/DL (ref 6–23)
CALCIUM SERPL-MCNC: 9 MG/DL (ref 8.6–10.3)
CHLORIDE SERPL-SCNC: 102 MMOL/L (ref 98–107)
CO2 SERPL-SCNC: 23 MMOL/L (ref 21–32)
CREAT SERPL-MCNC: 1.29 MG/DL (ref 0.5–1.3)
EGFRCR SERPLBLD CKD-EPI 2021: 65 ML/MIN/1.73M*2
EOSINOPHIL # BLD AUTO: 0.15 X10*3/UL (ref 0–0.7)
EOSINOPHIL NFR BLD AUTO: 2.7 %
ERYTHROCYTE [DISTWIDTH] IN BLOOD BY AUTOMATED COUNT: 13.1 % (ref 11.5–14.5)
GLUCOSE SERPL-MCNC: 342 MG/DL (ref 74–99)
HCT VFR BLD AUTO: 34.6 % (ref 41–52)
HGB BLD-MCNC: 12 G/DL (ref 13.5–17.5)
IMM GRANULOCYTES # BLD AUTO: 0.02 X10*3/UL (ref 0–0.7)
IMM GRANULOCYTES NFR BLD AUTO: 0.4 % (ref 0–0.9)
LACTATE SERPL-SCNC: 1.3 MMOL/L (ref 0.4–2)
LYMPHOCYTES # BLD AUTO: 0.99 X10*3/UL (ref 1.2–4.8)
LYMPHOCYTES NFR BLD AUTO: 17.6 %
MCH RBC QN AUTO: 31 PG (ref 26–34)
MCHC RBC AUTO-ENTMCNC: 34.7 G/DL (ref 32–36)
MCV RBC AUTO: 89 FL (ref 80–100)
MONOCYTES # BLD AUTO: 0.42 X10*3/UL (ref 0.1–1)
MONOCYTES NFR BLD AUTO: 7.4 %
NEUTROPHILS # BLD AUTO: 4.04 X10*3/UL (ref 1.2–7.7)
NEUTROPHILS NFR BLD AUTO: 71.5 %
NRBC BLD-RTO: 0 /100 WBCS (ref 0–0)
PLATELET # BLD AUTO: 191 X10*3/UL (ref 150–450)
POTASSIUM SERPL-SCNC: 4.9 MMOL/L (ref 3.5–5.3)
PROT SERPL-MCNC: 6.7 G/DL (ref 6.4–8.2)
RBC # BLD AUTO: 3.87 X10*6/UL (ref 4.5–5.9)
SODIUM SERPL-SCNC: 133 MMOL/L (ref 136–145)
WBC # BLD AUTO: 5.6 X10*3/UL (ref 4.4–11.3)

## 2025-05-06 PROCEDURE — 36415 COLL VENOUS BLD VENIPUNCTURE: CPT | Performed by: STUDENT IN AN ORGANIZED HEALTH CARE EDUCATION/TRAINING PROGRAM

## 2025-05-06 PROCEDURE — 73660 X-RAY EXAM OF TOE(S): CPT | Mod: RT

## 2025-05-06 PROCEDURE — 80053 COMPREHEN METABOLIC PANEL: CPT | Performed by: STUDENT IN AN ORGANIZED HEALTH CARE EDUCATION/TRAINING PROGRAM

## 2025-05-06 PROCEDURE — 87040 BLOOD CULTURE FOR BACTERIA: CPT | Mod: ELYLAB | Performed by: STUDENT IN AN ORGANIZED HEALTH CARE EDUCATION/TRAINING PROGRAM

## 2025-05-06 PROCEDURE — 85025 COMPLETE CBC W/AUTO DIFF WBC: CPT | Performed by: STUDENT IN AN ORGANIZED HEALTH CARE EDUCATION/TRAINING PROGRAM

## 2025-05-06 PROCEDURE — 2500000001 HC RX 250 WO HCPCS SELF ADMINISTERED DRUGS (ALT 637 FOR MEDICARE OP): Performed by: STUDENT IN AN ORGANIZED HEALTH CARE EDUCATION/TRAINING PROGRAM

## 2025-05-06 PROCEDURE — 96361 HYDRATE IV INFUSION ADD-ON: CPT

## 2025-05-06 PROCEDURE — 83605 ASSAY OF LACTIC ACID: CPT | Performed by: STUDENT IN AN ORGANIZED HEALTH CARE EDUCATION/TRAINING PROGRAM

## 2025-05-06 PROCEDURE — 2500000004 HC RX 250 GENERAL PHARMACY W/ HCPCS (ALT 636 FOR OP/ED): Mod: JZ | Performed by: STUDENT IN AN ORGANIZED HEALTH CARE EDUCATION/TRAINING PROGRAM

## 2025-05-06 PROCEDURE — 99284 EMERGENCY DEPT VISIT MOD MDM: CPT | Mod: 25 | Performed by: STUDENT IN AN ORGANIZED HEALTH CARE EDUCATION/TRAINING PROGRAM

## 2025-05-06 PROCEDURE — 73660 X-RAY EXAM OF TOE(S): CPT | Mod: RIGHT SIDE | Performed by: STUDENT IN AN ORGANIZED HEALTH CARE EDUCATION/TRAINING PROGRAM

## 2025-05-06 PROCEDURE — 96374 THER/PROPH/DIAG INJ IV PUSH: CPT

## 2025-05-06 RX ORDER — CEPHALEXIN 500 MG/1
500 CAPSULE ORAL ONCE
Status: COMPLETED | OUTPATIENT
Start: 2025-05-06 | End: 2025-05-06

## 2025-05-06 RX ORDER — KETOROLAC TROMETHAMINE 30 MG/ML
15 INJECTION, SOLUTION INTRAMUSCULAR; INTRAVENOUS ONCE
Status: COMPLETED | OUTPATIENT
Start: 2025-05-06 | End: 2025-05-06

## 2025-05-06 RX ORDER — CEPHALEXIN 500 MG/1
500 CAPSULE ORAL 4 TIMES DAILY
Qty: 40 CAPSULE | Refills: 0 | Status: SHIPPED | OUTPATIENT
Start: 2025-05-06 | End: 2025-05-16

## 2025-05-06 RX ORDER — DOXYCYCLINE HYCLATE 100 MG
100 TABLET ORAL ONCE
Status: COMPLETED | OUTPATIENT
Start: 2025-05-06 | End: 2025-05-06

## 2025-05-06 RX ORDER — DOXYCYCLINE 100 MG/1
100 CAPSULE ORAL 2 TIMES DAILY
Qty: 20 CAPSULE | Refills: 0 | Status: SHIPPED | OUTPATIENT
Start: 2025-05-06 | End: 2025-05-16

## 2025-05-06 RX ORDER — ACETAMINOPHEN 325 MG/1
975 TABLET ORAL ONCE
Status: COMPLETED | OUTPATIENT
Start: 2025-05-06 | End: 2025-05-06

## 2025-05-06 RX ADMIN — DOXYCYCLINE HYCLATE 100 MG: 100 TABLET, FILM COATED ORAL at 22:45

## 2025-05-06 RX ADMIN — CEPHALEXIN 500 MG: 500 CAPSULE ORAL at 22:45

## 2025-05-06 RX ADMIN — ACETAMINOPHEN 975 MG: 325 TABLET ORAL at 21:15

## 2025-05-06 RX ADMIN — SODIUM CHLORIDE, SODIUM LACTATE, POTASSIUM CHLORIDE, AND CALCIUM CHLORIDE 1000 ML: .6; .31; .03; .02 INJECTION, SOLUTION INTRAVENOUS at 21:16

## 2025-05-06 RX ADMIN — KETOROLAC TROMETHAMINE 15 MG: 30 INJECTION, SOLUTION INTRAMUSCULAR at 21:16

## 2025-05-06 ASSESSMENT — PAIN - FUNCTIONAL ASSESSMENT
PAIN_FUNCTIONAL_ASSESSMENT: 0-10

## 2025-05-06 ASSESSMENT — PAIN SCALES - GENERAL
PAINLEVEL_OUTOF10: 0 - NO PAIN
PAINLEVEL_OUTOF10: 3
PAINLEVEL_OUTOF10: 7

## 2025-05-06 ASSESSMENT — PAIN DESCRIPTION - ORIENTATION: ORIENTATION: RIGHT

## 2025-05-06 ASSESSMENT — PAIN DESCRIPTION - PROGRESSION: CLINICAL_PROGRESSION: GRADUALLY IMPROVING

## 2025-05-06 ASSESSMENT — COLUMBIA-SUICIDE SEVERITY RATING SCALE - C-SSRS
6. HAVE YOU EVER DONE ANYTHING, STARTED TO DO ANYTHING, OR PREPARED TO DO ANYTHING TO END YOUR LIFE?: LONG TIME AGO
2. HAVE YOU ACTUALLY HAD ANY THOUGHTS OF KILLING YOURSELF?: NO
1. IN THE PAST MONTH, HAVE YOU WISHED YOU WERE DEAD OR WISHED YOU COULD GO TO SLEEP AND NOT WAKE UP?: NO
6. HAVE YOU EVER DONE ANYTHING, STARTED TO DO ANYTHING, OR PREPARED TO DO ANYTHING TO END YOUR LIFE?: NO
6. HAVE YOU EVER DONE ANYTHING, STARTED TO DO ANYTHING, OR PREPARED TO DO ANYTHING TO END YOUR LIFE?: YES

## 2025-05-06 ASSESSMENT — PAIN DESCRIPTION - LOCATION: LOCATION: TOE (COMMENT WHICH ONE)

## 2025-05-07 NOTE — DISCHARGE INSTRUCTIONS
Please call your podiatrist first thing in the morning to schedule follow-up appointment.  Please take the antibiotics prescribed to you as directed.  Please return to nearest emergency department if you experiencing fevers, chills, worsening pain, redness, swelling, purulent drainage, chest pain, difficulty breathing, passing out, or other signs and symptoms that you find concerning.  These documents have a lot of useful information! Please read them, so you know what to expect, what you can do for yourself at home, and also to know concerning signs warrant a return to the Emergency Department for additional evaluation.  You are welcome back any time. Thank you for entrusting your care to us, I hope we made your visit as pleasant as possible. Wishing you well!    Dr. Pollock

## 2025-05-07 NOTE — ED PROVIDER NOTES
HPI   Chief Complaint   Patient presents with    Toe Injury     I hurt my toe and I think it's infected. I'm diabetic.       Patient is a 54-year-old male with history of diabetes presents to the emergency department for complaints of right great toe infection.  Patient states it started turning red and draining pus today.  He denies any falls or traumatic injuries.  Patient states he has had previous infections requiring amputations.  He denies fevers, chills, nausea, vomiting, diarrhea.      History provided by:  Patient          Patient History   Medical History[1]  Surgical History[2]  Family History[3]  Social History[4]    Physical Exam   ED Triage Vitals [05/06/25 1951]   Temperature Heart Rate Respirations BP   36.7 °C (98.1 °F) (!) 102 18 (!) 191/93      Pulse Ox Temp Source Heart Rate Source Patient Position   99 % Temporal Monitor Sitting      BP Location FiO2 (%)     Right arm --       Physical Exam  Vitals and nursing note reviewed.   Constitutional:       General: He is not in acute distress.     Appearance: Normal appearance. He is not ill-appearing, toxic-appearing or diaphoretic.   HENT:      Head: Normocephalic and atraumatic.      Nose: Nose normal.      Mouth/Throat:      Mouth: Mucous membranes are moist.      Pharynx: No oropharyngeal exudate or posterior oropharyngeal erythema.   Eyes:      General: No scleral icterus.     Extraocular Movements: Extraocular movements intact.      Pupils: Pupils are equal, round, and reactive to light.   Cardiovascular:      Rate and Rhythm: Normal rate and regular rhythm.      Pulses: Normal pulses.      Heart sounds: Normal heart sounds. No murmur heard.     No friction rub. No gallop.   Pulmonary:      Effort: Pulmonary effort is normal. No respiratory distress.      Breath sounds: Normal breath sounds. No stridor. No wheezing, rhonchi or rales.   Chest:      Chest wall: No tenderness.   Abdominal:      General: Abdomen is flat. There is no distension.       Palpations: Abdomen is soft. There is no mass.      Tenderness: There is no abdominal tenderness. There is no guarding.      Hernia: No hernia is present.   Musculoskeletal:         General: No swelling, tenderness, deformity or signs of injury. Normal range of motion.      Cervical back: Normal range of motion and neck supple. No rigidity.   Skin:     General: Skin is warm and dry.      Capillary Refill: Capillary refill takes less than 2 seconds.      Coloration: Skin is not jaundiced or pale.      Findings: Erythema present. No bruising, lesion or rash.          Neurological:      General: No focal deficit present.      Mental Status: He is alert and oriented to person, place, and time. Mental status is at baseline.   Psychiatric:         Mood and Affect: Mood normal.         Behavior: Behavior normal.           ED Course & MDM   Diagnoses as of 05/06/25 2257   Cellulitis of right toe                 No data recorded     [place] Coma Scale Score: 15 (05/06/25 1951 : Leah Akers RN)                           Medical Decision Making  Patient is a 54-year-old male presenting to the emergency department for complaints of concern for infected right great toe.  Patient is a diabetic.  Patient states that he does have a podiatrist that he regularly follows with.  Patient was initially tachycardic on arrival which I suspect is secondary to pain.  Patient's pain was treated and his heart rate improved.  Metabolic panel does show hyperglycemia and IV fluids were ordered.  Lactate level was normal.  CBC without any leukocytosis.  Patient does not have any findings for sepsis.  X-ray was negative for osseous abnormalities.  Keflex and doxycycline were ordered for treatment of cellulitis.  Patient was given prescriptions for Keflex and Doxy.  I offered set up patient with the podiatry appointment which she declined stating that he has a specific podiatrist that he would like to see and states he will call and follow-up  with them in the morning.  Patient verbalized understanding of all structures given to him.  Patient was appreciative care and agreeable to discharge.    Amount and/or Complexity of Data Reviewed  Labs: ordered. Decision-making details documented in ED Course.  Radiology: ordered. Decision-making details documented in ED Course.      Labs Reviewed   CBC WITH AUTO DIFFERENTIAL - Abnormal       Result Value    WBC 5.6      nRBC 0.0      RBC 3.87 (*)     Hemoglobin 12.0 (*)     Hematocrit 34.6 (*)     MCV 89      MCH 31.0      MCHC 34.7      RDW 13.1      Platelets 191      Neutrophils % 71.5      Immature Granulocytes %, Automated 0.4      Lymphocytes % 17.6      Monocytes % 7.4      Eosinophils % 2.7      Basophils % 0.4      Neutrophils Absolute 4.04      Immature Granulocytes Absolute, Automated 0.02      Lymphocytes Absolute 0.99 (*)     Monocytes Absolute 0.42      Eosinophils Absolute 0.15      Basophils Absolute 0.02     COMPREHENSIVE METABOLIC PANEL - Abnormal    Glucose 342 (*)     Sodium 133 (*)     Potassium 4.9      Chloride 102      Bicarbonate 23      Anion Gap 13      Urea Nitrogen 26 (*)     Creatinine 1.29      eGFR 65      Calcium 9.0      Albumin 3.9      Alkaline Phosphatase 90      Total Protein 6.7      AST 12      Bilirubin, Total 0.4      ALT 9 (*)    LACTATE - Normal    Lactate 1.3      Narrative:     Venipuncture immediately after or during the administration of Metamizole may lead to falsely low results. Testing should be performed immediately prior to Metamizole dosing.   BLOOD CULTURE   BLOOD CULTURE     XR toe right 2+ views   Final Result   Mild 1st MTP joint osteoarthrosis.        No acute osseous abnormality of the great toe. No evidence of erosive   gout.        MACRO:   None.        Signed by: Vadim Gallardo 5/6/2025 8:54 PM   Dictation workstation:   [id number]            Procedure  Procedures       [1]   Past Medical History:  Diagnosis Date    Anxiety     Asthma     Diabetes  mellitus (Multi)     Hypertension    [2] No past surgical history on file.  [3] No family history on file.  [4]   Social History  Tobacco Use    Smoking status: Never    Smokeless tobacco: Never   Vaping Use    Vaping status: Never Used   Substance Use Topics    Alcohol use: Never    Drug use: Never        Janak Pollock DO  05/06/25 [id number]

## 2025-05-11 LAB
BACTERIA BLD CULT: NORMAL
BACTERIA BLD CULT: NORMAL

## 2025-05-14 ENCOUNTER — NUTRITION (OUTPATIENT)
Dept: NUTRITION | Facility: HOSPITAL | Age: 55
End: 2025-05-14
Payer: MEDICARE

## 2025-05-14 VITALS — BODY MASS INDEX: 25.63 KG/M2 | WEIGHT: 162.4 LBS

## 2025-05-14 DIAGNOSIS — E16.2 HYPOGLYCEMIA: Primary | ICD-10-CM

## 2025-05-14 PROCEDURE — 97802 MEDICAL NUTRITION INDIV IN: CPT | Performed by: DIETITIAN, REGISTERED

## 2025-05-14 NOTE — PATIENT INSTRUCTIONS
Consistency of meals is important!  Eat 3 meals per day with snacks.  Eat an hour after waking up and don't go more than 3-4 hours without eating.    The Healthy Plate style of eating can be a helpful tool for incorporating healthy balanced meals in appropriate portions. (Healthy Plate: Start with a 9-inch diameter plate. Fill 1/2 the plate with non-starchy vegetables, 1/4 of the plate with whole grains or starchy vegetables, and 1/4 of the plate with a lean source of protein.   Include a source of protein with all meals and snack such as meat, fish, nuts, peanut butter, yogurt, cottage cheese or eggs.    -Keep high protein snacks on hand at home- see snack handout.  5.  Maintain your weight- Consider taking a Glucerna Shake daily.  6.  Drink 64 oz of water other non-caloric beverage.  7.  Aim for 30 minutes of exercise such as walking on most days.

## 2025-05-14 NOTE — PROGRESS NOTES
Nutrition Assessment     Reason for Visit:  Carlyle Houser is a 54 y.o. male who presents for follow-up nutrition counseling.   Anthropometrics:  Wt Readings from Last 10 Encounters:   05/14/25 73.7 kg (162 lb 6.4 oz)   05/06/25 72.6 kg (160 lb)   04/11/25 75.2 kg (165 lb 11.2 oz)   01/09/25 75.3 kg (166 lb)   11/20/24 75.7 kg (166 lb 14.4 oz)   10/24/24 80.3 kg (177 lb)   09/02/24 85.2 kg (187 lb 13.3 oz)   03/26/24 99.3 kg (219 lb)   03/02/24 88.5 kg (195 lb)   02/29/24 90.7 kg (200 lb)       Anthropometrics  Weight: 73.7 kg (162 lb 6.4 oz)  Body mass index is 25.63 kg/m².    Lab Results   Component Value Date    HGBA1C 9.3 (A) 04/08/2025     Food And Nutrient Intake:  Food and Nutrient History  Food and Nutrient History: Pt present for follow-up nutrition education in the context of Type I DM.  Met with pt and his wife to take a food tour today in the hospital cafeteria where they voluteer and eat lunch.  Pt states that he has not had any problems with his blood sugars recently but was hospitalized for a sore on his toe.  His weight increased by 2# which is desired as he has been losing.  Pt is trying to focus on eating more protein.   Encouraged pt to reduce his intake of sugary beverages as he often gets sugary [employer] drinks with whipped cream.  Provided pt with alternative options when at [employer].     Food Intake  Meal 1: skip usually- cream of wheat; peanut butter on rye bread;  High protein yogurt  Meal 2: 11:00- chicken wings and soup;  Meal 3: dinner: chicken, veggies, pork, leftovers; italian sausage, potato salad, Angel[employer]  Snacks: popcorn  Fluid Intake: Koolaid, Propel , water, Diet Pepsi, milk x 2; sugar free coffee with splends       Physical Activities:  Physical Activity  Type of Physical Activity: Contemplating getting a gym membership     Nutrition Focused Physical Exam:  Subcutaneous Fat Loss  Defer Subcutaneous Fat Loss Assessment: Defer all  Defer All Reason: no malnutrition suspected  Muscle  Wasting  Defer Muscle Wasting Assessment: Defer all  Defer All Reason: no malnutrition suspected    Energy Needs  Estimated Energy Needs  Total Energy Estimated Needs in 24 hours (kCal): 2250 kCal  Method for Estimating Needs: 30 kcals/kg  Estimated Protein Needs  Total Protein Estimated Needs in 24 Hours (g): 75 g  Method for Estimating 24 Hour Protein Needs: 1.0g/kg    Nutrition Diagnosis   Malnutrition Diagnosis  Patient has Malnutrition Diagnosis: No  Nutrition Diagnosis  Patient has Nutrition Diagnosis: Yes  Diagnosis Status (1): Active  Nutrition Diagnosis 1: Undesirable food choices  Related to (1): type I diabetes  As Evidenced by (1): pt report; diet recall  Additional Nutrition Diagnosis: Diagnosis 2  Diagnosis Status (2): Resolved    Nutrition Interventions/Recommendations   Nutrition Prescription  Individualized Nutrition Prescription Provided for : Oral nutrition  Individualized Nutrition Prescription Provided for : Consistent Carbohydrate Intake  Individualized Nutrition Prescription Provided for : Consistent Carbohydrate Intake  Food and Nutrition Delivery  Goals: 45 g carb per meal; 3 meals with 1-2 snacks per day; ~2200 daily kcal intake; ensure adequate protein sources with each meal and snack  Medical Food Supplement: Other, Specify:  Goals: Glucerna daily 1-2x  Nutrition Education  Nutrition Education Content: Content related nutrition education, Education on nutrition's influence on health, Physical activity guidance  Goals: Instructed on consistent carbohydrate intake, blood sugar goals, exercise, proper portion sizes, label reading, and general healthful nutrition. Instructed on benefits of wt loss with diabetes and heart health. Both verbal and written education provided in the one-on-one setting.Pt verbalized understanding of information provided. All questions answered to pt satisfaction throughout visit.  Nutrition Counseling  Theoretical Basis/Approach: Nutrition counseling based on  health belief model  Nutrition Counseling Strategies : Nutrition counseling based on goal setting strategy, Nutrition counseling based on motivational interviewing strategy, Nutrition counseling based on problem solving strategy  Goals: consistent meals- no skipping    Patient Instructions   Consistency of meals is important!  Eat 3 meals per day with snacks.  Eat an hour after waking up and don't go more than 3-4 hours without eating.    The Healthy Plate style of eating can be a helpful tool for incorporating healthy balanced meals in appropriate portions. (Healthy Plate: Start with a 9-inch diameter plate. Fill 1/2 the plate with non-starchy vegetables, 1/4 of the plate with whole grains or starchy vegetables, and 1/4 of the plate with a lean source of protein.   Include a source of protein with all meals and snack such as meat, fish, nuts, peanut butter, yogurt, cottage cheese or eggs.    -Keep high protein snacks on hand at home- see snack handout.  5.  Maintain your weight- Consider taking a Glucerna Shake daily.  6.  Drink 64 oz of water other non-caloric beverage.  7.  Aim for 30 minutes of exercise such as walking on most days.        Nutrition Monitoring and Evaluation   Food and Nutrient Intake  Monitoring and Evaluation Plan: Meal/snack pattern, Protein intake, Carbohydrate intake, Fiber intake  Criteria: consume 2200 kcal/day  Criteria: aim for 64 oz of water daily, limit sugar sweetened beveraged  Criteria: 3 meals per day with 1-2 snacks between meals  Criteria: ensure adequate protein at each meal and snack ~ 30 g/meal  Estimated carbohydrate intake: Estimated carbohydrate intake  Criteria: 45 g carb per meal  Criteria: ensure adequate fiber is present at meals - focus on 1/2 plate nonstarchy vegetables and choosing whole grain foods when able  Knowledge Belief Attitude Determination   Monitoring and Evaluation Plan: Physical activity  Criteria: Encouraged regular physical activity including strength  training as medically appropriate per AHA guidelines  Anthropometric measurements  Monitoring and Evaluation Plan: Weight change, Weight  Criteria: stable  Biochemical Data, Medical Tests and Procedures  Monitoring and Evaluation Plan: Glucose/endocrine profile  Glucose/Endocrine Profile: Hemoglobin A1c (HgbA1c), Glucose, fasting  Criteria: A1c <7%; fasting blood glucose  mg/dl    Readiness to Change : Fair  Level of Understanding : Fair  Anticipated Compliant : Fair

## 2025-06-07 ENCOUNTER — HOSPITAL ENCOUNTER (EMERGENCY)
Facility: HOSPITAL | Age: 55
Discharge: HOME | End: 2025-06-08
Attending: STUDENT IN AN ORGANIZED HEALTH CARE EDUCATION/TRAINING PROGRAM
Payer: MEDICARE

## 2025-06-07 DIAGNOSIS — E16.2 HYPOGLYCEMIA: Primary | ICD-10-CM

## 2025-06-07 PROBLEM — L97.409: Status: ACTIVE | Noted: 2018-11-15

## 2025-06-07 PROBLEM — E08.621: Status: ACTIVE | Noted: 2018-11-15

## 2025-06-07 PROBLEM — Z89.422: Status: ACTIVE | Noted: 2020-06-08

## 2025-06-07 PROBLEM — M86.179: Status: ACTIVE | Noted: 2025-06-07

## 2025-06-07 PROBLEM — F70 MILD INTELLECTUAL DISABILITIES: Status: ACTIVE | Noted: 2023-09-18

## 2025-06-07 PROBLEM — E78.5 HYPERLIPIDEMIA WITH TARGET LOW DENSITY LIPOPROTEIN (LDL) CHOLESTEROL LESS THAN 70 MG/DL: Status: ACTIVE | Noted: 2025-06-07

## 2025-06-07 LAB
ANION GAP BLDV CALCULATED.4IONS-SCNC: 11 MMOL/L (ref 10–25)
BASE EXCESS BLDV CALC-SCNC: -1.1 MMOL/L (ref -2–3)
BASOPHILS # BLD AUTO: 0.03 X10*3/UL (ref 0–0.1)
BASOPHILS NFR BLD AUTO: 0.3 %
BODY TEMPERATURE: ABNORMAL
CA-I BLDV-SCNC: 1.3 MMOL/L (ref 1.1–1.33)
CHLORIDE BLDV-SCNC: 105 MMOL/L (ref 98–107)
EOSINOPHIL # BLD AUTO: 0.08 X10*3/UL (ref 0–0.7)
EOSINOPHIL NFR BLD AUTO: 0.9 %
ERYTHROCYTE [DISTWIDTH] IN BLOOD BY AUTOMATED COUNT: 13.1 % (ref 11.5–14.5)
GLUCOSE BLD MANUAL STRIP-MCNC: 111 MG/DL (ref 74–99)
GLUCOSE BLD MANUAL STRIP-MCNC: 27 MG/DL (ref 74–99)
GLUCOSE BLD MANUAL STRIP-MCNC: 97 MG/DL (ref 74–99)
GLUCOSE BLDV-MCNC: 66 MG/DL (ref 74–99)
HCO3 BLDV-SCNC: 23.5 MMOL/L (ref 22–26)
HCT VFR BLD AUTO: 34.2 % (ref 41–52)
HCT VFR BLD EST: 37 % (ref 41–52)
HGB BLD-MCNC: 11.8 G/DL (ref 13.5–17.5)
HGB BLDV-MCNC: 12.2 G/DL (ref 13.5–17.5)
IMM GRANULOCYTES # BLD AUTO: 0.01 X10*3/UL (ref 0–0.7)
IMM GRANULOCYTES NFR BLD AUTO: 0.1 % (ref 0–0.9)
INHALED O2 CONCENTRATION: 21 %
LACTATE BLDV-SCNC: 2.9 MMOL/L (ref 0.4–2)
LYMPHOCYTES # BLD AUTO: 0.62 X10*3/UL (ref 1.2–4.8)
LYMPHOCYTES NFR BLD AUTO: 6.9 %
MCH RBC QN AUTO: 30.9 PG (ref 26–34)
MCHC RBC AUTO-ENTMCNC: 34.5 G/DL (ref 32–36)
MCV RBC AUTO: 90 FL (ref 80–100)
MONOCYTES # BLD AUTO: 0.61 X10*3/UL (ref 0.1–1)
MONOCYTES NFR BLD AUTO: 6.8 %
NEUTROPHILS # BLD AUTO: 7.63 X10*3/UL (ref 1.2–7.7)
NEUTROPHILS NFR BLD AUTO: 85 %
NRBC BLD-RTO: 0 /100 WBCS (ref 0–0)
OXYHGB MFR BLDV: 71.5 % (ref 45–75)
PCO2 BLDV: 38 MM HG (ref 41–51)
PH BLDV: 7.4 PH (ref 7.33–7.43)
PLATELET # BLD AUTO: 200 X10*3/UL (ref 150–450)
PO2 BLDV: 38 MM HG (ref 35–45)
POTASSIUM BLDV-SCNC: 3.4 MMOL/L (ref 3.5–5.3)
RBC # BLD AUTO: 3.82 X10*6/UL (ref 4.5–5.9)
SAO2 % BLDV: 73 % (ref 45–75)
SODIUM BLDV-SCNC: 136 MMOL/L (ref 136–145)
WBC # BLD AUTO: 9 X10*3/UL (ref 4.4–11.3)

## 2025-06-07 PROCEDURE — 85025 COMPLETE CBC W/AUTO DIFF WBC: CPT | Performed by: NURSE PRACTITIONER

## 2025-06-07 PROCEDURE — 83605 ASSAY OF LACTIC ACID: CPT | Performed by: NURSE PRACTITIONER

## 2025-06-07 PROCEDURE — 36415 COLL VENOUS BLD VENIPUNCTURE: CPT | Performed by: NURSE PRACTITIONER

## 2025-06-07 PROCEDURE — 83735 ASSAY OF MAGNESIUM: CPT | Performed by: NURSE PRACTITIONER

## 2025-06-07 PROCEDURE — 82947 ASSAY GLUCOSE BLOOD QUANT: CPT

## 2025-06-07 PROCEDURE — 84132 ASSAY OF SERUM POTASSIUM: CPT | Performed by: NURSE PRACTITIONER

## 2025-06-07 PROCEDURE — 84132 ASSAY OF SERUM POTASSIUM: CPT | Mod: 59 | Performed by: NURSE PRACTITIONER

## 2025-06-07 PROCEDURE — 99284 EMERGENCY DEPT VISIT MOD MDM: CPT | Performed by: STUDENT IN AN ORGANIZED HEALTH CARE EDUCATION/TRAINING PROGRAM

## 2025-06-07 PROCEDURE — 2500000005 HC RX 250 GENERAL PHARMACY W/O HCPCS

## 2025-06-07 RX ORDER — DEXTROSE 50 % IN WATER (D50W) INTRAVENOUS SYRINGE
Status: COMPLETED
Start: 2025-06-07 | End: 2025-06-07

## 2025-06-07 RX ORDER — DEXTROSE 50 % IN WATER (D50W) INTRAVENOUS SYRINGE
12.5
Status: DISCONTINUED | OUTPATIENT
Start: 2025-06-07 | End: 2025-06-08 | Stop reason: HOSPADM

## 2025-06-07 RX ORDER — DEXTROSE 50 % IN WATER (D50W) INTRAVENOUS SYRINGE
25
Status: DISCONTINUED | OUTPATIENT
Start: 2025-06-07 | End: 2025-06-08 | Stop reason: HOSPADM

## 2025-06-07 RX ORDER — ONDANSETRON HYDROCHLORIDE 2 MG/ML
4 INJECTION, SOLUTION INTRAVENOUS ONCE
Status: COMPLETED | OUTPATIENT
Start: 2025-06-07 | End: 2025-06-08

## 2025-06-07 RX ADMIN — DEXTROSE MONOHYDRATE 50 ML: 25 INJECTION, SOLUTION INTRAVENOUS at 22:10

## 2025-06-07 ASSESSMENT — PAIN DESCRIPTION - PROGRESSION: CLINICAL_PROGRESSION: NOT CHANGED

## 2025-06-07 ASSESSMENT — LIFESTYLE VARIABLES
TOTAL SCORE: 0
EVER FELT BAD OR GUILTY ABOUT YOUR DRINKING: NO
EVER HAD A DRINK FIRST THING IN THE MORNING TO STEADY YOUR NERVES TO GET RID OF A HANGOVER: NO
HAVE PEOPLE ANNOYED YOU BY CRITICIZING YOUR DRINKING: NO
HAVE YOU EVER FELT YOU SHOULD CUT DOWN ON YOUR DRINKING: NO

## 2025-06-07 ASSESSMENT — PAIN SCALES - GENERAL: PAINLEVEL_OUTOF10: 0 - NO PAIN

## 2025-06-07 ASSESSMENT — PAIN - FUNCTIONAL ASSESSMENT: PAIN_FUNCTIONAL_ASSESSMENT: 0-10

## 2025-06-08 ENCOUNTER — APPOINTMENT (OUTPATIENT)
Dept: CARDIOLOGY | Facility: HOSPITAL | Age: 55
End: 2025-06-08
Payer: MEDICARE

## 2025-06-08 VITALS
DIASTOLIC BLOOD PRESSURE: 81 MMHG | SYSTOLIC BLOOD PRESSURE: 154 MMHG | WEIGHT: 160 LBS | BODY MASS INDEX: 25.71 KG/M2 | TEMPERATURE: 98.8 F | HEART RATE: 90 BPM | HEIGHT: 66 IN | OXYGEN SATURATION: 100 % | RESPIRATION RATE: 16 BRPM

## 2025-06-08 LAB
ALBUMIN SERPL BCP-MCNC: 3.8 G/DL (ref 3.4–5)
ALP SERPL-CCNC: 70 U/L (ref 33–120)
ALT SERPL W P-5'-P-CCNC: 21 U/L (ref 10–52)
ANION GAP SERPL CALC-SCNC: 10 MMOL/L (ref 10–20)
AST SERPL W P-5'-P-CCNC: 24 U/L (ref 9–39)
ATRIAL RATE: 88 BPM
BILIRUB SERPL-MCNC: 0.3 MG/DL (ref 0–1.2)
BUN SERPL-MCNC: 21 MG/DL (ref 6–23)
CALCIUM SERPL-MCNC: 9.1 MG/DL (ref 8.6–10.3)
CHLORIDE SERPL-SCNC: 107 MMOL/L (ref 98–107)
CO2 SERPL-SCNC: 25 MMOL/L (ref 21–32)
CREAT SERPL-MCNC: 1.13 MG/DL (ref 0.5–1.3)
EGFRCR SERPLBLD CKD-EPI 2021: 76 ML/MIN/1.73M*2
GLUCOSE BLD MANUAL STRIP-MCNC: 130 MG/DL (ref 74–99)
GLUCOSE SERPL-MCNC: 57 MG/DL (ref 74–99)
LACTATE SERPL-SCNC: 2.1 MMOL/L (ref 0.4–2)
MAGNESIUM SERPL-MCNC: 1.89 MG/DL (ref 1.6–2.4)
P AXIS: 64 DEGREES
P OFFSET: 202 MS
P ONSET: 145 MS
POTASSIUM SERPL-SCNC: 3.3 MMOL/L (ref 3.5–5.3)
PR INTERVAL: 146 MS
PROT SERPL-MCNC: 6.4 G/DL (ref 6.4–8.2)
Q ONSET: 218 MS
QRS COUNT: 15 BEATS
QRS DURATION: 90 MS
QT INTERVAL: 396 MS
QTC CALCULATION(BAZETT): 479 MS
QTC FREDERICIA: 450 MS
R AXIS: 41 DEGREES
SODIUM SERPL-SCNC: 139 MMOL/L (ref 136–145)
T AXIS: 19 DEGREES
T OFFSET: 416 MS
VENTRICULAR RATE: 88 BPM

## 2025-06-08 PROCEDURE — 2500000004 HC RX 250 GENERAL PHARMACY W/ HCPCS (ALT 636 FOR OP/ED): Performed by: NURSE PRACTITIONER

## 2025-06-08 PROCEDURE — 82947 ASSAY GLUCOSE BLOOD QUANT: CPT

## 2025-06-08 PROCEDURE — 93005 ELECTROCARDIOGRAM TRACING: CPT

## 2025-06-08 PROCEDURE — 96374 THER/PROPH/DIAG INJ IV PUSH: CPT

## 2025-06-08 RX ADMIN — ONDANSETRON 4 MG: 2 INJECTION INTRAMUSCULAR; INTRAVENOUS at 00:37

## 2025-06-08 NOTE — ED PROVIDER NOTES
HPI   Chief Complaint   Patient presents with    Hypoglycemia       Patient is a nontoxic-appearing 55-year-old male with a past medical history of hypoglycemia, diabetes, osteomyelitis, mild intellectual disability, sensory hearing loss, presents the emergency today for complaint of hypoglycemia.  EMS state upon arrival glucose level was in the teens and they administered an amp of D50.  Upon leaving residence glucose level was 133.  Upon arrival to emergency room glucose level was 27 and patient is diaphoretic.  Patient states he had a peanut butter sandwich earlier today however denies any thing else to eat.  Patient denies any headache pain, visual disturbances, numbness or tingling, injuries trauma or falls, chest pain, shortness of breath difficulty breathing, abdominal pain, nausea vomiting diarrhea or constipation, fever, shaking, or chills.  Patient states he was recently started on Trulicity.              Patient History   Medical History[1]  Surgical History[2]  Family History[3]  Social History[4]    Physical Exam   ED Triage Vitals [06/07/25 2201]   Temperature Heart Rate Respirations BP   37.1 °C (98.8 °F) 89 20 159/76      Pulse Ox Temp Source Heart Rate Source Patient Position   100 % Temporal Monitor Sitting      BP Location FiO2 (%)     Left arm --       Physical Exam  Vitals and nursing note reviewed. Exam conducted with a chaperone present.   Constitutional:       General: He is not in acute distress.     Appearance: Normal appearance. He is not ill-appearing, toxic-appearing or diaphoretic.      Interventions: He is not intubated.  HENT:      Head: Normocephalic.      Nose: Nose normal. No congestion or rhinorrhea.      Mouth/Throat:      Mouth: Mucous membranes are moist.      Pharynx: No oropharyngeal exudate or posterior oropharyngeal erythema.   Eyes:      General:         Right eye: No discharge.         Left eye: No discharge.      Extraocular Movements: Extraocular movements intact.       Pupils: Pupils are equal, round, and reactive to light.   Neck:      Vascular: No carotid bruit.   Cardiovascular:      Rate and Rhythm: Normal rate and regular rhythm.      Pulses: Normal pulses. No decreased pulses.      Heart sounds: Normal heart sounds. Heart sounds not distant. No murmur heard.     No friction rub. No gallop.   Pulmonary:      Effort: Pulmonary effort is normal. No tachypnea, bradypnea, accessory muscle usage, prolonged expiration, respiratory distress or retractions. He is not intubated.      Breath sounds: Normal breath sounds. No stridor, decreased air movement or transmitted upper airway sounds. No decreased breath sounds, wheezing, rhonchi or rales.   Chest:      Chest wall: No tenderness.   Abdominal:      General: Abdomen is flat. Bowel sounds are normal. There is no distension.      Palpations: Abdomen is soft. There is no mass.      Tenderness: There is no abdominal tenderness. There is no right CVA tenderness, left CVA tenderness, guarding or rebound.      Hernia: No hernia is present.   Musculoskeletal:         General: Normal range of motion.      Cervical back: Normal range of motion and neck supple. No rigidity or tenderness.   Lymphadenopathy:      Cervical: No cervical adenopathy.   Skin:     General: Skin is warm and dry.      Capillary Refill: Capillary refill takes less than 2 seconds.   Neurological:      General: No focal deficit present.      Mental Status: He is alert and oriented to person, place, and time.      Cranial Nerves: No cranial nerve deficit.      Sensory: No sensory deficit.      Motor: No weakness.      Coordination: Coordination normal.      Gait: Gait normal.      Deep Tendon Reflexes: Reflexes normal.           ED Course & [facility]   ED Course as of 06/08/25 0137   Sat Jun 07, 2025 2312 POCT Glucose: 97 [EC]   2343 EKG interpreted by myself reveals normal sinus rhythm rate of 88 bpm with no ST elevation, there is T wave inversion however in lead V1 that is  similar comparison to previous EKG, UT interval of 146, QRS of 90 and QTc of 479. [EC]   2345 POCT Glucose(!): 111 [EC]      ED Course User Index  [EC] JAY Valdez         Diagnoses as of 06/08/25 0137   Hypoglycemia                 No data recorded     [place] Coma Scale Score: 15 (06/07/25 2150 : Mary Spaulding RN)                           Medical Decision Making  Given patient's complaint presentation a thorough exam was performed.  During initial evaluation patient's glucose level was found to be 27 and patient was diaphoretic however answering questions appropriately.  Patient received amp of D50 upon arrival to the emergency room.  No adventitious lung sounds auscultated, speaking clearly no distress, cardiac sounds auscultated are regular, denies any chest pain, shortness of breath or difficulty breathing, remains neurologically intact, NIH is 0, GCS 15.  Lab work was ordered including hypoglycemia protocol.  Patient was also provided food and observed in the emergency room. Patient was provided food and was able to maintain food and fluids without any nausea or vomiting.  Glucose values have stabilized from 27-90 7-1 11 and then 130.  Reevaluation patient reveals significant treatment as he states he feels much better and I do not believe any further evaluation is warranted at this time.  I encouraged patient to increase p.o. intake and to follow-up primary care provider in the next several days for medication adjustment.  Patient was given strict precautions return emergency room for further evaluation.  Patient was agreeable this plan discharged home in stable condition.    JAY Valdez     Portions of this note were generated using digital voice recognition software, and may contain grammatical errors      Procedure  Procedures       [1]   Past Medical History:  Diagnosis Date    Anxiety     Asthma     Diabetes mellitus (Multi)     Hypertension    [2] History reviewed. No  pertinent surgical history.  [3] No family history on file.  [4]   Social History  Tobacco Use    Smoking status: Never    Smokeless tobacco: Never   Vaping Use    Vaping status: Never Used   Substance Use Topics    Alcohol use: Never    Drug use: Never        ALESIA Valdez-ERICA  06/08/25 0137

## 2025-06-29 LAB
ATRIAL RATE: 88 BPM
P AXIS: 64 DEGREES
P OFFSET: 202 MS
P ONSET: 145 MS
PR INTERVAL: 146 MS
Q ONSET: 218 MS
QRS COUNT: 15 BEATS
QRS DURATION: 90 MS
QT INTERVAL: 396 MS
QTC CALCULATION(BAZETT): 479 MS
QTC FREDERICIA: 450 MS
R AXIS: 41 DEGREES
T AXIS: 19 DEGREES
T OFFSET: 416 MS
VENTRICULAR RATE: 88 BPM

## 2025-07-22 ENCOUNTER — HOSPITAL ENCOUNTER (EMERGENCY)
Facility: HOSPITAL | Age: 55
Discharge: HOME | DRG: 885 | End: 2025-07-23
Attending: EMERGENCY MEDICINE
Payer: MEDICARE

## 2025-07-22 ENCOUNTER — APPOINTMENT (OUTPATIENT)
Dept: CARDIOLOGY | Facility: HOSPITAL | Age: 55
DRG: 885 | End: 2025-07-22
Payer: MEDICARE

## 2025-07-22 ENCOUNTER — APPOINTMENT (OUTPATIENT)
Dept: RADIOLOGY | Facility: HOSPITAL | Age: 55
DRG: 885 | End: 2025-07-22
Payer: MEDICARE

## 2025-07-22 DIAGNOSIS — R73.9 HYPERGLYCEMIA: ICD-10-CM

## 2025-07-22 DIAGNOSIS — R45.851 SUICIDAL IDEATION: Primary | ICD-10-CM

## 2025-07-22 LAB
ALBUMIN SERPL BCP-MCNC: 4.1 G/DL (ref 3.4–5)
ALP SERPL-CCNC: 87 U/L (ref 33–120)
ALT SERPL W P-5'-P-CCNC: 13 U/L (ref 10–52)
AMPHETAMINES UR QL SCN: NORMAL
ANION GAP SERPL CALC-SCNC: 12 MMOL/L (ref 10–20)
APAP SERPL-MCNC: <10 UG/ML (ref ?–30)
APPEARANCE UR: CLEAR
AST SERPL W P-5'-P-CCNC: 13 U/L (ref 9–39)
ATRIAL RATE: 86 BPM
BARBITURATES UR QL SCN: NORMAL
BASOPHILS # BLD AUTO: 0.02 X10*3/UL (ref 0–0.1)
BASOPHILS NFR BLD AUTO: 0.5 %
BENZODIAZ UR QL SCN: NORMAL
BILIRUB SERPL-MCNC: 0.3 MG/DL (ref 0–1.2)
BILIRUB UR STRIP.AUTO-MCNC: NEGATIVE MG/DL
BUN SERPL-MCNC: 25 MG/DL (ref 6–23)
BZE UR QL SCN: NORMAL
CALCIUM SERPL-MCNC: 9.2 MG/DL (ref 8.6–10.3)
CANNABINOIDS UR QL SCN: NORMAL
CHLORIDE SERPL-SCNC: 102 MMOL/L (ref 98–107)
CO2 SERPL-SCNC: 24 MMOL/L (ref 21–32)
COLOR UR: COLORLESS
CREAT SERPL-MCNC: 1.35 MG/DL (ref 0.5–1.3)
EGFRCR SERPLBLD CKD-EPI 2021: 62 ML/MIN/1.73M*2
EOSINOPHIL # BLD AUTO: 0.12 X10*3/UL (ref 0–0.7)
EOSINOPHIL NFR BLD AUTO: 2.9 %
ERYTHROCYTE [DISTWIDTH] IN BLOOD BY AUTOMATED COUNT: 12.4 % (ref 11.5–14.5)
ETHANOL SERPL-MCNC: <10 MG/DL
FENTANYL+NORFENTANYL UR QL SCN: NORMAL
GLUCOSE BLD MANUAL STRIP-MCNC: 362 MG/DL (ref 74–99)
GLUCOSE BLD MANUAL STRIP-MCNC: 414 MG/DL (ref 74–99)
GLUCOSE SERPL-MCNC: 501 MG/DL (ref 74–99)
GLUCOSE UR STRIP.AUTO-MCNC: ABNORMAL MG/DL
HCT VFR BLD AUTO: 37 % (ref 41–52)
HGB BLD-MCNC: 12.9 G/DL (ref 13.5–17.5)
IMM GRANULOCYTES # BLD AUTO: 0.01 X10*3/UL (ref 0–0.7)
IMM GRANULOCYTES NFR BLD AUTO: 0.2 % (ref 0–0.9)
KETONES UR STRIP.AUTO-MCNC: NEGATIVE MG/DL
LEUKOCYTE ESTERASE UR QL STRIP.AUTO: NEGATIVE
LYMPHOCYTES # BLD AUTO: 0.74 X10*3/UL (ref 1.2–4.8)
LYMPHOCYTES NFR BLD AUTO: 17.8 %
MCH RBC QN AUTO: 30.9 PG (ref 26–34)
MCHC RBC AUTO-ENTMCNC: 34.9 G/DL (ref 32–36)
MCV RBC AUTO: 89 FL (ref 80–100)
METHADONE UR QL SCN: NORMAL
MONOCYTES # BLD AUTO: 0.22 X10*3/UL (ref 0.1–1)
MONOCYTES NFR BLD AUTO: 5.3 %
NEUTROPHILS # BLD AUTO: 3.05 X10*3/UL (ref 1.2–7.7)
NEUTROPHILS NFR BLD AUTO: 73.3 %
NITRITE UR QL STRIP.AUTO: NEGATIVE
NRBC BLD-RTO: 0 /100 WBCS (ref 0–0)
OPIATES UR QL SCN: NORMAL
OXYCODONE+OXYMORPHONE UR QL SCN: NORMAL
P AXIS: 64 DEGREES
P OFFSET: 200 MS
P ONSET: 145 MS
PCP UR QL SCN: NORMAL
PH UR STRIP.AUTO: 6 [PH]
PLATELET # BLD AUTO: 182 X10*3/UL (ref 150–450)
POTASSIUM SERPL-SCNC: 4.6 MMOL/L (ref 3.5–5.3)
PR INTERVAL: 150 MS
PROT SERPL-MCNC: 6.9 G/DL (ref 6.4–8.2)
PROT UR STRIP.AUTO-MCNC: NEGATIVE MG/DL
Q ONSET: 220 MS
QRS COUNT: 14 BEATS
QRS DURATION: 76 MS
QT INTERVAL: 370 MS
QTC CALCULATION(BAZETT): 442 MS
QTC FREDERICIA: 417 MS
R AXIS: 36 DEGREES
RBC # BLD AUTO: 4.17 X10*6/UL (ref 4.5–5.9)
RBC # UR STRIP.AUTO: NEGATIVE MG/DL
SALICYLATES SERPL-MCNC: <3 MG/DL (ref ?–20)
SODIUM SERPL-SCNC: 133 MMOL/L (ref 136–145)
SP GR UR STRIP.AUTO: 1.01
T AXIS: 63 DEGREES
T OFFSET: 405 MS
UROBILINOGEN UR STRIP.AUTO-MCNC: NORMAL MG/DL
VENTRICULAR RATE: 86 BPM
WBC # BLD AUTO: 4.2 X10*3/UL (ref 4.4–11.3)

## 2025-07-22 PROCEDURE — 85025 COMPLETE CBC W/AUTO DIFF WBC: CPT | Performed by: EMERGENCY MEDICINE

## 2025-07-22 PROCEDURE — 36415 COLL VENOUS BLD VENIPUNCTURE: CPT | Performed by: EMERGENCY MEDICINE

## 2025-07-22 PROCEDURE — 80307 DRUG TEST PRSMV CHEM ANLYZR: CPT | Performed by: EMERGENCY MEDICINE

## 2025-07-22 PROCEDURE — 83036 HEMOGLOBIN GLYCOSYLATED A1C: CPT | Mod: ELYLAB | Performed by: REGISTERED NURSE

## 2025-07-22 PROCEDURE — 82947 ASSAY GLUCOSE BLOOD QUANT: CPT | Mod: 59

## 2025-07-22 PROCEDURE — 2500000002 HC RX 250 W HCPCS SELF ADMINISTERED DRUGS (ALT 637 FOR MEDICARE OP, ALT 636 FOR OP/ED): Performed by: EMERGENCY MEDICINE

## 2025-07-22 PROCEDURE — 2500000004 HC RX 250 GENERAL PHARMACY W/ HCPCS (ALT 636 FOR OP/ED): Performed by: EMERGENCY MEDICINE

## 2025-07-22 PROCEDURE — 96361 HYDRATE IV INFUSION ADD-ON: CPT

## 2025-07-22 PROCEDURE — 70450 CT HEAD/BRAIN W/O DYE: CPT

## 2025-07-22 PROCEDURE — 80053 COMPREHEN METABOLIC PANEL: CPT | Performed by: EMERGENCY MEDICINE

## 2025-07-22 PROCEDURE — 81003 URINALYSIS AUTO W/O SCOPE: CPT | Mod: 59 | Performed by: EMERGENCY MEDICINE

## 2025-07-22 PROCEDURE — 70450 CT HEAD/BRAIN W/O DYE: CPT | Performed by: RADIOLOGY

## 2025-07-22 PROCEDURE — 93005 ELECTROCARDIOGRAM TRACING: CPT

## 2025-07-22 PROCEDURE — 80179 DRUG ASSAY SALICYLATE: CPT | Performed by: EMERGENCY MEDICINE

## 2025-07-22 PROCEDURE — 99285 EMERGENCY DEPT VISIT HI MDM: CPT | Mod: 25 | Performed by: EMERGENCY MEDICINE

## 2025-07-22 PROCEDURE — 96360 HYDRATION IV INFUSION INIT: CPT

## 2025-07-22 RX ORDER — KETOROLAC TROMETHAMINE 30 MG/ML
15 INJECTION, SOLUTION INTRAMUSCULAR; INTRAVENOUS ONCE
Status: DISCONTINUED | OUTPATIENT
Start: 2025-07-22 | End: 2025-07-22

## 2025-07-22 RX ORDER — DEXTROSE 50 % IN WATER (D50W) INTRAVENOUS SYRINGE
12.5
Status: DISCONTINUED | OUTPATIENT
Start: 2025-07-22 | End: 2025-07-23 | Stop reason: HOSPADM

## 2025-07-22 RX ORDER — INSULIN LISPRO 100 [IU]/ML
0-5 INJECTION, SOLUTION INTRAVENOUS; SUBCUTANEOUS EVERY 4 HOURS
Status: DISCONTINUED | OUTPATIENT
Start: 2025-07-22 | End: 2025-07-23 | Stop reason: HOSPADM

## 2025-07-22 RX ORDER — DEXTROSE 50 % IN WATER (D50W) INTRAVENOUS SYRINGE
25
Status: DISCONTINUED | OUTPATIENT
Start: 2025-07-22 | End: 2025-07-23 | Stop reason: HOSPADM

## 2025-07-22 RX ADMIN — INSULIN LISPRO 5 UNITS: 100 INJECTION, SOLUTION INTRAVENOUS; SUBCUTANEOUS at 21:33

## 2025-07-22 RX ADMIN — SODIUM CHLORIDE 500 ML: 0.9 INJECTION, SOLUTION INTRAVENOUS at 19:31

## 2025-07-22 RX ADMIN — SODIUM CHLORIDE 500 ML: 0.9 INJECTION, SOLUTION INTRAVENOUS at 18:53

## 2025-07-22 SDOH — HEALTH STABILITY: MENTAL HEALTH: HAVE YOU HAD THESE THOUGHTS AND HAD SOME INTENTION OF ACTING ON THEM?: YES

## 2025-07-22 SDOH — HEALTH STABILITY: MENTAL HEALTH: BEHAVIORAL HEALTH(WDL): EXCEPTIONS TO WDL

## 2025-07-22 SDOH — HEALTH STABILITY: MENTAL HEALTH: IN THE PAST WEEK, HAVE YOU BEEN HAVING THOUGHTS ABOUT KILLING YOURSELF?: YES

## 2025-07-22 SDOH — HEALTH STABILITY: MENTAL HEALTH: NEEDS EXPRESSED: DENIES

## 2025-07-22 SDOH — HEALTH STABILITY: MENTAL HEALTH: DEPRESSION SYMPTOMS: CHANGE IN ENERGY LEVEL;CRYING;FEELINGS OF HOPELESSESS;INCREASED IRRITABILITY

## 2025-07-22 SDOH — HEALTH STABILITY: MENTAL HEALTH: NON-SPECIFIC ACTIVE SUICIDAL THOUGHTS (PAST 1 MONTH): YES

## 2025-07-22 SDOH — HEALTH STABILITY: MENTAL HEALTH: HAVE YOU EVER DONE ANYTHING, STARTED TO DO ANYTHING, OR PREPARED TO DO ANYTHING TO END YOUR LIFE?: YES

## 2025-07-22 SDOH — SOCIAL STABILITY: SOCIAL NETWORK: PARENT/GUARDIAN/SIGNIFICANT OTHER INVOLVEMENT: ATTENTIVE TO PATIENT NEEDS

## 2025-07-22 SDOH — HEALTH STABILITY: MENTAL HEALTH: SUICIDAL BEHAVIOR (LIFETIME): YES

## 2025-07-22 SDOH — HEALTH STABILITY: MENTAL HEALTH: ACTIVE SUICIDAL IDEATION WITH SPECIFIC PLAN AND INTENT (PAST 1 MONTH): YES

## 2025-07-22 SDOH — HEALTH STABILITY: MENTAL HEALTH: ACTIVE SUICIDAL IDEATION WITH SOME INTENT TO ACT, WITHOUT SPECIFIC PLAN (PAST 1 MONTH): NO

## 2025-07-22 SDOH — HEALTH STABILITY: MENTAL HEALTH: SUICIDE ASSESSMENT: ADULT (C-SSRS)

## 2025-07-22 SDOH — HEALTH STABILITY: MENTAL HEALTH: HAVE YOU ACTUALLY HAD ANY THOUGHTS OF KILLING YOURSELF?: YES

## 2025-07-22 SDOH — HEALTH STABILITY: MENTAL HEALTH: BEHAVIORS/MOOD: COOPERATIVE;CALM

## 2025-07-22 SDOH — HEALTH STABILITY: MENTAL HEALTH: SLEEP PATTERN: UNABLE TO ASSESS

## 2025-07-22 SDOH — HEALTH STABILITY: MENTAL HEALTH: SUICIDAL BEHAVIOR (3 MONTHS): NO

## 2025-07-22 SDOH — SOCIAL STABILITY: SOCIAL INSECURITY: FAMILY BEHAVIORS: APPROPRIATE FOR SITUATION

## 2025-07-22 SDOH — HEALTH STABILITY: MENTAL HEALTH

## 2025-07-22 SDOH — SOCIAL STABILITY: SOCIAL NETWORK: EMOTIONAL SUPPORT GIVEN: REASSURE

## 2025-07-22 SDOH — SOCIAL STABILITY: SOCIAL NETWORK: VISITOR BEHAVIORS: UNABLE TO ASSESS

## 2025-07-22 SDOH — HEALTH STABILITY: MENTAL HEALTH: BEHAVIORS/MOOD: CALM

## 2025-07-22 SDOH — HEALTH STABILITY: MENTAL HEALTH: ARE YOU HAVING THOUGHTS OF KILLING YOURSELF RIGHT NOW?: NO

## 2025-07-22 SDOH — HEALTH STABILITY: MENTAL HEALTH: WAS THIS WITHIN THE PAST THREE MONTHS?: YES

## 2025-07-22 SDOH — SOCIAL STABILITY: SOCIAL INSECURITY: FAMILY BEHAVIORS: UNABLE TO ASSESS

## 2025-07-22 SDOH — HEALTH STABILITY: MENTAL HEALTH: DELUSIONS: OTHER (COMMENT)

## 2025-07-22 SDOH — HEALTH STABILITY: MENTAL HEALTH: WHEN DID YOU TRY TO KILL YOURSELF?: " MANY YEARS AGO"

## 2025-07-22 SDOH — HEALTH STABILITY: MENTAL HEALTH: CONTENT: UNREMARKABLE

## 2025-07-22 SDOH — HEALTH STABILITY: MENTAL HEALTH: WISH TO BE DEAD (PAST 1 MONTH): YES

## 2025-07-22 SDOH — ECONOMIC STABILITY: HOUSING INSECURITY: FEELS SAFE LIVING IN HOME: YES

## 2025-07-22 SDOH — SOCIAL STABILITY: SOCIAL NETWORK

## 2025-07-22 SDOH — SOCIAL STABILITY: SOCIAL NETWORK: VISITOR BEHAVIORS: APPROPRIATE FOR SITUATION

## 2025-07-22 SDOH — HEALTH STABILITY: MENTAL HEALTH: IN THE PAST FEW WEEKS, HAVE YOU FELT THAT YOU OR YOUR FAMILY WOULD BE BETTER OFF IF YOU WERE DEAD?: YES

## 2025-07-22 SDOH — HEALTH STABILITY: MENTAL HEALTH: ANXIETY SYMPTOMS: GENERALIZED;PANIC ATTACK;FEELINGS OF DOOM

## 2025-07-22 SDOH — HEALTH STABILITY: MENTAL HEALTH: SUICIDAL BEHAVIOR (DESCRIPTION): OVERDOSE

## 2025-07-22 SDOH — HEALTH STABILITY: MENTAL HEALTH: IN THE PAST FEW WEEKS, HAVE YOU WISHED YOU WERE DEAD?: YES

## 2025-07-22 SDOH — ECONOMIC STABILITY: GENERAL: FINANCIAL CONCERNS: UNABLE TO PAY BILLS;TRANSPORTATION COSTS

## 2025-07-22 SDOH — HEALTH STABILITY: MENTAL HEALTH: HAVE YOU WISHED YOU WERE DEAD OR WISHED YOU COULD GO TO SLEEP AND NOT WAKE UP?: YES

## 2025-07-22 SDOH — HEALTH STABILITY: MENTAL HEALTH: HOW DID YOU TRY TO KILL YOURSELF?: OVERDOSE

## 2025-07-22 SDOH — HEALTH STABILITY: MENTAL HEALTH: BEHAVIORS/MOOD: SLEEPING

## 2025-07-22 SDOH — HEALTH STABILITY: MENTAL HEALTH
HAVE YOU STARTED TO WORK OUT OR WORKED OUT THE DETAILS OF HOW TO KILL YOURSELF? DO YOU INTENT TO CARRY OUT THIS PLAN?: YES

## 2025-07-22 SDOH — HEALTH STABILITY: MENTAL HEALTH: CONTENT: UNABLE TO ASSESS

## 2025-07-22 SDOH — HEALTH STABILITY: MENTAL HEALTH
OTHER SUICIDE PRECAUTIONS INCLUDE: PATIENT PLACED IN AN EASILY OBSERVABLE ROOM WITH DOOR/CURTAIN REMAINING OPEN;PATIENT PLACED IN GOWN (SNAPS OR PAPER GOWNS PREFERRED) AND WANDED;REMAINING RISKS IDENTIFIED AND MITIGATED;PATIENT PLACED IN PSYCH SAFE ROOM (IF AVAILABLE);ELOPEMENT RISK IDENTIFIED

## 2025-07-22 SDOH — HEALTH STABILITY: MENTAL HEALTH: OTHER SUICIDE PRECAUTIONS INCLUDE: PATIENT PLACED IN AN EASILY OBSERVABLE ROOM WITH DOOR/CURTAIN REMAINING OPEN

## 2025-07-22 SDOH — HEALTH STABILITY: MENTAL HEALTH: HAVE YOU EVER TRIED TO KILL YOURSELF?: YES

## 2025-07-22 SDOH — HEALTH STABILITY: MENTAL HEALTH: RISK OF SUICIDE: HIGH RISK

## 2025-07-22 SDOH — HEALTH STABILITY: MENTAL HEALTH: HAVE YOU BEEN THINKING ABOUT HOW YOU MIGHT DO THIS?: YES

## 2025-07-22 ASSESSMENT — LIFESTYLE VARIABLES
EVER FELT BAD OR GUILTY ABOUT YOUR DRINKING: NO
PRESCIPTION_ABUSE_PAST_12_MONTHS: NO
HAVE PEOPLE ANNOYED YOU BY CRITICIZING YOUR DRINKING: NO
EVER HAD A DRINK FIRST THING IN THE MORNING TO STEADY YOUR NERVES TO GET RID OF A HANGOVER: NO
HAVE YOU EVER FELT YOU SHOULD CUT DOWN ON YOUR DRINKING: NO
SUBSTANCE_ABUSE_PAST_12_MONTHS: NO
TOTAL SCORE: 0

## 2025-07-22 ASSESSMENT — PAIN SCALES - GENERAL: PAINLEVEL_OUTOF10: 0 - NO PAIN

## 2025-07-22 ASSESSMENT — PAIN - FUNCTIONAL ASSESSMENT: PAIN_FUNCTIONAL_ASSESSMENT: 0-10

## 2025-07-22 NOTE — ED PROVIDER NOTES
55-year-old male presents emergency department with chief complaint of wanting psychiatric evaluation.  Patient reports that he has been struggling with his depression and bipolar disorder.  He reports racing thoughts.  He states that he got an argument today with his siblings and had thoughts of wanting to harm himself.  Patient reports thoughts of wanting to kill himself.  He reports plan of overdosing on his insulin or cutting himself.  He does admit to cutting himself in the past.  He denies any fevers, coughing, or congestion.  He does report headache and states that his blood sugars have been up and down and he has had headache like this before, but it feels worse today.  No chest pain or significant difficulty breathing.  Patient admits to nausea no vomiting.  No abdominal pain.  No dysuria, diarrhea, constipation, black or bloody stools. Chart review shows history of hypoglycemia, osteomyelitis, diabetes, hyperlipidemia, mild intellectual disability, and hearing loss.  No report of tobacco use, illicit drug use, or regular alcohol use.  Patient also has history of anxiety and bipolar 2 disorder.      History provided by:  Patient and significant other   used: No           ------------------------------------------------------------------------------------------------------------------------------------------    VS: As documented in the triage note and EMR flowsheet from this visit were reviewed.    Review of Systems  Constitutional: no fever, chills  Eyes: no redness, discharge, pain  HENT: no sore throat, nose bleeds, congestion, rhinorrhea   Cardiovascular: no chest pain, leg edema, palpitations  Respiratory: no shortness of breath, cough, wheezing  GI: Admits to nausea no diarrhea, pain, vomiting, constipation, BRBPR, melena  : no dysuria, frequency, hematuria  Musculoskeletal: no neck pain, stiffness,  no joint deformity, swelling  Skin: no rash, erythema, wounds  Neurological:  Reports headache no dizziness, lightheadedness, weakness, numbness, or tingling  Psychiatric: This is suicidal thoughts and depression.  Denies homicidal ideation or hallucinations  Metabolic: Reports history of diabetes and states that his blood sugars have been up and down  Hematologic: no increased bleeding or bruising  Immunology: No immunocompromise state    [facility]  Nursing notes reviewed and confirmed by me.  Chart review performed including medications, allergies, and medical, surgical, and family history  Visit Vitals  /83   Pulse 91   Temp 36.9 °C (98.4 °F) (Temporal)   Resp 18     Physical Exam  Vitals and nursing note reviewed.   Constitutional:       General: He is not in acute distress.     Appearance: Normal appearance. He is not ill-appearing.   HENT:      Head: Normocephalic and atraumatic.      Right Ear: External ear normal.      Left Ear: External ear normal.      Nose: Nose normal. No congestion or rhinorrhea.      Mouth/Throat:      Mouth: Mucous membranes are moist.      Pharynx: No oropharyngeal exudate or posterior oropharyngeal erythema.     Eyes:      Extraocular Movements: Extraocular movements intact.      Conjunctiva/sclera: Conjunctivae normal.      Pupils: Pupils are equal, round, and reactive to light.       Cardiovascular:      Rate and Rhythm: Normal rate and regular rhythm.      Pulses: Normal pulses.      Heart sounds: Normal heart sounds.   Pulmonary:      Effort: Pulmonary effort is normal. No respiratory distress.      Breath sounds: Normal breath sounds. No stridor. No wheezing, rhonchi or rales.   Abdominal:      General: There is no distension.      Palpations: Abdomen is soft.      Tenderness: There is no abdominal tenderness. There is no guarding or rebound.     Musculoskeletal:         General: No swelling or deformity. Normal range of motion.      Cervical back: Normal range of motion and neck supple. No rigidity.      Right lower leg: No edema.      Left lower leg:  No edema.      Comments: No calf tenderness      Skin:     General: Skin is warm and dry.      Capillary Refill: Capillary refill takes less than 2 seconds.      Coloration: Skin is not jaundiced.      Findings: No rash.     Neurological:      General: No focal deficit present.      Mental Status: He is alert and oriented to person, place, and time.      Sensory: No sensory deficit.      Motor: No weakness.      Comments: NIH stroke scale 0   Psychiatric:         Attention and Perception: Attention normal.         Mood and Affect: Mood is depressed.         Speech: Speech is rapid and pressured.         Behavior: Behavior normal.         Thought Content: Thought content includes suicidal ideation. Thought content does not include homicidal ideation. Thought content includes suicidal plan. Thought content does not include homicidal plan.        Medical History[1]   Surgical History[2]   Social History[3]   ------------------------------------------------------------------------------------------------------------------------------------------  CT head wo IV contrast   Final Result   No evidence of acute cortical infarct or intracranial hemorrhage.        No evidence of intracranial hemorrhage or displaced skull fracture.        MACRO:   None        Signed by: Eriberto Cary 7/22/2025 8:06 PM   Dictation workstation:   [id number]         Labs Reviewed   CBC WITH AUTO DIFFERENTIAL - Abnormal       Result Value    WBC 4.2 (*)     nRBC 0.0      RBC 4.17 (*)     Hemoglobin 12.9 (*)     Hematocrit 37.0 (*)     MCV 89      MCH 30.9      MCHC 34.9      RDW 12.4      Platelets 182      Neutrophils % 73.3      Immature Granulocytes %, Automated 0.2      Lymphocytes % 17.8      Monocytes % 5.3      Eosinophils % 2.9      Basophils % 0.5      Neutrophils Absolute 3.05      Immature Granulocytes Absolute, Automated 0.01      Lymphocytes Absolute 0.74 (*)     Monocytes Absolute 0.22      Eosinophils Absolute 0.12      Basophils  Absolute 0.02     COMPREHENSIVE METABOLIC PANEL - Abnormal    Glucose 501 (*)     Sodium 133 (*)     Potassium 4.6      Chloride 102      Bicarbonate 24      Anion Gap 12      Urea Nitrogen 25 (*)     Creatinine 1.35 (*)     eGFR 62      Calcium 9.2      Albumin 4.1      Alkaline Phosphatase 87      Total Protein 6.9      AST 13      Bilirubin, Total 0.3      ALT 13     URINALYSIS WITH REFLEX CULTURE AND MICROSCOPIC - Abnormal    Color, Urine Colorless (*)     Appearance, Urine Clear      Specific Gravity, Urine 1.010      pH, Urine 6.0      Protein, Urine NEGATIVE      Glucose, Urine OVER (4+) (*)     Blood, Urine NEGATIVE      Ketones, Urine NEGATIVE      Bilirubin, Urine NEGATIVE      Urobilinogen, Urine Normal      Nitrite, Urine NEGATIVE      Leukocyte Esterase, Urine NEGATIVE      Narrative:     OVER is reported when the result is greater than the clinically reportable range.   POCT GLUCOSE - Abnormal    POCT Glucose 414 (*)    POCT GLUCOSE - Abnormal    POCT Glucose 362 (*)    DRUG SCREEN,URINE - Normal    Amphetamine Screen, Urine Presumptive Negative      Barbiturate Screen, Urine Presumptive Negative      Benzodiazepines Screen, Urine Presumptive Negative      Cannabinoid Screen, Urine Presumptive Negative      Cocaine Metabolite Screen, Urine Presumptive Negative      Fentanyl Screen, Urine Presumptive Negative      Opiate Screen, Urine Presumptive Negative      Oxycodone Screen, Urine Presumptive Negative      PCP Screen, Urine Presumptive Negative      Methadone Screen, Urine Presumptive Negative      Narrative:     Drug screen results are presumptive and should not be used to assess   compliance with prescribed medication. Contact the performing [facility] laboratory   to add-on definitive confirmatory testing if clinically indicated.    Toxicology screening results are reported qualitatively. The concentration must   be greater than or equal to the cutoff to be reported as positive. The concentration   at  which the screening test can detect an individual drug or metabolite varies.   The absence of expected drug(s) and/or drug metabolite(s) may indicate non-compliance,   inappropriate timing of specimen collection relative to drug administration, poor drug   absorption, diluted/adulterated urine, or limitations of testing. For medical purposes   only; not valid for forensic use.    Interpretive questions should be directed to the laboratory medical directors.   ACUTE TOXICOLOGY PANEL, BLOOD - Normal    Acetaminophen <10.0      Salicylate  <3      Alcohol <10     URINALYSIS WITH REFLEX CULTURE AND MICROSCOPIC    Narrative:     The following orders were created for panel order Urinalysis with Reflex Culture and Microscopic.  Procedure                               Abnormality         Status                     ---------                               -----------         ------                     Urinalysis with Reflex C...[[id number]]  Abnormal            Final result               Extra Urine Gray Tube[[id number]]                            In process                   Please view results for these tests on the individual orders.   EXTRA URINE GRAY TUBE        Medical Decision Making  EKG interpreted by ED physician: Normal sinus rhythm rate of 86.  HI, QRS, QTc intervals all within normal limits.  No significant ST elevations or depressions.  No significant Q waves.  Good R wave progression.  Normal axis.    55-year-old male presents emergency department with chief complaint of wanting psychiatric evaluation.  Admits to suicidal thoughts.  On my exam he is afebrile and nontoxic.  A thorough workup is obtained given presenting symptoms.  CBC shows leukopenia and anemia at baseline.  I do not suspect sepsis.  CMP shows hyperglycemia and findings of mild dehydration.  Patient treated with IV fluids and placed on sliding scale insulin.  No significant metabolic abnormality.  Drug screen toxicology panel negative.  UA shows  no obvious finding of infection.  Head CT obtained given patient's report of headache shows no acute intracranial process.  On reevaluation patient reports his headache is overall resolved.  He was treated symptomatically with IV fluids.  Toradol was ordered for his headache, but his headache resolved prior to treatment.  Patient is medically cleared.  He was evaluated by [facility] who are recommending psychiatric placement.  I discussed this with the patient.  He is currently agreeable.  Case signed out to Dr. Romero pending psychiatric placement.       Diagnoses as of 07/22/25 2306   Suicidal ideation   Hyperglycemia      1. Suicidal ideation        2. Hyperglycemia           Procedures     This note was dictated using dragon software and may contain errors related to dictation interpretation errors.          [1]   Past Medical History:  Diagnosis Date    Anxiety     Asthma     Bipolar 2 disorder (Multi)     Diabetes mellitus (Multi)     Hypertension    [2] History reviewed. No pertinent surgical history.  [3]   Social History  Socioeconomic History    Marital status:    Tobacco Use    Smoking status: Never    Smokeless tobacco: Never   Vaping Use    Vaping status: Never Used   Substance and Sexual Activity    Alcohol use: Never    Drug use: Never    Sexual activity: Never     Social Drivers of Health     Financial Resource Strain: Low Risk  (10/25/2024)    Overall Financial Resource Strain (CARDIA)     Difficulty of Paying Living Expenses: Not hard at all   Food Insecurity: No Food Insecurity (10/25/2024)    Hunger Vital Sign     Worried About Running Out of Food in the Last Year: Never true     Ran Out of Food in the Last Year: Never true   Transportation Needs: No Transportation Needs (10/25/2024)    PRAPARE - Transportation     Lack of Transportation (Medical): No     Lack of Transportation (Non-Medical): No   Physical Activity: Inactive (1/11/2024)    Exercise Vital Sign     Days of Exercise per Week: 0  days     Minutes of Exercise per Session: 0 min   Stress: No Stress Concern Present (1/11/2024)    [employer] [employer] [employer] - Occupational Stress Questionnaire     Feeling of Stress : Not at all   Social Connections: Unknown (1/11/2024)    Social Connection and Isolation Panel     Frequency of Communication with Friends and Family: Never     Frequency of Social Gatherings with Friends and Family: Once a week     Attends Rastafari Services: Never     Active Member of Clubs or Organizations: No     Attends Club or Organization Meetings: Never   Intimate Partner Violence: Not At Risk (10/25/2024)    Humiliation, Afraid, Rape, and Kick questionnaire     Fear of Current or Ex-Partner: No     Emotionally Abused: No     Physically Abused: No     Sexually Abused: No   Housing Stability: Low Risk  (10/25/2024)    Housing Stability Vital Sign     Unable to Pay for Housing in the Last Year: No     Number of Times Moved in the Last Year: 0     Homeless in the Last Year: No        Isaiah Ansari DO  07/22/25 8318

## 2025-07-23 ENCOUNTER — HOSPITAL ENCOUNTER (INPATIENT)
Facility: HOSPITAL | Age: 55
LOS: 2 days | Discharge: HOME | DRG: 885 | End: 2025-07-25
Attending: PSYCHIATRY & NEUROLOGY | Admitting: PSYCHIATRY & NEUROLOGY
Payer: MEDICARE

## 2025-07-23 VITALS
HEART RATE: 85 BPM | RESPIRATION RATE: 18 BRPM | WEIGHT: 160 LBS | BODY MASS INDEX: 25.71 KG/M2 | HEIGHT: 66 IN | DIASTOLIC BLOOD PRESSURE: 81 MMHG | TEMPERATURE: 98.4 F | OXYGEN SATURATION: 99 % | SYSTOLIC BLOOD PRESSURE: 158 MMHG

## 2025-07-23 DIAGNOSIS — E11.65 TYPE 2 DIABETES MELLITUS WITH HYPERGLYCEMIA, WITHOUT LONG-TERM CURRENT USE OF INSULIN: ICD-10-CM

## 2025-07-23 DIAGNOSIS — F32.A DEPRESSION, UNSPECIFIED DEPRESSION TYPE: Primary | ICD-10-CM

## 2025-07-23 LAB
GLUCOSE BLD MANUAL STRIP-MCNC: 139 MG/DL (ref 74–99)
GLUCOSE BLD MANUAL STRIP-MCNC: 178 MG/DL (ref 74–99)
GLUCOSE BLD MANUAL STRIP-MCNC: 182 MG/DL (ref 74–99)
GLUCOSE BLD MANUAL STRIP-MCNC: 251 MG/DL (ref 74–99)
GLUCOSE BLD MANUAL STRIP-MCNC: 277 MG/DL (ref 74–99)
GLUCOSE BLD MANUAL STRIP-MCNC: 285 MG/DL (ref 74–99)
GLUCOSE BLD MANUAL STRIP-MCNC: 327 MG/DL (ref 74–99)
GLUCOSE BLD MANUAL STRIP-MCNC: 333 MG/DL (ref 74–99)
HOLD SPECIMEN: NORMAL

## 2025-07-23 PROCEDURE — 2500000001 HC RX 250 WO HCPCS SELF ADMINISTERED DRUGS (ALT 637 FOR MEDICARE OP): Performed by: REGISTERED NURSE

## 2025-07-23 PROCEDURE — 97150 GROUP THERAPEUTIC PROCEDURES: CPT | Mod: GO

## 2025-07-23 PROCEDURE — 2500000004 HC RX 250 GENERAL PHARMACY W/ HCPCS (ALT 636 FOR OP/ED): Performed by: STUDENT IN AN ORGANIZED HEALTH CARE EDUCATION/TRAINING PROGRAM

## 2025-07-23 PROCEDURE — 99222 1ST HOSP IP/OBS MODERATE 55: CPT | Performed by: PSYCHIATRY & NEUROLOGY

## 2025-07-23 PROCEDURE — 2500000002 HC RX 250 W HCPCS SELF ADMINISTERED DRUGS (ALT 637 FOR MEDICARE OP, ALT 636 FOR OP/ED): Performed by: EMERGENCY MEDICINE

## 2025-07-23 PROCEDURE — 2500000001 HC RX 250 WO HCPCS SELF ADMINISTERED DRUGS (ALT 637 FOR MEDICARE OP): Performed by: PSYCHIATRY & NEUROLOGY

## 2025-07-23 PROCEDURE — 99222 1ST HOSP IP/OBS MODERATE 55: CPT | Performed by: REGISTERED NURSE

## 2025-07-23 PROCEDURE — 82947 ASSAY GLUCOSE BLOOD QUANT: CPT

## 2025-07-23 PROCEDURE — 97165 OT EVAL LOW COMPLEX 30 MIN: CPT | Mod: GO

## 2025-07-23 PROCEDURE — 2500000002 HC RX 250 W HCPCS SELF ADMINISTERED DRUGS (ALT 637 FOR MEDICARE OP, ALT 636 FOR OP/ED): Performed by: NURSE PRACTITIONER

## 2025-07-23 PROCEDURE — 82947 ASSAY GLUCOSE BLOOD QUANT: CPT | Mod: 59

## 2025-07-23 PROCEDURE — 96361 HYDRATE IV INFUSION ADD-ON: CPT

## 2025-07-23 PROCEDURE — 1240000001 HC SEMI-PRIVATE BH ROOM DAILY

## 2025-07-23 PROCEDURE — 2500000001 HC RX 250 WO HCPCS SELF ADMINISTERED DRUGS (ALT 637 FOR MEDICARE OP): Performed by: STUDENT IN AN ORGANIZED HEALTH CARE EDUCATION/TRAINING PROGRAM

## 2025-07-23 PROCEDURE — 2500000002 HC RX 250 W HCPCS SELF ADMINISTERED DRUGS (ALT 637 FOR MEDICARE OP, ALT 636 FOR OP/ED): Performed by: REGISTERED NURSE

## 2025-07-23 RX ORDER — IBUPROFEN 400 MG/1
400 TABLET, FILM COATED ORAL EVERY 6 HOURS PRN
Status: DISCONTINUED | OUTPATIENT
Start: 2025-07-23 | End: 2025-07-25 | Stop reason: HOSPADM

## 2025-07-23 RX ORDER — ATORVASTATIN CALCIUM 10 MG/1
10 TABLET, FILM COATED ORAL NIGHTLY
Status: DISCONTINUED | OUTPATIENT
Start: 2025-07-23 | End: 2025-07-25 | Stop reason: HOSPADM

## 2025-07-23 RX ORDER — ZIPRASIDONE HYDROCHLORIDE 20 MG/1
80 CAPSULE ORAL ONCE
Status: COMPLETED | OUTPATIENT
Start: 2025-07-23 | End: 2025-07-23

## 2025-07-23 RX ORDER — INSULIN LISPRO 100 [IU]/ML
0-10 INJECTION, SOLUTION INTRAVENOUS; SUBCUTANEOUS
Status: DISCONTINUED | OUTPATIENT
Start: 2025-07-24 | End: 2025-07-23

## 2025-07-23 RX ORDER — DEXTROSE 50 % IN WATER (D50W) INTRAVENOUS SYRINGE
12.5
Status: DISCONTINUED | OUTPATIENT
Start: 2025-07-23 | End: 2025-07-25 | Stop reason: HOSPADM

## 2025-07-23 RX ORDER — ALBUTEROL SULFATE 90 UG/1
2 INHALANT RESPIRATORY (INHALATION) EVERY 6 HOURS PRN
Status: DISCONTINUED | OUTPATIENT
Start: 2025-07-23 | End: 2025-07-25 | Stop reason: HOSPADM

## 2025-07-23 RX ORDER — TRAZODONE HYDROCHLORIDE 50 MG/1
50 TABLET ORAL NIGHTLY PRN
Status: DISCONTINUED | OUTPATIENT
Start: 2025-07-23 | End: 2025-07-25 | Stop reason: HOSPADM

## 2025-07-23 RX ORDER — OLANZAPINE 5 MG/1
5 TABLET, FILM COATED ORAL EVERY 6 HOURS PRN
Status: DISCONTINUED | OUTPATIENT
Start: 2025-07-23 | End: 2025-07-25 | Stop reason: HOSPADM

## 2025-07-23 RX ORDER — HYDROXYZINE HYDROCHLORIDE 25 MG/1
50 TABLET, FILM COATED ORAL EVERY 6 HOURS PRN
Status: DISCONTINUED | OUTPATIENT
Start: 2025-07-23 | End: 2025-07-25 | Stop reason: HOSPADM

## 2025-07-23 RX ORDER — INSULIN LISPRO 100 [IU]/ML
0-5 INJECTION, SOLUTION INTRAVENOUS; SUBCUTANEOUS
Status: DISCONTINUED | OUTPATIENT
Start: 2025-07-23 | End: 2025-07-23

## 2025-07-23 RX ORDER — ZIPRASIDONE HYDROCHLORIDE 40 MG/1
80 CAPSULE ORAL
Status: DISCONTINUED | OUTPATIENT
Start: 2025-07-23 | End: 2025-07-25 | Stop reason: HOSPADM

## 2025-07-23 RX ORDER — DIPHENHYDRAMINE HYDROCHLORIDE 50 MG/ML
50 INJECTION, SOLUTION INTRAMUSCULAR; INTRAVENOUS ONCE AS NEEDED
Status: DISCONTINUED | OUTPATIENT
Start: 2025-07-23 | End: 2025-07-25 | Stop reason: HOSPADM

## 2025-07-23 RX ORDER — FLUTICASONE FUROATE AND VILANTEROL 100; 25 UG/1; UG/1
1 POWDER RESPIRATORY (INHALATION)
Status: DISCONTINUED | OUTPATIENT
Start: 2025-07-23 | End: 2025-07-25 | Stop reason: HOSPADM

## 2025-07-23 RX ORDER — DEXTROSE 50 % IN WATER (D50W) INTRAVENOUS SYRINGE
25
Status: DISCONTINUED | OUTPATIENT
Start: 2025-07-23 | End: 2025-07-25 | Stop reason: HOSPADM

## 2025-07-23 RX ORDER — INSULIN LISPRO 100 [IU]/ML
0-10 INJECTION, SOLUTION INTRAVENOUS; SUBCUTANEOUS
Status: DISCONTINUED | OUTPATIENT
Start: 2025-07-23 | End: 2025-07-24

## 2025-07-23 RX ORDER — ADHESIVE BANDAGE
30 BANDAGE TOPICAL DAILY PRN
Status: DISCONTINUED | OUTPATIENT
Start: 2025-07-23 | End: 2025-07-25 | Stop reason: HOSPADM

## 2025-07-23 RX ORDER — OLANZAPINE 10 MG/2ML
5 INJECTION, POWDER, FOR SOLUTION INTRAMUSCULAR EVERY 6 HOURS PRN
Status: DISCONTINUED | OUTPATIENT
Start: 2025-07-23 | End: 2025-07-25 | Stop reason: HOSPADM

## 2025-07-23 RX ORDER — PIOGLITAZONE 30 MG/1
30 TABLET ORAL DAILY
Status: DISCONTINUED | OUTPATIENT
Start: 2025-07-24 | End: 2025-07-25 | Stop reason: HOSPADM

## 2025-07-23 RX ORDER — ACETAMINOPHEN 325 MG/1
650 TABLET ORAL EVERY 4 HOURS PRN
Status: DISCONTINUED | OUTPATIENT
Start: 2025-07-23 | End: 2025-07-25 | Stop reason: HOSPADM

## 2025-07-23 RX ORDER — DIPHENHYDRAMINE HCL 25 MG
50 TABLET ORAL EVERY 6 HOURS PRN
Status: DISCONTINUED | OUTPATIENT
Start: 2025-07-23 | End: 2025-07-25 | Stop reason: HOSPADM

## 2025-07-23 RX ORDER — BUSPIRONE HYDROCHLORIDE 5 MG/1
15 TABLET ORAL 3 TIMES DAILY
Status: DISCONTINUED | OUTPATIENT
Start: 2025-07-23 | End: 2025-07-25 | Stop reason: HOSPADM

## 2025-07-23 RX ADMIN — ATORVASTATIN CALCIUM 10 MG: 10 TABLET ORAL at 20:40

## 2025-07-23 RX ADMIN — INSULIN LISPRO 4 UNITS: 100 INJECTION, SOLUTION INTRAVENOUS; SUBCUTANEOUS at 17:16

## 2025-07-23 RX ADMIN — ZIPRASIDONE HYDROCHLORIDE 80 MG: 40 CAPSULE ORAL at 08:29

## 2025-07-23 RX ADMIN — HYDROXYZINE HYDROCHLORIDE 50 MG: 25 TABLET ORAL at 02:49

## 2025-07-23 RX ADMIN — SODIUM CHLORIDE 500 ML: 0.9 INJECTION, SOLUTION INTRAVENOUS at 01:30

## 2025-07-23 RX ADMIN — BUSPIRONE HYDROCHLORIDE 15 MG: 5 TABLET ORAL at 20:40

## 2025-07-23 RX ADMIN — ZIPRASIDONE HYDROCHLORIDE 80 MG: 20 CAPSULE ORAL at 00:38

## 2025-07-23 RX ADMIN — FLUTICASONE FUROATE AND VILANTEROL TRIFENATATE 1 PUFF: 100; 25 POWDER RESPIRATORY (INHALATION) at 11:58

## 2025-07-23 RX ADMIN — INSULIN LISPRO 4 UNITS: 100 INJECTION, SOLUTION INTRAVENOUS; SUBCUTANEOUS at 00:37

## 2025-07-23 RX ADMIN — BUSPIRONE HYDROCHLORIDE 15 MG: 5 TABLET ORAL at 08:29

## 2025-07-23 RX ADMIN — ZIPRASIDONE HYDROCHLORIDE 80 MG: 40 CAPSULE ORAL at 17:16

## 2025-07-23 RX ADMIN — INSULIN LISPRO 6 UNITS: 100 INJECTION, SOLUTION INTRAVENOUS; SUBCUTANEOUS at 21:36

## 2025-07-23 RX ADMIN — INSULIN LISPRO 3 UNITS: 100 INJECTION, SOLUTION INTRAVENOUS; SUBCUTANEOUS at 11:57

## 2025-07-23 RX ADMIN — TRAZODONE HYDROCHLORIDE 50 MG: 50 TABLET ORAL at 02:49

## 2025-07-23 RX ADMIN — BUSPIRONE HYDROCHLORIDE 15 MG: 5 TABLET ORAL at 15:28

## 2025-07-23 RX ADMIN — TRAZODONE HYDROCHLORIDE 50 MG: 50 TABLET ORAL at 20:40

## 2025-07-23 SDOH — SOCIAL STABILITY: SOCIAL INSECURITY: ARE YOU MARRIED, WIDOWED, DIVORCED, SEPARATED, NEVER MARRIED, OR LIVING WITH A PARTNER?: MARRIED

## 2025-07-23 SDOH — SOCIAL STABILITY: SOCIAL NETWORK: VISITOR BEHAVIORS: UNABLE TO ASSESS

## 2025-07-23 SDOH — SOCIAL STABILITY: SOCIAL INSECURITY: ARE YOU OR HAVE YOU BEEN THREATENED OR ABUSED PHYSICALLY, EMOTIONALLY, OR SEXUALLY BY ANYONE?: NO

## 2025-07-23 SDOH — ECONOMIC STABILITY: HOUSING INSECURITY: IN THE LAST 12 MONTHS, WAS THERE A TIME WHEN YOU WERE NOT ABLE TO PAY THE MORTGAGE OR RENT ON TIME?: NO

## 2025-07-23 SDOH — HEALTH STABILITY: PHYSICAL HEALTH: ON AVERAGE, HOW MANY DAYS PER WEEK DO YOU ENGAGE IN MODERATE TO STRENUOUS EXERCISE (LIKE A BRISK WALK)?: 3 DAYS

## 2025-07-23 SDOH — SOCIAL STABILITY: SOCIAL INSECURITY: HAVE YOU HAD ANY THOUGHTS OF HARMING ANYONE ELSE?: NO

## 2025-07-23 SDOH — SOCIAL STABILITY: SOCIAL INSECURITY: WITHIN THE LAST YEAR, HAVE YOU BEEN AFRAID OF YOUR PARTNER OR EX-PARTNER?: NO

## 2025-07-23 SDOH — SOCIAL STABILITY: SOCIAL INSECURITY: WERE YOU ABLE TO COMPLETE ALL THE BEHAVIORAL HEALTH SCREENINGS?: YES

## 2025-07-23 SDOH — SOCIAL STABILITY: SOCIAL NETWORK: PARENT/GUARDIAN/SIGNIFICANT OTHER INVOLVEMENT: ATTENTIVE TO PATIENT NEEDS

## 2025-07-23 SDOH — HEALTH STABILITY: MENTAL HEALTH
DO YOU FEEL STRESS - TENSE, RESTLESS, NERVOUS, OR ANXIOUS, OR UNABLE TO SLEEP AT NIGHT BECAUSE YOUR MIND IS TROUBLED ALL THE TIME - THESE DAYS?: TO SOME EXTENT

## 2025-07-23 SDOH — HEALTH STABILITY: MENTAL HEALTH: CONTENT: UNABLE TO ASSESS

## 2025-07-23 SDOH — SOCIAL STABILITY: SOCIAL NETWORK: IN A TYPICAL WEEK, HOW MANY TIMES DO YOU TALK ON THE PHONE WITH FAMILY, FRIENDS, OR NEIGHBORS?: ONCE A WEEK

## 2025-07-23 SDOH — SOCIAL STABILITY: SOCIAL INSECURITY: HAS ANYONE EVER THREATENED TO HURT YOUR FAMILY OR YOUR PETS?: NO

## 2025-07-23 SDOH — SOCIAL STABILITY: SOCIAL NETWORK
DO YOU BELONG TO ANY CLUBS OR ORGANIZATIONS SUCH AS CHURCH GROUPS, UNIONS, FRATERNAL OR ATHLETIC GROUPS, OR SCHOOL GROUPS?: NO

## 2025-07-23 SDOH — HEALTH STABILITY: MENTAL HEALTH: HOW OFTEN DO YOU HAVE SIX OR MORE DRINKS ON ONE OCCASION?: NEVER

## 2025-07-23 SDOH — SOCIAL STABILITY: SOCIAL INSECURITY: WITHIN THE LAST YEAR, HAVE YOU BEEN HUMILIATED OR EMOTIONALLY ABUSED IN OTHER WAYS BY YOUR PARTNER OR EX-PARTNER?: NO

## 2025-07-23 SDOH — ECONOMIC STABILITY: FOOD INSECURITY: WITHIN THE PAST 12 MONTHS, THE FOOD YOU BOUGHT JUST DIDN'T LAST AND YOU DIDN'T HAVE MONEY TO GET MORE.: NEVER TRUE

## 2025-07-23 SDOH — HEALTH STABILITY: MENTAL HEALTH: BEHAVIORAL HEALTH(WDL): EXCEPTIONS TO WDL

## 2025-07-23 SDOH — ECONOMIC STABILITY: TRANSPORTATION INSECURITY: IN THE PAST 12 MONTHS, HAS LACK OF TRANSPORTATION KEPT YOU FROM MEDICAL APPOINTMENTS OR FROM GETTING MEDICATIONS?: NO

## 2025-07-23 SDOH — ECONOMIC STABILITY: GENERAL: FINANCIAL CONCERNS: UNABLE TO PAY BILLS;TRANSPORTATION COSTS

## 2025-07-23 SDOH — ECONOMIC STABILITY: HOUSING INSECURITY: IN THE PAST 12 MONTHS, HOW MANY TIMES HAVE YOU MOVED WHERE YOU WERE LIVING?: 0

## 2025-07-23 SDOH — HEALTH STABILITY: MENTAL HEALTH: ANXIETY SYMPTOMS: GENERALIZED

## 2025-07-23 SDOH — HEALTH STABILITY: MENTAL HEALTH: HOW OFTEN DO YOU HAVE A DRINK CONTAINING ALCOHOL?: NEVER

## 2025-07-23 SDOH — ECONOMIC STABILITY: HOUSING INSECURITY: FEELS SAFE LIVING IN HOME: YES

## 2025-07-23 SDOH — ECONOMIC STABILITY: FOOD INSECURITY: WITHIN THE PAST 12 MONTHS, YOU WORRIED THAT YOUR FOOD WOULD RUN OUT BEFORE YOU GOT THE MONEY TO BUY MORE.: NEVER TRUE

## 2025-07-23 SDOH — ECONOMIC STABILITY: HOUSING INSECURITY: AT ANY TIME IN THE PAST 12 MONTHS, WERE YOU HOMELESS OR LIVING IN A SHELTER (INCLUDING NOW)?: NO

## 2025-07-23 SDOH — SOCIAL STABILITY: SOCIAL NETWORK: EMOTIONAL SUPPORT GIVEN: REASSURE

## 2025-07-23 SDOH — SOCIAL STABILITY: SOCIAL NETWORK: HOW OFTEN DO YOU ATTEND MEETINGS OF THE CLUBS OR ORGANIZATIONS YOU BELONG TO?: NEVER

## 2025-07-23 SDOH — ECONOMIC STABILITY: INCOME INSECURITY: IN THE PAST 12 MONTHS HAS THE ELECTRIC, GAS, OIL, OR WATER COMPANY THREATENED TO SHUT OFF SERVICES IN YOUR HOME?: NO

## 2025-07-23 SDOH — SOCIAL STABILITY: SOCIAL NETWORK: HOW OFTEN DO YOU GET TOGETHER WITH FRIENDS OR RELATIVES?: ONCE A WEEK

## 2025-07-23 SDOH — SOCIAL STABILITY: SOCIAL INSECURITY: DOES ANYONE TRY TO KEEP YOU FROM HAVING/CONTACTING OTHER FRIENDS OR DOING THINGS OUTSIDE YOUR HOME?: NO

## 2025-07-23 SDOH — SOCIAL STABILITY: SOCIAL INSECURITY: FAMILY BEHAVIORS: UNABLE TO ASSESS

## 2025-07-23 SDOH — HEALTH STABILITY: MENTAL HEALTH: HOW MANY DRINKS CONTAINING ALCOHOL DO YOU HAVE ON A TYPICAL DAY WHEN YOU ARE DRINKING?: PATIENT DOES NOT DRINK

## 2025-07-23 SDOH — ECONOMIC STABILITY: FOOD INSECURITY: HOW HARD IS IT FOR YOU TO PAY FOR THE VERY BASICS LIKE FOOD, HOUSING, MEDICAL CARE, AND HEATING?: SOMEWHAT HARD

## 2025-07-23 SDOH — HEALTH STABILITY: MENTAL HEALTH: BEHAVIORS/MOOD: SLEEPING

## 2025-07-23 SDOH — SOCIAL STABILITY: SOCIAL INSECURITY: HAVE YOU HAD THOUGHTS OF HARMING ANYONE ELSE?: NO

## 2025-07-23 SDOH — SOCIAL STABILITY: SOCIAL INSECURITY: ARE THERE ANY APPARENT SIGNS OF INJURIES/BEHAVIORS THAT COULD BE RELATED TO ABUSE/NEGLECT?: NO

## 2025-07-23 SDOH — HEALTH STABILITY: MENTAL HEALTH

## 2025-07-23 SDOH — HEALTH STABILITY: PHYSICAL HEALTH: ON AVERAGE, HOW MANY MINUTES DO YOU ENGAGE IN EXERCISE AT THIS LEVEL?: 30 MIN

## 2025-07-23 SDOH — SOCIAL STABILITY: SOCIAL NETWORK: HOW OFTEN DO YOU ATTEND CHURCH OR RELIGIOUS SERVICES?: NEVER

## 2025-07-23 SDOH — SOCIAL STABILITY: SOCIAL INSECURITY: DO YOU FEEL ANYONE HAS EXPLOITED OR TAKEN ADVANTAGE OF YOU FINANCIALLY OR OF YOUR PERSONAL PROPERTY?: NO

## 2025-07-23 SDOH — HEALTH STABILITY: MENTAL HEALTH: NEEDS EXPRESSED: DENIES

## 2025-07-23 SDOH — HEALTH STABILITY: MENTAL HEALTH: DEPRESSION SYMPTOMS: NO PROBLEMS REPORTED OR OBSERVED.

## 2025-07-23 SDOH — SOCIAL STABILITY: SOCIAL INSECURITY: ABUSE: ADULT

## 2025-07-23 SDOH — HEALTH STABILITY: MENTAL HEALTH: BEHAVIORS/MOOD: ANXIOUS

## 2025-07-23 SDOH — SOCIAL STABILITY: SOCIAL INSECURITY: DO YOU FEEL UNSAFE GOING BACK TO THE PLACE WHERE YOU ARE LIVING?: NO

## 2025-07-23 ASSESSMENT — LIFESTYLE VARIABLES
HEADACHE, FULLNESS IN HEAD: NOT PRESENT
PRESCIPTION_ABUSE_PAST_12_MONTHS: NO
ORIENTATION AND CLOUDING OF SENSORIUM: ORIENTED AND CAN DO SERIAL ADDITIONS
SKIP TO QUESTIONS 9-10: 1
HOW MANY STANDARD DRINKS CONTAINING ALCOHOL DO YOU HAVE ON A TYPICAL DAY: PATIENT DOES NOT DRINK
HOW OFTEN DO YOU HAVE A DRINK CONTAINING ALCOHOL: NEVER
PAROXYSMAL SWEATS: NO SWEAT VISIBLE
NAUSEA AND VOMITING: NO NAUSEA AND NO VOMITING
HOW OFTEN DO YOU HAVE 6 OR MORE DRINKS ON ONE OCCASION: NEVER
PRESCIPTION_ABUSE_PAST_12_MONTHS: NO
AUDITORY DISTURBANCES: NOT PRESENT
TREMOR: NO TREMOR
SUBSTANCE_ABUSE_PAST_12_MONTHS: NO
ANXIETY: MILDLY ANXIOUS
SKIP TO QUESTIONS 9-10: 1
SUBSTANCE_ABUSE_PAST_12_MONTHS: NO
AUDIT-C TOTAL SCORE: 0
TOTAL_SCORE: 1
AUDIT-C TOTAL SCORE: 0
CIWA TOTAL SCORE: 1
VISUAL DISTURBANCES: NOT PRESENT
AGITATION: NORMAL ACTIVITY
AUDIT-C TOTAL SCORE: 0

## 2025-07-23 ASSESSMENT — ACTIVITIES OF DAILY LIVING (ADL)
HEARING - LEFT EAR: FUNCTIONAL
GROOMING: INDEPENDENT
TOILETING: INDEPENDENT
BATHING: INDEPENDENT
PATIENT'S MEMORY ADEQUATE TO SAFELY COMPLETE DAILY ACTIVITIES?: YES
ADEQUATE_TO_COMPLETE_ADL: YES
JUDGMENT_ADEQUATE_SAFELY_COMPLETE_DAILY_ACTIVITIES: YES
LACK_OF_TRANSPORTATION: NO
ASSISTIVE_DEVICE: DENTURES LOWER;DENTURES UPPER;EYEGLASSES
DRESSING YOURSELF: INDEPENDENT
WALKS IN HOME: INDEPENDENT
FEEDING YOURSELF: INDEPENDENT
HEARING - RIGHT EAR: FUNCTIONAL

## 2025-07-23 ASSESSMENT — PAIN - FUNCTIONAL ASSESSMENT
PAIN_FUNCTIONAL_ASSESSMENT: 0-10

## 2025-07-23 ASSESSMENT — PAIN SCALES - GENERAL
PAINLEVEL_OUTOF10: 0 - NO PAIN

## 2025-07-23 ASSESSMENT — PATIENT HEALTH QUESTIONNAIRE - PHQ9
1. LITTLE INTEREST OR PLEASURE IN DOING THINGS: SEVERAL DAYS
2. FEELING DOWN, DEPRESSED OR HOPELESS: SEVERAL DAYS
SUM OF ALL RESPONSES TO PHQ9 QUESTIONS 1 & 2: 2

## 2025-07-23 NOTE — CONSULTS
Nutrition Progress Note    Nutrition consult triggered via nursing admission screen protocol. Assessment not indicated as patients on this unit are seen by this service only if consult is ordered by provider. Please re-consult if RD is needed.

## 2025-07-23 NOTE — CARE PLAN
Problem: Potential for Harm to Self or Others  Goal: Participates in unit activities  Outcome: Progressing     Problem: Potential for Harm to Self or Others  Goal: Patient/Family participate in treatment and discharge plans  Outcome: Progressing     Problem: Potential for Harm to Self or Others  Goal: Identifies deescalation techniques  Outcome: Progressing     Problem: Potential for Harm to Self or Others  Goal: Understands least restrictive measures  Outcome: Progressing     Problem: Potential for Harm to Self or Others  Goal: Denies harm toward self or others  Outcome: Progressing     Problem: Anxiety  Goal: Verbalizes ways to manage anxiety  Outcome: Progressing     Problem: Anxiety  Goal: Attempts to manage anxiety with help  Outcome: Progressing     Problem: Defensive Coping  Goal: Demonstrates appropriate social interactions  Outcome: Progressing     Problem: Risk for Suicide  Goal: Makes needs known through verbalization or behaviors this shift  Outcome: Progressing     Problem: Risk for Suicide  Goal: Identifies supports this shift  Outcome: Progressing     Problem: Risk for Suicide  Goal: Accepts medications as prescribed/needed this shift  Outcome: Progressing     Problem: Risk for Suicide  Goal: No self harm this shift  Outcome: Progressing         The patient's goals for the shift include pt wants to get his meds adjusted    The clinical goals for the shift include pt will be active on the unit    Over the shift, the patient did make progress toward the following goals.     Pt denies SI, HI, and A/VH. Pt reports his anxiety as a 5/10 and depression as a 0/10, with 10 being the worst. Patient did not need any PRN's this shift. Pt was medication compliant this shift. Pt went to the groups this shift. Patient ate all of his meals in the dayroom with his peers and ate an adequate amount on each tray. Pt is able to make his needs known. Nursing will continue to monitor.

## 2025-07-23 NOTE — PROGRESS NOTES
Social Work Note       07/23/25 1100   History of Present Illness   Admission Reason Increased depression and anxiety   HPI Per ED: Pt. States he has been thinking of harming himself with a knife or to take all of his insulin   SW Readmission Information    Readmission within 30 Days No   Psychiatric Symptoms   Anxiety Symptoms Generalized   Depression Symptoms No problems reported or observed.   Melissa Symptoms No problems reported or observed.   Psychosis Symptoms   Hallucination Type No problems reported or observed.   Delusion Type No problems reported or observed.   Additional Symptoms - Adult   Generalized Anxiety Disorder Difficult to control worry;Difficulity concentrating;Excessive anxiety/worry   Obsessive Compulsive Disorder No problems reported or observed.   Panic Attack No problems reported or observed.   Post Traumatic Stress Disorder No problems reported or observed.   Delirium No problems reported or observed.   Past Psychiatric History/Meds/Treatments   Past Psychiatric History Hx of biploar and depression   Past Psychiatric Meds/Treatments Geodon, Prozac and Buspar   Past Violence/Victimization History Denies   Current Mental Health Contacts    Name/Phone Number [facility] [phone]    Last Appointment Date Next appt 7/24   Support System   Support System Immediate family   Living Arrangement   Living Arrangement House   Home Safety   Feels Safe Living in Home Yes   Potentially Unsafe Housing Conditions Unable to Assess   Home Safety  Feels safe at home   Income Information   Employment Status for Patient   Employment Status Disabled   Income Source Disability   Current/Previous Occupation Unable to Assess   Income/Expense Information Expenses exceed income   Financial Concerns Unable to Pay Bills;Transportation Costs   Who Manages Finances if Patient Unable Wife   Employment/ Finance Comments Stressed bout fiances issues in the home   [place]  Service/Education History   Current or Previous  Service None   Social/Cultural History   Social History Pt is disabled   Cultural Requests During Hospitalization Denies   Spiritual Requests During Hospitalization Denies   Important Activities Family   Legal   Legal Considerations Patient/ Family Ability to Make Healthcare Decisions   Legal Concerns Denies   Drug Screening   Have you used any substances (canabis, cocaine, heroin, hallucinogens, inhalants, etc.) in the past 12 months? No   Have you used any prescription drugs other than prescribed in the past 12 months? No   Is a toxicology screen needed? No   Behavioral Health   Behavioral Health(WDL) X   Behaviors/Mood Calm   Affect Appropriate to circumstances   Orientation   Orientation Level Oriented X4   General Appearance   Motor Activity Unremarkable   General Attitude Pleasant   Appearance/Hygiene Unremarkable   Thought Process   Coherency Flight of ideas   Content Unremarkable   Perception Not altered   Hallucination None   Judgment/Insight Poor   Confusion None   Cognition Poor judgement   Sleep Pattern   Sleep Pattern Unable to assess   Risk Factors   Self Harm/Suicidal Ideation Plan Overdose on insulin   Previous Self Harm/Suicidal Plans Overdose on pills mnay years ago   Risk Factors None   Violence Risk Assessment   Assessment of Violence None noted   Thoughts of Harm to Others No   Step 1: Risk Factors   Current & Past Psychiatric Dx Mood disorder;Recent onset   Presenting Symptoms Hopelessness or despair;Anxiety and/or panic   Precipitants/Stressors Triggering events leading to humiliation, shame, and/or despair (e.g. loss of relationship, financial or health status) (real or anticipated)   Change in Treatment Hopeless or dissatisfied with provider or treatment   Access to Lethal Methods  No   Step 2: Protective Factors    Protective Factors Internal Identifies reasons for living   Protective Factors External Positive therapeutic  relationships;Supportive social network or family or friends

## 2025-07-23 NOTE — SIGNIFICANT EVENT
Application for Emergency Admission      Ready for Transfer?  Is the patient medically cleared for transfer to inpatient psychiatry: Yes  Has the patient been accepted to an inpatient psychiatric hospital: Yes    Application for Emergency Admission  IN ACCORDANCE WITH [place] 5122[place]  The Chief Clinical Officer of: BLANCA RUBI 7/22/2025 .11:12 PM    Reason for Hospitalization  The undersigned has reason to believe that: Carlyle Houser Is a mentally ill person subject to hospitalization by court order under division B Section 5122.01 of the Revised Code, i.e., this person:    1.Yes  Represents a substantial risk of physical harm to self as manifested by evidence of threats of, or attempts at, suicide or serious self-inflicted bodily harm    2.No Represents a substantial risk of physical harm to others as manifested by evidence of recent homicidal or other violent behavior, evidence of recent threats that place another in reasonable fear of violent behavior and serious physical harm, or other evidence of present dangerousness    3.No Represents a substantial and immediate risk of serious physical impairment or injury to self as manifested by  evidence that the person is unable to provide for and is not providing for the person's basic physical needs because of the person's mental illness and that appropriate provision for those needs cannot be made  immediately available in the community    4.Yes Would benefit from treatment in a hospital for his mental illness and is in need of such treatment as manifested by evidence of behavior that creates a grave and imminent risk to substantial rights of others or  himself.    5.No Would benefit from treatment as manifested by evidence of behavior that indicates all of the following:       (a) The person is unlikely to survive safely in the community without supervision, based on a clinical determination.       (b) The person has a history of lack of compliance with  treatment for mental illness and one of the following applies:      (i) At least twice within the thirty-six months prior to the filing of an affidavit seeking court-ordered treatment of the person under section 5122.111 of the Revised Code, the lack of compliance has been a significant factor in necessitating hospitalization in a hospital or receipt of services in a forensic or other mental health unit of a correctional facility, provided that the thirty-six-month period shall be extended by the length of any hospitalization or incarceration of the person that occurred within the thirty-six-month period.      (ii) Within the forty-eight months prior to the filing of an affidavit seeking court-ordered treatment of the person under section 5122.111 of the Revised Code, the lack of compliance resulted in one or more acts of serious violent behavior toward self or others or threats of, or attempts at, serious physical harm to self or others, provided that the forty-eight-month period shall be extended by the length of any hospitalization or incarceration of the person that occurred within the forty-eight-month period.      (c) The person, as a result of mental illness, is unlikely to voluntarily participate in necessary treatment.       (d) In view of the person's treatment history and current behavior, the person is in need of treatment in order to prevent a relapse or deterioration that would be likely to result in substantial risk of serious harm to the person or others.    (e) Represents a substantial risk of physical harm to self or others if allowed to remain at liberty pending examination.    Therefore, it is requested that said person be admitted to the above named facility.    STATEMENT OF BELIEF    Must be filled out by one of the following: a psychiatrist, licensed physician, licensed clinical psychologist, health or ,  or .  (Statement shall include the circumstances under  which the individual was taken into custody and the reason for the person's belief that hospitalization is necessary. The statement shall also include a reference to efforts made to secure the individual's property at his residence if he was taken into custody there. Every reasonable and appropriate effort should be made to take this person into custody in the least conspicuous manner possible.)    Patient presents emergency department due to suicidal ideation.  Reports a plan to harm himself by overdosing on insulin or cutting himself.  At this time patient is felt to be danger to self and would benefit from psychiatric admission.    Isaiah Ansari,  7/22/2025     _____________________________________________________________   Place of Employment: [facility]     STATEMENT OF OBSERVATION BY PSYCHIATRIST, LICENSED PHYSICIAN, OR LICENSED CLINICAL PSYCHOLOGIST, IF APPLICABLE    Place of Observation (e.g., [facility] mental health center, general hospital, office, emergency facility)  (If applicable, please complete)    Isaiah Ansari,  7/22/2025    _____________________________________________________________

## 2025-07-23 NOTE — PROGRESS NOTES
"Occupational Therapy    OT Behavioral Health Evaluation    Patient Name: Carlyle Houser  MRN: [id number]  Department: [address]  Room: [facility]A  Today's Date: 7/23/2025  Time Calculation  Start Time: 1401  Stop Time: 1415  Time Calculation (min): 14 min    OT Intervention Plan:  Skilled OT interventions to include: therapeutic use of self, therapeutic use of occupations and activities, therapeutic groups (including task skill groups, life skill groups, coping skill groups, anger/grief management groups, and ADL/IADL groups), and 1:1 sessions focused on individualized goals for mental health management to improve ADL/IADL performance.      Subjective   Current Problem:  No diagnosis found.  OT Visit Info:  OT Received On: 07/23/25  General Visit Info:  General  Reason for Referral: ADL/IADL impairment  Referred By: Dr. Akins  Past Medical History Relevant to Rehab: anxiety, bipolar 2 d/o, depression, previous attempt via OD, mild intellectual disability, DM, hypoglycemia, osteomyelitis, HLD, hearing loss  Prior to Session Communication: Bedside nurse  Patient Position Received: Up in chair  General Comment: 54 y/o M admitted w/ increased depression, racing thoughts, & thoughts of self-harm after an argument about finances w/ his sister. Pt endorsed SI w/ plan to OD on insulin or cut self. Pt reports recent increase in mood issues as well as appetite & sleep disturbances, primarily attributed to financial stress. Pt shares that he has a significant amount of credit card debt & has been working on paying them down but still has about 5 card maxed out. He expresses difficulty making payments due to SSDI as only income source. Pt's SI yesterday was triggered when he called his sister to ask for her help w/ pursuing bankruptcy. Pt says sister blew up at him & told him she hopes he goes to [facility]. A major argument escalated that result in exacerbation of pt's depression & SI. Pt's goal for stay is, \"To get better, get my " "medications fixed.\" He denies SI, HI, & A/V hallucinations at time of assessment.    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 Not working at this time. Enjoys going out places w/ his wife, watching TV.      Sources of Support:    Wife of 17 years Kimberlyn, mother- & father-in-law, brother-in-law     Prior Level of Function/Living Situation:    Activities of Daily Living/Self Care  Living Situation: lives w/ family - resides w/ wife   Hygiene/Grooming: independence  Bathing: independence  Dressing: independence  Toileting: independence  Continence: independence  Feeding: independence  Functional Mobility: independence  Medication Use/Follows Medical Advice: compliant  ADL Comment:  Reports recent sleep & appetite disturbances.     Instrumental Activities of Daily Living  Parenting/: n/a  Driving/Community Mobility: WFL  Financial Management: impaired - Significant stressor. Pt says he has various maxed out credit cards he is trying to pay off. States he has paid off 2-3 but still has around 5 more & struggles to make significant progress due to low income. Considering bankruptcy.   Physical Self-care : impaired  Emotional Self-care: impaired  Home Care/Chores: WFL  Cooking: WFL  Safety Judgement: WFL  Rest/Sleep: impaired  Education:     Work/Volunteering:   unemployed  IADL/Daily Routine Comment:   Receives SSDI income. Reports recent sleep & appetite disturbances.    Social Participation/Community Involvement:  Socializing: Pt alert, oriented, & attentive. Answers questions appropriately w/ fair eye contact. Constricted affect, but verbally becomes agitated when discussing argument w/ his sister; otherwise pt is cooperative & pleasant.  Balanced Relationships:  Pt has been  to wife Kimberlyn ~17 years & he reports they have a positive, supportive relationship.     Emotional Regulation Skills:  Emotional Expression:  Affect: constricted  Speech: regular rhythm, rate, " volume, & tone  Mood/Impulse Modulation: poor anxiety management, poor depression management, and poor anger management  Coping Skills:  Helpful: social support (including support groups) and exercise  Harmful: suicidal or homicidal ideation, avoidance, and externalizing  Hx 1 attempt via overdose around 2008    Cognitive & Task Performance Skills:  Orientation: person, place, time, and situation  Attention Span: sustained  Problem-solving: impaired  Decision-making: WFL  Thought Content: WFL  Thought Processes: coherent  Organizational Skills: thought processes are linear and organized  Short Term Memory: WFL  Remote Memory: WFL  Safety Judgement: WFL  Perseveration:  not present  Insight/Judgement:  WFL - mild impairments 2/2 mild ID however pt demonstrates good insight into his current mental health needs.    Communication and Interpersonal Skills:  Boundaries: mild impairments  Appropriate Disclosure: WFL  Assertion: Good w/ wife, tends to progress to aggression w/ sisters   Communication/Interpersonal Comment: Pt would benefit from assertive communication & interpersonal problem-solving training.      Goals:   Encounter Problems       Encounter Problems (Active)       OT Goals       Pt will explore, identify, and appropriately utilize effective coping strategies to cope with daily stressors and manage emotions with independence prior to discharge.        Start:  07/23/25    Expected End:  08/20/25            Pt will demonstrate ability to appropriately modulate emotions and impulses with independence prior to discharge.        Start:  07/23/25    Expected End:  08/20/25            Pt will explore, identify, and appropriately utilize effective communication strategies to express feelings, wants, and needs with independence prior to discharge.        Start:  07/23/25    Expected End:  08/20/25                 Education:  Pt provided overview of stay on inpatient psychiatric unit, including general expectations,  occupational therapy's role, and interdisciplinary approach to care. Pt verbalized understanding.

## 2025-07-23 NOTE — NURSING NOTE
"Admit Note- Report from Mei RN/ED. Patient is 55 year old male BIB wife (Kimberlyn) with complaint of suicidal ideation with plan to overdose on insulin. Patient identifies stressors as financial. Patiet has hx of x1 past attempt by overdose \"many, many years ago\". Patient open with [facility] and compliant with medications. Patient goals of admission are a medication review and adjustment. Patient endorsing recent appetite and sleep disturbances as well as an increase in anxiety and depression levels. Pt diagnosed with DM, recent medication change by his PCP to stop insulin coverage. Pt states he takes x1 weekly Trulicity and Actos. SSC given in ED for initial glucose of 501. Last glucose reading 182. Patient also given Geodon 40 mg prior to coming upstairs to [facility].   Patient identifies his wife as primary support person. Pt denies current SI and states he is able to approach staff if he has thoughts of self harm. Upon arrival to unit patient was cooperative with safety wanding and admission assessment. Skin check WNL with no open areas. Belongings inventoried and placed in closet. Pt requested PRN medication for anxiety and insomnia, Trazodone and Atarax given.   "

## 2025-07-23 NOTE — CARE PLAN
Problem: Pain - Adult  Goal: Verbalizes/displays adequate comfort level or baseline comfort level  Outcome: Progressing     Problem: Infection - Adult  Goal: Absence of infection at discharge  Outcome: Progressing  Goal: Absence of infection during hospitalization  Outcome: Progressing  Goal: Absence of fever/infection during anticipated neutropenic period  Outcome: Progressing     Problem: Safety - Adult  Goal: Free from fall injury  Outcome: Progressing     Problem: Discharge Planning  Goal: Discharge to home or other facility with appropriate resources  Outcome: Progressing     Problem: Chronic Conditions and Co-morbidities  Goal: Patient's chronic conditions and co-morbidity symptoms are monitored and maintained or improved  Outcome: Progressing     Problem: Nutrition  Goal: Nutrient intake appropriate for maintaining nutritional needs  Outcome: Progressing     Problem: Diabetes  Goal: Achieve decreasing blood glucose levels by end of shift  Outcome: Progressing  Goal: Increase stability of blood glucose readings by end of shift  Outcome: Progressing  Goal: Decrease in ketones present in urine by end of shift  Outcome: Progressing  Goal: Maintain electrolyte levels within acceptable range throughout shift  Outcome: Progressing  Goal: Maintain glucose levels >70mg/dl to <250mg/dl throughout shift  Outcome: Progressing  Goal: No changes in neurological exam by end of shift  Outcome: Progressing  Goal: Learn about and adhere to nutrition recommendations by end of shift  Outcome: Progressing  Goal: Vital signs within normal range for age by end of shift  Outcome: Progressing  Goal: Increase self care and/or family involovement by end of shift  Outcome: Progressing  Goal: Receive DSME education by end of shift  Outcome: Progressing     Problem: Potential for Harm to Self or Others  Goal: Cooperates with admission process  Outcome: Progressing  Goal: Participates in unit activities  Outcome: Progressing  Goal:  Patient/Family participate in treatment and discharge plans  Outcome: Progressing  Goal: Identifies deescalation techniques  Outcome: Progressing  Goal: Understands least restrictive measures  Outcome: Progressing  Goal: Identifies stressors that lead to harmful behaviors  Outcome: Progressing  Goal: Notifies staff when experiencing harmful thoughts toward self/others  Outcome: Progressing  Goal: Denies harm toward self or others  Outcome: Progressing  Goal: Free from restraint events  Outcome: Progressing     Problem: Anxiety  Goal: Patient/family understands admission protocols  Outcome: Progressing  Goal: Attempts to manage anxiety with help  Outcome: Progressing  Goal: Verbalizes ways to manage anxiety  Outcome: Progressing  Goal: Implements measures to reduce anxiety  Outcome: Progressing  Goal: Free from restraint events  Outcome: Progressing     Problem: Defensive Coping  Goal: Cooperates with admission process  Outcome: Progressing  Goal: Identifies reckless/dangerous behavior  Outcome: Progressing  Goal: Identifies stressors that lead to reckless/dangerous behavior  Outcome: Progressing  Goal: Discusses and identifies healthy coping skills  Outcome: Progressing  Goal: Demonstrates healthy coping skills  Outcome: Progressing  Goal: Identifies appropriate social interaction  Outcome: Progressing  Goal: Demonstrates appropriate social interactions  Outcome: Progressing  Goal: Patient/Family verbalizes awareness of resources  Outcome: Progressing  Goal: Discusses signs/symptoms of illness/treatment options  Outcome: Progressing  Goal: Patient/Family participate in treatment and discharge plans  Outcome: Progressing  Goal: Understands least restrictive measures  Outcome: Progressing  Goal: Free from restraint events  Outcome: Progressing   The patient's goals for the shift include      The clinical goals for the shift include sleep + 6 hours

## 2025-07-23 NOTE — NURSING NOTE
1530 - Patient visible on the unit. Positive attendance at group.Medication compliant. Wife visited the unit brought additional belongings. She expressed concern about his blood sugars fluctuating. Denies SI, HI and AVH. She wanted to know if he could be seen by his endocrinologist while admitted here. Informed her that I would relay this to oncoming staff.

## 2025-07-23 NOTE — ED PROVIDER NOTES
Emergency Medicine Transition of Care Note.    I received Carlyle Houser in signout from Dr. Ansari.  Please see the previous ED provider note for all HPI, PE and MDM up to the time of signout. This is in addition to the primary record.    In brief Carlyle Houser is an 55 y.o. male presenting for   Chief Complaint   Patient presents with    Suicidal     Pt. States he has been thinking of harming himself with a knife or to take all of his insulin.     At the time of signout we were awaiting: improvement of hyperglycemia    Diagnoses as of 07/23/25 0204   Suicidal ideation   Hyperglycemia       Medical Decision Making  Presented to me by the outgoing physician.  Patient has hyperglycemia without DKA.  Sliding scale insulin was initiated.  Patient is being accepted to [facility] once his blood sugar is below 200.    Patient received IV fluids and insulin and his blood sugar improved to the 180s.    Patient was transferred to [facility] for psychiatric hospitalization        Final diagnoses:   [R45.851] Suicidal ideation   [R73.9] Hyperglycemia           Procedure  Procedures    Carlos Romero, DO Carlos Romero DO  07/23/25 0206

## 2025-07-23 NOTE — CONSULTS
Consults    Reason For Consult  Adult medical examination and optimization for behavioral health unit      History Of Present Illness  Carlyle Houser is a 55 y.o. male PMH of DM2, htn, hld, asthma presents with suicidal ideations with plan to overdose on insulin and anxiety.  Glucose 501 sodium 133, creatinine 1. 35 BUN 25, WBCs 4.2, hemoglobin 12.9, hematocrit 37 urinalysis negative for infection, alcohol less than 10, urine toxicology negative, CT of head negative for acute processes.      Pt medically cleared for EPAT evaluation. Hospitalist team consulted for adult medical examination and optimization for behavioral health department.     Patient examined and seen. Alert and oriented x3, explained to patient assessment, right to , and the right to decline assessment. Patient verbalized understanding and agreed to assessment with Mona, patient denies suicidal ideations at this time.  Denies A/V hallucinations.  RN as . Patient denies chest pain, shortness of breath, palpitations, abdominal pain, fever or chills.      Hospitalist to evaluate medical optimization for psychiatric treatment and evaluation.  Neurological evaluation completed.  No acute or chronic medical issues identified at this time that could be contributing to underlying psychiatric symptoms. Pt is medically optimized for inpatient behavioral health evaluation and treatment.     Review of systems: 10 system were reviewed and were negative except what was mentioned in history of present illness        Past Medical History  He has a past medical history of Anxiety, Asthma, Bipolar 2 disorder (Multi), Diabetes mellitus (Multi), and Hypertension.    Surgical History  He has no past surgical history on file.     Social History  He reports that he has never smoked. He has never used smokeless tobacco. He reports that he does not drink alcohol and does not use drugs.    Family History  Family History[1]     Allergies  Patient has no  known allergies.       Physical Exam  Constitutional: Well developed, awake/alert/oriented x3, no distress, cooperative  Eyes: PERRL, EOMI, clear sclera  ENMT: mucous membranes moist, no apparent injury, no lesions seen  Head/Neck: Neck supple, no apparent injury, thyroid without mass or tenderness  Respiratory/Thorax: Patent airways,  normal breath sounds   Cardiovascular: Regular, rate and rhythm, no murmurs,  normal S 1and S 2  Gastrointestinal: Nondistended, soft, non-tender,    Genitourinary: denies CVA tenderness  or suprapubic tenderness,  voiding freely   Musculoskeletal: ROM intact, no joint swelling,   Extremities: normal extremities,  no contusions or wounds seen   Skin: warm, dry, intact  Neurological: alert/oriented x 3, speech clear, cranial nerves II through XII  grossly intact  Psychiatric: appropriate mood and behavior  Cranial Nerve Exam: II, III, IV, VI: Visual acuity within normal limits bilaterally, visual fields normal in all quadrants, NORRIS, EOMI  Cranial Nerve exam: V: Facial sensations intact bilaterally to dull, sharp, and light touch stimuli  Cranial Nerve exam VII: Facial muscle strength normal/equal bilaterally  Cranial Nerve Exam VIII: Hearing is normal bilaterally  Cranial Nerve IX,X: Palate and Uvula symmetrical, voice is normal  Cranial Nerve XI: Shoulder shrug strong, equal bilaterally  Cranial Nerve XII: Tongue moves symmetrically  Motor : Good muscle tone, Strength equal upper and lower extremities unless otherwise stated above  Cerebellar: normal gait unless otherwise stated above         Last Recorded Vitals  /58   Pulse 81   Temp 36.1 °C (97 °F)   Resp 18   Wt 72.6 kg (160 lb)   SpO2 98%     Relevant Results  Scheduled medications  Scheduled Medications[2]  Continuous medications  Continuous Medications[3]  PRN medications  PRN Medications[4]    Results for orders placed or performed during the hospital encounter of 07/23/25 (from the past 24 hours)   POCT GLUCOSE    Result Value Ref Range    POCT Glucose 139 (H) 74 - 99 mg/dL   POCT GLUCOSE   Result Value Ref Range    POCT Glucose 178 (H) 74 - 99 mg/dL   POCT GLUCOSE   Result Value Ref Range    POCT Glucose 277 (H) 74 - 99 mg/dL   POCT GLUCOSE   Result Value Ref Range    POCT Glucose 333 (H) 74 - 99 mg/dL          Assessment/Plan     # Suicidal Ideations  Anxiety  Bipolar Disorder   Admit to Behavioral Health Unit  Contract for Safety   Safety Protocols  Medications to be determined by psychiatry   Vital Signs BID  Group Therapy as appropriate  Social Work for outpatient therapy   Encourage Healthy Lifestyle and Exercise as appropriate    # Diabetes Mellitus Type 2  Continue home medications  Diet Cardiac/Diabetic  Continue Sliding Scale SSI AC/HS   A1C pending   Encourage healthy lifestyle changes  Resume oral diabetic medications   Increase sliding scale   Pending A1C may need to add Lantus     # HLD  Continue home medications     # Asthmas  Continue home medications     Thank you for consult  MEDICINE TO FOLLOW PERIPHERALLY DUE TO DIABETES  Call for acute needs    Time spent  60 minutes obtaining labs, imaging, recommendations, interview, assessment, examination, medication review/ordering, and EMR review.    Plan of care was discussed extensively with patient. Patient verbalized understanding through teach back method. All questions and concerns addressed upon examination.     Of note, this documentation is completed using the Dragon Dictation system (voice recognition software). There may be spelling and/or grammatical errors that were not corrected prior to final submission.      Azalea Tinsley, APRN-CNP             [1] No family history on file.  [2] atorvastatin, 10 mg, oral, Nightly  busPIRone, 15 mg, oral, TID  fluticasone furoate-vilanteroL, 1 puff, inhalation, Daily  insulin lispro, 0-5 Units, subcutaneous, TID AC  [START ON 7/24/2025] pioglitazone, 30 mg, oral, Daily  ziprasidone, 80 mg, oral, BID  [3]    [4]  PRN medications: acetaminophen, albuterol, dextrose, dextrose, diphenhydrAMINE **OR** diphenhydrAMINE, glucagon, glucagon, hydrOXYzine HCL, ibuprofen, magnesium hydroxide, OLANZapine **OR** OLANZapine, traZODone

## 2025-07-23 NOTE — H&P
The reason for admission includes: feeling depressed and feeling suicidal.  Onset of symptoms was abrupt starting 3 days ago with gradually worsening course since that time. Psychosocial Stressors: family and financial.        History Of Present Illness  Carlyle Houser is a 55 y.o. year old male patient with psych history of bipolar disorder who presents the ED with suicidal ideation with a plan to overdose on insulin.  Patient reports that he got into an argument with his sister.  He has been struggling with some chronic card debt and he was helping his sister would help him figure out the best way to paid off moving forward.  She started to scold him and speak negatively toward him.  He became upset, started to get anxious and his mood decreased significantly.  Started to experience suicidal ideation.  Patient ports he currently lives with his wife, describes are supportive.  States that prior to this event he has been doing okay on current dose of Geodon 80 mg oral twice daily and BuSpar 10 mg oral twice daily.  Denies any family history of psychiatric illness.  Denies any substance use.     Past Medical History  Medical History[1]    Past Psychiatric History:   Previous therapy: yes  Previous psychiatric hospitalizations: yes  Previous suicide attempts: yes  History of violence: no    Allergies  RX Allergies[2]     MSE  General: Appropriately groomed and dressed.  Appearance: Appears stated age.  Attitude: Calm, cooperative.  Behavior: Appropriate eye contact.  Motor activity: No agitation or retardation. no EPS.  Normal gait.  Speech: Regular rate, rhythm, volume and tone.  Mood: Depressed  Affect: Appropriate range  Thought process: Organized, linear, goal-directed.  Associations are logical.  Thought content: Does not endorse suicidal or homicidal ideation, no delusions elicited.  Thought perception: Did not endorse auditory or visual hallucinations.  Cognition: Alert, oriented x3.  no deficit in memory or  attention.  Insight: Fair.  Judgment: Fair.    Psychiatric Risk Assessment  Violence Risk Assessment: major mental illness and male  Acute Risk of Harm to Others is Considered: low   Suicide Risk Assessment: , current psychiatric illness, and male  Protective Factors against Suicide: adherence to  treatment, hopefulness/future orientation, marriage/partnership, and sense of responsibility toward family  Acute Risk of Harm to Self is Considered: moderate    Last Recorded Vitals  Vitals:    07/23/25 0700   BP: 100/58   Pulse: 81   Resp: 18   Temp: 36.1 °C (97 °F)   SpO2: 98%        OARRS Reviewed:yes    Relevant Results  Scheduled medications  Scheduled Medications[3]  Continuous medications  Continuous Medications[4]  PRN medications  PRN Medications[5]     Results for orders placed or performed during the hospital encounter of 07/23/25 (from the past 96 hours)   POCT GLUCOSE   Result Value Ref Range    POCT Glucose 139 (H) 74 - 99 mg/dL   POCT GLUCOSE   Result Value Ref Range    POCT Glucose 178 (H) 74 - 99 mg/dL   POCT GLUCOSE   Result Value Ref Range    POCT Glucose 277 (H) 74 - 99 mg/dL         Assessment/Plan   Principal Problem:  Bipolar disorder    Patient presented ED reporting suicidal ideation.  Patient stressors include financial difficulties and recent argument with his sister.  Patient states that prior to this recent argument he has been doing pretty well on Geodon 80 mg oral twice daily and BuSpar.  Does report increased anxiety.  Will increase BuSpar to 15 mg oral 3 times daily.  Patient in agreement with plan.    Impression:     Labs and Chart: reviewed   Case discussed with treatment team members  Encouraged patient to attend group and other mileu activity  Collateral from family - pending  Discharge planing  Medication:       - Reviewed. To continue as ordered    Medication Consent  Medication Consent: risks, benefits, side effects reviewed for all ordered meds and patient expressed  understanding and consent obtained    ALESIA Benito-CNP            [1]   Past Medical History:  Diagnosis Date    Anxiety     Asthma     Bipolar 2 disorder (Multi)     Diabetes mellitus (Multi)     Hypertension    [2] No Known Allergies  [3] atorvastatin, 10 mg, oral, Nightly  busPIRone, 15 mg, oral, TID  fluticasone furoate-vilanteroL, 1 puff, inhalation, Daily  insulin lispro, 0-5 Units, subcutaneous, TID AC  [START ON 7/24/2025] pioglitazone, 30 mg, oral, Daily  ziprasidone, 80 mg, oral, BID  [4]    [5] PRN medications: acetaminophen, albuterol, dextrose, dextrose, diphenhydrAMINE **OR** diphenhydrAMINE, glucagon, glucagon, hydrOXYzine HCL, ibuprofen, magnesium hydroxide, OLANZapine **OR** OLANZapine, traZODone

## 2025-07-23 NOTE — PROGRESS NOTES
Occupational Therapy     REHAB Therapy Assessment & Treatment    Patient Name: Carlyle Houser  MRN: [id number]  Today's Date: 7/23/2025      Activity:  Balanced Routine & Self-Care Group    Attendance:  Attendance  Activity: Discussion/reminisce  Participation: Active participation    Treatment Approach  Approach : Group therapy sessions  Patient Stated Goals: none stated  Cognition: Attention, Directions (Pt alert & oriented, mildly distractable - has some trouble sitting still & will leave group room & return. Demonstrates fair understanding.)  Social Skills: Demonstrates ability to listen to others  Emotional: Mood (Pt mood calm, affect constricted)  Treatment Approach Comments: Pt attended & participated in afternoon therapy group focused on self-care. First, pt educated on the importance of a balanced daily routine for stress management. Pt educated on 4 areas of activities to balance in everyday life, including work, self-care, leisure, & rest/sleep. Pt educated on the importance of maintaining all areas. Pt participated in discussion about balance, sharing some of his personal leisure activities like watching TV. Then, pt educated on tips for two specific areas of balance: sleep through review of “Sleep Hygiene” handout & self-care. Pt educated on various areas of self-care, including physical, psychological, social, spiritual, & professional. Pt given “Self-Care Assessment” & asked to rate how well they perform various activities within each category of self-care.  Pt educated on using results of the self-care assessment to inform self-care goals, & participated in discussion about barriers to self-care goals & ways to overcome them. Pt receptive & demonstrates fair understanding.      Encounter Problems       Encounter Problems (Active)       OT Goals       Pt will explore, identify, and appropriately utilize effective coping strategies to cope with daily stressors and manage emotions with independence prior  to discharge.  (Progressing)       Start:  07/23/25    Expected End:  08/20/25            Pt will demonstrate ability to appropriately modulate emotions and impulses with independence prior to discharge.  (Progressing)       Start:  07/23/25    Expected End:  08/20/25            Pt will explore, identify, and appropriately utilize effective communication strategies to express feelings, wants, and needs with independence prior to discharge.  (Progressing)       Start:  07/23/25    Expected End:  08/20/25                 Education:  Pt given educational handouts on balanced routines & self-care. Reviewed & discussed. Pt demonstrates fair understanding.

## 2025-07-23 NOTE — PROGRESS NOTES
EPAT - Social Work Psychiatric Assessment    Arrival Details  Mode of Arrival: Ambulatory  Admission Source: Home  Admission Type: Voluntary  EPAT Assessment Start Date: 07/22/25  EPAT Assessment Start Time: 1732  Name of : Corina GALVEZ    History of Present Illness  Admission Reason: Increased depression and anxiety  HPI:     54 y/o male seen via telehealth at [facility] for a psychiatric evaluation. Pt came to the Ed with his wife. He presents with increases anxiety and depression. Mental health history of anxiety, depression and bipolar. Pt chart, provider note and CSSR were reviewed prior to assessment. He expressed suicidal ideation with the plan to overdose on insulin.  Denies homicidal ideation, hallucinations, delusional or paranoia. No access to firearms or deadly weapons. Previous suicide attempts many years ago, unable to recall. Currently being seen by [facility]. Next psychiatric appt July 24.       Readmission Information   Readmission within 30 Days: No    Psychiatric Symptoms  Anxiety Symptoms: Generalized, Panic attack, Feelings of doom  Depression Symptoms: Change in energy level, Crying, Feelings of hopelessess, Increased irritability  Melissa Symptoms: Flight of ideas    Psychosis Symptoms  Hallucination Type: No problems reported or observed.  Delusion Type: No problems reported or observed.    Additional Symptoms - Adult  Generalized Anxiety Disorder: Difficult to control worry, Difficulity concentrating, Excessive anxiety/worry  Obsessive Compulsive Disorder: No problems reported or observed.  Panic Attack: No problems reported or observed.  Post Traumatic Stress Disorder: No problems reported or observed.  Delirium: No problems reported or observed.    Past Psychiatric History/Meds/Treatments  Past Psychiatric History: Hx of biploar and depression  Past Psychiatric Meds/Treatments: Geodon, Prozac and Buspar  Past Violence/Victimization History: Denies    Current  Mental Health Contacts   Name/Phone Number: [facility] [phone]   Last Appointment Date: Next appt 7/24    Support System: Immediate family    Living Arrangement: House    Home Safety  Feels Safe Living in Home: Yes  Home Safety : Feels safe at home    Income Information  Employment Status for: Patient  Employment Status: Disabled  Income Source: Disability  Current/Previous Occupation: Unable to Assess  Income/Expense Information: Expenses exceed income  Financial Concerns: Unable to Pay Bills, Transportation Costs  Who Manages Finances if Patient Unable: Wife  Employment/ Finance Comments: Stressed bout fiances issues in the home    [employer] Service/Education History  Current or Previous  Service: None    Social/Cultural History  Social History: Pt is disabled  Cultural Requests During Hospitalization: Denies  Spiritual Requests During Hospitalization: Denies  Important Activities: Family    Legal  Legal Considerations: Patient/ Family Ability to Make Healthcare Decisions  Legal Concerns: Denies    Drug Screening  Have you used any substances (canabis, cocaine, heroin, hallucinogens, inhalants, etc.) in the past 12 months?: No  Have you used any prescription drugs other than prescribed in the past 12 months?: No  Is a toxicology screen needed?: Yes              Orientation  Orientation Level: Oriented X4                   Risk Factors  Self Harm/Suicidal Ideation Plan: Overdose on insulin  Previous Self Harm/Suicidal Plans: Overdose on pills mnay years ago  Risk Factors: None    Violence Risk Assessment  Assessment of Violence: None noted  Thoughts of Harm to Others: No    Ability to Assess Risk Screen  Risk Screen - Ability to Assess: Able to be screened  Ask Suicide-Screening Questions  1. In the past few weeks, have you wished you were dead?: Yes  2. In the past few weeks, have you felt that you or your family would be better off if you were dead?:  "Yes  3. In the past week, have you been having thoughts about killing yourself?: Yes  4. Have you ever tried to kill yourself?: Yes  How did you try to kill yourself?: Overdose  When did you try to kill yourself?: \" Many years ago\"  5. Are you having thoughts of killing yourself right now?: No  Calculated Risk Score: Potential Risk  [place] Suicide Severity Rating Scale (Screener/Recent Self-Report)  1. Wish to be Dead (Past 1 Month): Yes  2. Non-Specific Active Suicidal Thoughts (Past 1 Month): Yes  3. Active Suicidal Ideation with any Methods (Not Plan) Without Intent to Act (Past 1 Month): Yes  4. Active Suicidal Ideation with Some Intent to Act, Without Specific Plan (Past 1 Month): No  5. Active Suicidal Ideation with Specific Plan and Intent (Past 1 Month): Yes  6. Suicidal Behavior (Lifetime): Yes  6. Suicidal Behavior (3 Months): No  6. Suicidal Behavior (Description): Overdose  Calculated C-SSRS Risk Score (Lifetime/Recent): High Risk  Step 1: Risk Factors  Current & Past Psychiatric Dx: Mood disorder, Recent onset  Presenting Symptoms: Hopelessness or despair, Anxiety and/or panic  Change in Treatment: Other (Comment) (No change compliant with treatment)  Access to Lethal Methods : No  Step 2: Protective Factors   Protective Factors Internal: Identifies reasons for living  Protective Factors External: Positive therapeutic relationships, Supportive social network or family or friends  Step 3: Suicidal Ideation Intensity  Most Severe Suicidal Ideation Identified: Overdose  How Many Times Have You Had These Thoughts: 2-5 times in a week  When You Have the Thoughts How Long do They Last : 1-4 hours/a lot of the time  Could/Can You Stop Thinking About Killing Yourself or Wanting to Die if You Want to: Can control thoughts with a lot of difficulty  Are There Things - Anyone or Anything - That Stopped You From Wanting to Die or Acting on: Deterrents probably stopped you  What Sort of Reasons Did You Have For " Thinking About Wanting to Die or Killing Yourself: Completely to end or stop the pain (you couldn't go on living with the pain or how you were feeling)  Total Score: 17  Step 5: Documentation  Risk Level: Moderate suicide risk    Psychiatric Impression and Plan of Care  Assessment and Plan:     54 y/o presents engaged, tearful but forthcoming. He expressed feeling overwhelmed, anxious, sad and irritable. He described feeling on edge the last few days since having a verbal altercation with his sister. After the agreement yesterday he expressed his feelings of angry, today they had an argument again and felt as if he wanted to end his life by taking insulin or cutting himself. Pt shares that he is having financial issues and was seek help but instead He admits to having insulin in the home and but does not want to act on it which is why he came to the ED. Pt  expressed wanting to yell and stomach upset due to being so on edge.        Based on his current presentation and symptoms he is being recommended for psychiatric placement. Provider agrees with recommendation.      Specific Resources Provided to Patient: Hospitalization  CM Notified: No  PHP/IOP Recommended: No  Specific Information Provided for PHP/IOP: No  Plan Comments: Wants to be referred to [facility]    Outcome/Disposition  Patient's Perception of Outcome Achieved: Agrees with recommendation  Assessment, Recommendations and Risk Level Reviewed with: Isaiah Ansari DO  Contact Name: Kimberlyn Houser  Contact Number(s): [phone]  Contact Relationship: Pt's wife  EPAT Assessment Completed Date: 07/22/25

## 2025-07-23 NOTE — GROUP NOTE
Group Topic: Self-Care/Wellness   Group Date: 7/23/2025  Start Time: 1415  End Time: 3324  Facilitators: Mona Matta RN   Department: [place] [address] [facility]    Number of Participants: 5   Group Focus: acceptance, affirmation, anxiety, and coping skills  Treatment Modality: Patient-Centered Therapy  Interventions utilized were patient education  Purpose: coping skills    Name: Carlyle Houser YOB: 1970   MR: [id number]      Facilitator: Registered Nurse  Level of Participation: active  Quality of Participation: appropriate/pleasant  Interactions with others: appropriate  Mood/Affect: appropriate  Triggers (if applicable): na  Cognition: coherent/clear  Progress: Moderate  Comments: na  Plan: continue with services

## 2025-07-24 LAB
EST. AVERAGE GLUCOSE BLD GHB EST-MCNC: 192 MG/DL
GLUCOSE BLD MANUAL STRIP-MCNC: 170 MG/DL (ref 74–99)
GLUCOSE BLD MANUAL STRIP-MCNC: 248 MG/DL (ref 74–99)
GLUCOSE BLD MANUAL STRIP-MCNC: 307 MG/DL (ref 74–99)
GLUCOSE BLD MANUAL STRIP-MCNC: 98 MG/DL (ref 74–99)
HBA1C MFR BLD: 8.3 % (ref ?–5.7)

## 2025-07-24 PROCEDURE — 2500000001 HC RX 250 WO HCPCS SELF ADMINISTERED DRUGS (ALT 637 FOR MEDICARE OP): Performed by: PSYCHIATRY & NEUROLOGY

## 2025-07-24 PROCEDURE — 2500000002 HC RX 250 W HCPCS SELF ADMINISTERED DRUGS (ALT 637 FOR MEDICARE OP, ALT 636 FOR OP/ED): Performed by: REGISTERED NURSE

## 2025-07-24 PROCEDURE — 2500000001 HC RX 250 WO HCPCS SELF ADMINISTERED DRUGS (ALT 637 FOR MEDICARE OP): Performed by: REGISTERED NURSE

## 2025-07-24 PROCEDURE — 82947 ASSAY GLUCOSE BLOOD QUANT: CPT

## 2025-07-24 PROCEDURE — 1240000001 HC SEMI-PRIVATE BH ROOM DAILY

## 2025-07-24 PROCEDURE — 2500000002 HC RX 250 W HCPCS SELF ADMINISTERED DRUGS (ALT 637 FOR MEDICARE OP, ALT 636 FOR OP/ED): Performed by: NURSE PRACTITIONER

## 2025-07-24 PROCEDURE — 2500000002 HC RX 250 W HCPCS SELF ADMINISTERED DRUGS (ALT 637 FOR MEDICARE OP, ALT 636 FOR OP/ED): Performed by: INTERNAL MEDICINE

## 2025-07-24 PROCEDURE — 99232 SBSQ HOSP IP/OBS MODERATE 35: CPT | Performed by: PSYCHIATRY & NEUROLOGY

## 2025-07-24 PROCEDURE — 97150 GROUP THERAPEUTIC PROCEDURES: CPT | Mod: GO

## 2025-07-24 RX ORDER — INSULIN LISPRO 100 [IU]/ML
0-15 INJECTION, SOLUTION INTRAVENOUS; SUBCUTANEOUS
Status: DISCONTINUED | OUTPATIENT
Start: 2025-07-24 | End: 2025-07-25

## 2025-07-24 RX ORDER — HYDROXYZINE HYDROCHLORIDE 25 MG/1
25 TABLET, FILM COATED ORAL EVERY 6 HOURS PRN
Status: DISCONTINUED | OUTPATIENT
Start: 2025-07-24 | End: 2025-07-24

## 2025-07-24 RX ORDER — INSULIN GLARGINE 100 [IU]/ML
10 INJECTION, SOLUTION SUBCUTANEOUS DAILY
Status: DISCONTINUED | OUTPATIENT
Start: 2025-07-24 | End: 2025-07-25 | Stop reason: HOSPADM

## 2025-07-24 RX ADMIN — INSULIN LISPRO 2 UNITS: 100 INJECTION, SOLUTION INTRAVENOUS; SUBCUTANEOUS at 08:43

## 2025-07-24 RX ADMIN — ZIPRASIDONE HYDROCHLORIDE 80 MG: 40 CAPSULE ORAL at 08:29

## 2025-07-24 RX ADMIN — BUSPIRONE HYDROCHLORIDE 15 MG: 5 TABLET ORAL at 08:29

## 2025-07-24 RX ADMIN — ATORVASTATIN CALCIUM 10 MG: 10 TABLET ORAL at 20:20

## 2025-07-24 RX ADMIN — FLUTICASONE FUROATE AND VILANTEROL TRIFENATATE 1 PUFF: 100; 25 POWDER RESPIRATORY (INHALATION) at 08:29

## 2025-07-24 RX ADMIN — INSULIN LISPRO 8 UNITS: 100 INJECTION, SOLUTION INTRAVENOUS; SUBCUTANEOUS at 12:33

## 2025-07-24 RX ADMIN — HYDROXYZINE HYDROCHLORIDE 50 MG: 25 TABLET ORAL at 11:19

## 2025-07-24 RX ADMIN — TRAZODONE HYDROCHLORIDE 50 MG: 50 TABLET ORAL at 20:20

## 2025-07-24 RX ADMIN — ACETAMINOPHEN 650 MG: 325 TABLET ORAL at 20:34

## 2025-07-24 RX ADMIN — INSULIN LISPRO 6 UNITS: 100 INJECTION, SOLUTION INTRAVENOUS; SUBCUTANEOUS at 17:27

## 2025-07-24 RX ADMIN — PIOGLITAZONE HYDROCHLORIDE 30 MG: 30 TABLET ORAL at 08:29

## 2025-07-24 RX ADMIN — BUSPIRONE HYDROCHLORIDE 15 MG: 5 TABLET ORAL at 20:20

## 2025-07-24 RX ADMIN — ZIPRASIDONE HYDROCHLORIDE 80 MG: 40 CAPSULE ORAL at 17:32

## 2025-07-24 RX ADMIN — BUSPIRONE HYDROCHLORIDE 15 MG: 5 TABLET ORAL at 15:26

## 2025-07-24 ASSESSMENT — PAIN SCALES - GENERAL
PAINLEVEL_OUTOF10: 4
PAINLEVEL_OUTOF10: 0 - NO PAIN

## 2025-07-24 ASSESSMENT — PAIN - FUNCTIONAL ASSESSMENT
PAIN_FUNCTIONAL_ASSESSMENT: 0-10

## 2025-07-24 ASSESSMENT — PAIN DESCRIPTION - DESCRIPTORS: DESCRIPTORS: ACHING

## 2025-07-24 NOTE — CARE PLAN
Pt states his anxiety is 2/10 depression is4/10    Pt compliant with meds  Pt denies all   Had appropriate visit with wife  Pt educated on making healthier snack choices in order to keep his glucose under control.            The patient's goals for the shift include get my sugars under control    The clinical goals for the shift include group    Over the shift, the patient did not make progress toward the following goals. Barriers to progression include clinical presentation. Recommendations to address these barriers include med compliance.

## 2025-07-24 NOTE — PROGRESS NOTES
Met with patient and wife open to DE. Patient has been on insulin in the past including sliding scale. Patient had a CGM Wei 2 but stopped being covered when he went off insulin. Patient reported having significant hypoglycemic events in the past. Patient open to discharging on insulin with CGM. Discharge medications to be determined.

## 2025-07-24 NOTE — PROGRESS NOTES
"Occupational Therapy     REHAB Therapy Assessment & Treatment    Patient Name: Carlyle Cervantes  MRN: [id number]  Today's Date: 7/24/2025    Attendance:  Attendance  Activity: Discussion/reminisce (OT GRP (PM): \"Practice Makes Better\" relaxation skills info handout was reviewed.)  Participation: Active participation    Therapeutic OT GRP  Treatment Approach  Approach : Group therapy sessions  Patient Stated Goals: none stated  Cognition: Attention (Pt attentive & focused for the 1st half of grp, but dozed off to sleep during the last 10min of session. Later, Pt noted his meds made him more sleepy.)  Social Skills: Cooperates with others in group activity  Emotional: Behaviors (Pt is calm and gave good eye contact. As OT discussed relaxtion skills, Pt nodded his understanding.)  Stress Management/Relaxation Training: Performs stress management/relaxation techniques  Treatment Approach Comments: OT GRP (PM): \"Practice Makes Better\" relaxation skills info handout was reviewed. Pt was educated on the relaxation techniques of the \"Life Saver\" Grounding technique, PLB, Guided Imagery & Progressive Muscle tensing/release, Pts were taught how to use Visualization to incorporate all 5 senses in calming oneself. Next Pts practiced Progressive Muscle tensing/release to gently decrease muscle pressure & tightness with tensing/release while using PLB to safely decrease panic. In addition, as part of relaxation skills development, Pts practiced (+) self-talk phrases a distraction technique. Counting backwards from 100 or saying the Alphabet backwards was also discussed as (+) tools to distract oneself from irrational, (-) thinking. Journaling was talked about as an easy sebas to get (-) thoughts out of one’s head to release anxiety especially at night before going to bed for better sleep. All strategies reviewed & discussed as Pts noted the use of 2 or more skills as more effective than just relying one tool to effectively calm oneself. " Pt receptive in using distress tolerance skills during daily life activities. All of these techniques were discussed in group as well as practiced to safely decrease anger & to safely decrease panic. However, Pt dozed off to sleep the last 10mins, missing the recap of grp tecgniques. later, Pt noted that he felt increased sleepiness possibly due to Pt's meds, per Pt. He receptive & engaged in discussion of using distress tolerance skills during daily life activities.      Encounter Problems       Encounter Problems (Active)       OT Goals       Pt will explore, identify, and appropriately utilize effective coping strategies to cope with daily stressors and manage emotions with independence prior to discharge.  (Progressing)       Start:  07/23/25    Expected End:  08/20/25            Pt will demonstrate ability to appropriately modulate emotions and impulses with independence prior to discharge.  (Progressing)       Start:  07/23/25    Expected End:  08/20/25            Pt will explore, identify, and appropriately utilize effective communication strategies to express feelings, wants, and needs with independence prior to discharge.  (Progressing)       Start:  07/23/25    Expected End:  08/20/25                     Education Documentation  No documentation found.  Education Comments  No comments found.          Additional Comments:

## 2025-07-24 NOTE — GROUP NOTE
Group Topic: Cognitive Focus   Group Date: 7/24/2025  Start Time: 1415  End Time: 1500  Facilitators: LENNY Winter   Department[facility]    Number of Participants: 4   Group Focus: communication  Treatment Modality: Cognitive Behavioral Therapy  Interventions utilized were assignment  Purpose: maladaptive thinking    Name: Carlyle Houser YOB: 1970   MR: [id number]      Facilitator:   Level of Participation: minimal  Quality of Participation: appropriate/pleasant  Interactions with others: appropriate  Mood/Affect: appropriate  Triggers (if applicable): n/a  Cognition: n/a  Progress: Minimal  Comments: Carlyle shared minimal thoughts with group with encouragement.   Plan: continue with services

## 2025-07-24 NOTE — PROGRESS NOTES
"Carlyle Houser is a 55 y.o. male on day 1 of admission presenting with bipolar disorder.    Subjective   Patient ports improved mood.  States he did have some anxiety last night, missing his wife.  States she is very supportive.  She struggles with her own mental health issues and they support each other through difficult times.  They both receive outpatient psych care through the [facility].  Patient encouraged to utilize hydroxyzine when feeling anxious.    Objective     MSE  General: Appropriately groomed and dressed.  Appearance: Appears stated age.  Attitude: Calm, cooperative.  Behavior: Appropriate eye contact.  Motor activity: No agitation or retardation. no EPS.  Normal gait.  Speech: Regular rate, rhythm, volume and tone.  Mood: Anxious but improving  Affect: Appropriate range  Thought process: Organized, linear, goal-directed.  Associations are logical.  Thought content: Does not endorse suicidal or homicidal ideation, no delusions elicited.  Thought perception: Did not endorse auditory or visual hallucinations.  Cognition: Alert, oriented x3.  no deficit in memory or attention.  Insight: Fair.  Judgment: Fair.    Last Recorded Vitals  /61   Pulse 78   Temp 36.4 °C (97.5 °F)   Resp 18   Ht 1.676 m (5' 5.98\")   Wt 72.6 kg (160 lb)   SpO2 100%   BMI 25.84 kg/m²      Relevant Results  Scheduled medications  Scheduled Medications[1]  Continuous medications  Continuous Medications[2]  PRN medications  PRN Medications[3]    Results for orders placed or performed during the hospital encounter of 07/23/25 (from the past 24 hours)   POCT GLUCOSE   Result Value Ref Range    POCT Glucose 333 (H) 74 - 99 mg/dL   POCT GLUCOSE   Result Value Ref Range    POCT Glucose 285 (H) 74 - 99 mg/dL   POCT GLUCOSE   Result Value Ref Range    POCT Glucose 170 (H) 74 - 99 mg/dL   POCT GLUCOSE   Result Value Ref Range    POCT Glucose 307 (H) 74 - 99 mg/dL        Assessment/Plan   Diagnosis:  Bipolar " depression    Impression:     Labs and Chart: reviewed  Case discussed with treatment team members  Encouraged patient to attend group and other mileu activity  Collateral from family - pending  Discharge planing  Medication:       - Reviewed. To continue as ordered    Medication Consent  Medication Consent: no medication changes necessary for review    ALESIA Benito-CNP          [1] atorvastatin, 10 mg, oral, Nightly  busPIRone, 15 mg, oral, TID  fluticasone furoate-vilanteroL, 1 puff, inhalation, Daily  insulin glargine, 10 Units, subcutaneous, Daily  insulin lispro, 0-15 Units, subcutaneous, Before meals & nightly  pioglitazone, 30 mg, oral, Daily  ziprasidone, 80 mg, oral, BID  [2]    [3] PRN medications: acetaminophen, albuterol, dextrose, dextrose, diphenhydrAMINE **OR** diphenhydrAMINE, glucagon, glucagon, hydrOXYzine HCL, ibuprofen, magnesium hydroxide, OLANZapine **OR** OLANZapine, traZODone

## 2025-07-24 NOTE — CARE PLAN
Problem: Pain - Adult  Goal: Verbalizes/displays adequate comfort level or baseline comfort level  Outcome: Progressing     Problem: Infection - Adult  Goal: Absence of infection at discharge  Outcome: Progressing  Goal: Absence of infection during hospitalization  Outcome: Progressing  Goal: Absence of fever/infection during anticipated neutropenic period  Outcome: Progressing     Problem: Safety - Adult  Goal: Free from fall injury  Outcome: Progressing     Problem: Discharge Planning  Goal: Discharge to home or other facility with appropriate resources  Outcome: Progressing     Problem: Chronic Conditions and Co-morbidities  Goal: Patient's chronic conditions and co-morbidity symptoms are monitored and maintained or improved  Outcome: Progressing     Problem: Nutrition  Goal: Nutrient intake appropriate for maintaining nutritional needs  Outcome: Progressing     Problem: Diabetes  Goal: Achieve decreasing blood glucose levels by end of shift  Outcome: Progressing  Goal: Increase stability of blood glucose readings by end of shift  Outcome: Progressing  Goal: Decrease in ketones present in urine by end of shift  Outcome: Progressing  Goal: Maintain electrolyte levels within acceptable range throughout shift  Outcome: Progressing  Goal: Maintain glucose levels >70mg/dl to <250mg/dl throughout shift  Outcome: Progressing  Goal: No changes in neurological exam by end of shift  Outcome: Progressing  Goal: Learn about and adhere to nutrition recommendations by end of shift  Outcome: Progressing  Goal: Vital signs within normal range for age by end of shift  Outcome: Progressing  Goal: Increase self care and/or family involovement by end of shift  Outcome: Progressing  Goal: Receive DSME education by end of shift  Outcome: Progressing     Problem: Potential for Harm to Self or Others  Goal: Cooperates with admission process  Outcome: Progressing  Goal: Participates in unit activities  Outcome: Progressing  Goal:  Patient/Family participate in treatment and discharge plans  Outcome: Progressing  Goal: Identifies deescalation techniques  Outcome: Progressing  Goal: Understands least restrictive measures  Outcome: Progressing  Goal: Identifies stressors that lead to harmful behaviors  Outcome: Progressing  Goal: Notifies staff when experiencing harmful thoughts toward self/others  Outcome: Progressing  Goal: Denies harm toward self or others  Outcome: Progressing  Goal: Free from restraint events  Outcome: Progressing     Problem: Anxiety  Goal: Patient/family understands admission protocols  Outcome: Progressing  Goal: Attempts to manage anxiety with help  Outcome: Progressing  Goal: Verbalizes ways to manage anxiety  Outcome: Progressing  Goal: Implements measures to reduce anxiety  Outcome: Progressing  Goal: Free from restraint events  Outcome: Progressing     Problem: Defensive Coping  Goal: Cooperates with admission process  Outcome: Progressing  Goal: Identifies reckless/dangerous behavior  Outcome: Progressing  Goal: Identifies stressors that lead to reckless/dangerous behavior  Outcome: Progressing  Goal: Discusses and identifies healthy coping skills  Outcome: Progressing  Goal: Demonstrates healthy coping skills  Outcome: Progressing  Goal: Identifies appropriate social interaction  Outcome: Progressing  Goal: Demonstrates appropriate social interactions  Outcome: Progressing  Goal: Patient/Family verbalizes awareness of resources  Outcome: Progressing  Goal: Discusses signs/symptoms of illness/treatment options  Outcome: Progressing  Goal: Patient/Family participate in treatment and discharge plans  Outcome: Progressing  Goal: Understands least restrictive measures  Outcome: Progressing  Goal: Free from restraint events  Outcome: Progressing     Problem: Risk for Suicide  Goal: Accepts medications as prescribed/needed this shift  Outcome: Progressing  Goal: Identifies supports this shift  Outcome: Progressing  Goal:  "Makes needs known through verbalization or behaviors this shift  Outcome: Progressing  Goal: No self harm this shift  Outcome: Progressing  Goal: Read Safety Guidelines this shift  Outcome: Progressing  Goal: Complete Mental Health Safety Plan (psychiatry only) this shift  Outcome: Progressing   The patient's goals for the shift include sleep >6 hours    The clinical goals for the shift include patient glucose will be within normal range    Patient visible on unit. Affect brightens upon approach. Pt reports \"I feel a lot better today\" Pt rating anxiety and depression levels both 3/10 (with 10 being the highest) Pt denies SI, HI and AVH. Patient compliant with medications and participating in treatment.  Pt showered this evening. No additional needs verbalized to staff.   "

## 2025-07-24 NOTE — SIGNIFICANT EVENT
INTERNAL MEDICINe  Review of glucose elevated 200-300s  A1C 8.3  Will increase insulin lispro sliding scale and add lantus 10 units.   Monitor overnight       07/24/25 at 1:34 PM - ALESIA England-CNP

## 2025-07-24 NOTE — PROGRESS NOTES
"Social Work Note    LENNY called wife, Kimberlyn at [phone], to obtain additional family collateral and to address any concerns or questions that she may have. Wife shared, \"I honestly feel that he has been doing better\" and \"I think he is ready\". Wife has no other questions or concerns at this time. Wife is aware that pt may be discharged tomorrow.   "

## 2025-07-25 ENCOUNTER — PHARMACY VISIT (OUTPATIENT)
Dept: PHARMACY | Facility: CLINIC | Age: 55
End: 2025-07-25
Payer: MEDICARE

## 2025-07-25 ENCOUNTER — DOCUMENTATION (OUTPATIENT)
Dept: INPATIENT UNIT | Facility: HOSPITAL | Age: 55
End: 2025-07-25
Payer: MEDICARE

## 2025-07-25 VITALS
DIASTOLIC BLOOD PRESSURE: 64 MMHG | HEIGHT: 66 IN | OXYGEN SATURATION: 99 % | WEIGHT: 160 LBS | RESPIRATION RATE: 18 BRPM | HEART RATE: 89 BPM | TEMPERATURE: 97.5 F | BODY MASS INDEX: 25.71 KG/M2 | SYSTOLIC BLOOD PRESSURE: 100 MMHG

## 2025-07-25 LAB
GLUCOSE BLD MANUAL STRIP-MCNC: 124 MG/DL (ref 74–99)
GLUCOSE BLD MANUAL STRIP-MCNC: 227 MG/DL (ref 74–99)
GLUCOSE BLD MANUAL STRIP-MCNC: 262 MG/DL (ref 74–99)
GLUCOSE BLD MANUAL STRIP-MCNC: 55 MG/DL (ref 74–99)
GLUCOSE BLD MANUAL STRIP-MCNC: 92 MG/DL (ref 74–99)

## 2025-07-25 PROCEDURE — 97150 GROUP THERAPEUTIC PROCEDURES: CPT | Mod: GO

## 2025-07-25 PROCEDURE — RXMED WILLOW AMBULATORY MEDICATION CHARGE

## 2025-07-25 PROCEDURE — 99239 HOSP IP/OBS DSCHRG MGMT >30: CPT | Performed by: PSYCHIATRY & NEUROLOGY

## 2025-07-25 PROCEDURE — 2500000002 HC RX 250 W HCPCS SELF ADMINISTERED DRUGS (ALT 637 FOR MEDICARE OP, ALT 636 FOR OP/ED): Performed by: REGISTERED NURSE

## 2025-07-25 PROCEDURE — 82947 ASSAY GLUCOSE BLOOD QUANT: CPT

## 2025-07-25 PROCEDURE — 2500000002 HC RX 250 W HCPCS SELF ADMINISTERED DRUGS (ALT 637 FOR MEDICARE OP, ALT 636 FOR OP/ED): Performed by: INTERNAL MEDICINE

## 2025-07-25 PROCEDURE — 2500000001 HC RX 250 WO HCPCS SELF ADMINISTERED DRUGS (ALT 637 FOR MEDICARE OP): Performed by: PSYCHIATRY & NEUROLOGY

## 2025-07-25 RX ORDER — INSULIN LISPRO 100 [IU]/ML
0-10 INJECTION, SOLUTION INTRAVENOUS; SUBCUTANEOUS
Status: DISCONTINUED | OUTPATIENT
Start: 2025-07-25 | End: 2025-07-25 | Stop reason: HOSPADM

## 2025-07-25 RX ORDER — PEN NEEDLE, DIABETIC 30 GX3/16"
NEEDLE, DISPOSABLE MISCELLANEOUS
Qty: 100 EACH | Refills: 0 | OUTPATIENT
Start: 2025-07-25

## 2025-07-25 RX ORDER — INSULIN LISPRO 100 [IU]/ML
0-10 INJECTION, SOLUTION INTRAVENOUS; SUBCUTANEOUS
Qty: 15 ML | Refills: 2 | Status: SHIPPED | OUTPATIENT
Start: 2025-07-25

## 2025-07-25 RX ORDER — HYDROXYZINE HYDROCHLORIDE 50 MG/1
50 TABLET, FILM COATED ORAL 2 TIMES DAILY PRN
Qty: 60 TABLET | Refills: 0 | Status: SHIPPED | OUTPATIENT
Start: 2025-07-25 | End: 2025-08-24

## 2025-07-25 RX ORDER — BUSPIRONE HYDROCHLORIDE 15 MG/1
15 TABLET ORAL 3 TIMES DAILY
Qty: 90 TABLET | Refills: 0 | Status: SHIPPED | OUTPATIENT
Start: 2025-07-25 | End: 2025-08-24

## 2025-07-25 RX ORDER — TRAZODONE HYDROCHLORIDE 50 MG/1
50 TABLET ORAL NIGHTLY PRN
Qty: 30 TABLET | Refills: 0 | Status: SHIPPED | OUTPATIENT
Start: 2025-07-25 | End: 2025-08-24

## 2025-07-25 RX ADMIN — INSULIN LISPRO 9 UNITS: 100 INJECTION, SOLUTION INTRAVENOUS; SUBCUTANEOUS at 08:42

## 2025-07-25 RX ADMIN — INSULIN GLARGINE 10 UNITS: 100 INJECTION, SOLUTION SUBCUTANEOUS at 08:43

## 2025-07-25 RX ADMIN — PIOGLITAZONE HYDROCHLORIDE 30 MG: 30 TABLET ORAL at 08:43

## 2025-07-25 RX ADMIN — BUSPIRONE HYDROCHLORIDE 15 MG: 5 TABLET ORAL at 08:43

## 2025-07-25 RX ADMIN — ZIPRASIDONE HYDROCHLORIDE 80 MG: 40 CAPSULE ORAL at 08:43

## 2025-07-25 ASSESSMENT — PAIN - FUNCTIONAL ASSESSMENT: PAIN_FUNCTIONAL_ASSESSMENT: 0-10

## 2025-07-25 ASSESSMENT — PAIN SCALES - GENERAL: PAINLEVEL_OUTOF10: 0 - NO PAIN

## 2025-07-25 NOTE — NURSING NOTE
"Patient is known brittle diabetic that has had several episodes of severe hypoglycemia resulting hospitalization and was subsequently taken off insulin. Patient will be discharged on sliding scale only. [facility] on treatment of hypoglycemia using the \"Rule of 15\". Patient verbalized understanding and was able to name food items and drinks that would be 15 gram of carbohydrates. Encouraged patient and wife to keep 1/2 a cup of orange juice at the bedside and to carry gummies or candy on his person. This [facility] provided review of insulin pen set up and injection technique using demo pen, pen needle and skin pad. Patient was able to verbalize how many units of insulin to give according BG reading and sliding scale. Importance of site roation explained. Patient currently has a hard lump on left side of abdomen d/t previously not rotating injection sites. Fullscreen 3 Freestyle zulay downloaded on phone and [email or web address] zulay downloaded on wife's phone. Wei Zulay education provided. Wei 3 Plus sensor placed on the back of the left arm and activated. Secure chat sent to Dr. Murphy for follow up appointment and update on patient's discharge. Appointment date and time pending.   rime insulin pen with 2 units and discard before each injection?          This your mealtime insulin Lispro. Check your blood sugar before each meal and give yourself _____units according to your sliding scale: If blood sugar is:     Picture     Hypoglycemia protocol Call LIP unit(s) if Blood Glucose is between 0 - 70 mg/dL       0 unit(s) if Blood glucose is between       2 unit(s) if Blood glucose is between 151-200      4 unit(s) if Blood glucose is between 201-250      6 unit(s) if Blood glucose is between 251-300      8 unit(s) if Blood glucose is between 301-350      10 unit(s) if Blood glucose is between 351-400            If blood glucose is greater than 400 mg/dL, give max insulin per sliding scale AND then contact provider. "     ?Treatment of Hypoglycemia (Low blood sugar)     ?If Blood Glucose is under 70 or below drink 4 ounces of fruit juice or regular cola (both = 15 grams of carbs) or wait 15 minutes and recheck; if still under 70 repeat until above 70.               The rule of 15: Consume 15 g of simple carbohydrates, such as glucose tablets or orange juice. Wait 15 minutes and measure your blood sugar levels again. If your blood sugar is still between 55 to 69 mg/dL, consume another 15 g of carbohydrates. Keep repeating until your blood sugar is above 70 mg/dL     These items have about 15 grams of carbs:     4 ounces (½ cup) of juice or regular soda.     1 tablespoon of sugar, honey, or syrup.     Hard candies, jellybeans, or gumdrops (see food label for how much to eat)          Keep your new, unused insulin pens in the refrigerator door. This keeps them from being pushed to the back of the refrigerator, where they can freeze.     Keep the insulin pen you’re currently using at room temperature (below 86 °F, or 30 °C). Once you use an insulin pen the first time, never put it back in the refrigerator.     Once your pen is ready, chosen an injection site. Don’t inject insulin into the same spot you used the last time, or near incisions, scars, or stretch marks.     Each injection should be at least 2 inches (5 centimeters) from the last site you used. This helps prevent soreness and scar tissues. Following a pattern can help you remember to rotate injection sites (see figure 9).     Figure 9. Injection sites, Picture, Picture

## 2025-07-25 NOTE — CARE PLAN
Problem: Pain - Adult  Goal: Verbalizes/displays adequate comfort level or baseline comfort level  Outcome: Progressing     Problem: Infection - Adult  Goal: Absence of infection at discharge  Outcome: Progressing  Goal: Absence of infection during hospitalization  Outcome: Progressing  Goal: Absence of fever/infection during anticipated neutropenic period  Outcome: Progressing     Problem: Safety - Adult  Goal: Free from fall injury  Outcome: Progressing     Problem: Discharge Planning  Goal: Discharge to home or other facility with appropriate resources  Outcome: Progressing     Problem: Chronic Conditions and Co-morbidities  Goal: Patient's chronic conditions and co-morbidity symptoms are monitored and maintained or improved  Outcome: Progressing     Problem: Nutrition  Goal: Nutrient intake appropriate for maintaining nutritional needs  Outcome: Progressing     Problem: Diabetes  Goal: Achieve decreasing blood glucose levels by end of shift  Outcome: Progressing  Goal: Increase stability of blood glucose readings by end of shift  Outcome: Progressing  Goal: Decrease in ketones present in urine by end of shift  Outcome: Progressing  Goal: Maintain electrolyte levels within acceptable range throughout shift  Outcome: Progressing  Goal: Maintain glucose levels >70mg/dl to <250mg/dl throughout shift  Outcome: Progressing  Goal: No changes in neurological exam by end of shift  Outcome: Progressing  Goal: Learn about and adhere to nutrition recommendations by end of shift  Outcome: Progressing  Goal: Vital signs within normal range for age by end of shift  Outcome: Progressing  Goal: Increase self care and/or family involovement by end of shift  Outcome: Progressing  Goal: Receive DSME education by end of shift  Outcome: Progressing     Problem: Potential for Harm to Self or Others  Goal: Cooperates with admission process  Outcome: Progressing  Goal: Participates in unit activities  Outcome: Progressing  Goal:  Patient/Family participate in treatment and discharge plans  Outcome: Progressing  Goal: Identifies deescalation techniques  Outcome: Progressing  Goal: Understands least restrictive measures  Outcome: Progressing  Goal: Identifies stressors that lead to harmful behaviors  Outcome: Progressing  Goal: Notifies staff when experiencing harmful thoughts toward self/others  Outcome: Progressing  Goal: Denies harm toward self or others  Outcome: Progressing  Goal: Free from restraint events  Outcome: Progressing     Problem: Anxiety  Goal: Patient/family understands admission protocols  Outcome: Progressing  Goal: Attempts to manage anxiety with help  Outcome: Progressing  Goal: Verbalizes ways to manage anxiety  Outcome: Progressing  Goal: Implements measures to reduce anxiety  Outcome: Progressing  Goal: Free from restraint events  Outcome: Progressing     Problem: Defensive Coping  Goal: Cooperates with admission process  Outcome: Progressing  Goal: Identifies reckless/dangerous behavior  Outcome: Progressing  Goal: Identifies stressors that lead to reckless/dangerous behavior  Outcome: Progressing  Goal: Discusses and identifies healthy coping skills  Outcome: Progressing  Goal: Demonstrates healthy coping skills  Outcome: Progressing  Goal: Identifies appropriate social interaction  Outcome: Progressing  Goal: Demonstrates appropriate social interactions  Outcome: Progressing  Goal: Patient/Family verbalizes awareness of resources  Outcome: Progressing  Goal: Discusses signs/symptoms of illness/treatment options  Outcome: Progressing  Goal: Patient/Family participate in treatment and discharge plans  Outcome: Progressing  Goal: Understands least restrictive measures  Outcome: Progressing  Goal: Free from restraint events  Outcome: Progressing     Problem: Risk for Suicide  Goal: Accepts medications as prescribed/needed this shift  Outcome: Progressing  Goal: Identifies supports this shift  Outcome: Progressing  Goal:  Makes needs known through verbalization or behaviors this shift  Outcome: Progressing  Goal: No self harm this shift  Outcome: Progressing  Goal: Read Safety Guidelines this shift  Outcome: Progressing  Goal: Complete Mental Health Safety Plan (psychiatry only) this shift  Outcome: Progressing   The patient's goals for the shift include sleep    The clinical goals for the shift include sleep +6    Patient visible on unit. Behavior appropriate. Pt complains of anxiety and requested his evening medication early. Pt denies SI, HI and AVH. Pt is caring for ADL's and cooperative with unit milieu. No complaints verbalized to staff.

## 2025-07-25 NOTE — DISCHARGE SUMMARY
Discharge Diagnosis:  Bipolar Depression    Hospital Course:  Patient is a 55-year-old male with a history of bipolar disorder who was admitted to [facility] for SI. Due to acutely elevated and imminent risk for self-harm/harm to others, patient required a level of care equivalent to inpatient hospitalization for safety, evaluation, treatment and stabilization.     The patient was admitted to [facility] under the care of Dr. Akins, restricted to the prater and placed on suicide, behavior and elopement precautions.  At the beginning of hospitalization, patient  presents the ED with suicidal ideation with a plan to overdose on insulin.  Patient reports that he got into an argument with his sister.  He has been struggling with some chronic card debt and he was helping his sister would help him figure out the best way to paid off moving forward.  She started to scold him and speak negatively toward him.  He became upset, started to get anxious and his mood decreased significantly.  Started to experience suicidal ideation.  Patient reports he currently lives with his wife, describes are supportive.  States that prior to this event he has been doing okay on current dose of Geodon 80 mg oral twice daily and BuSpar 10 mg oral twice daily.  Denies any family history of psychiatric illness.  Denies any substance use.       The treatment team made the following interventions: medication, group/milieu therapy, individual therapy    Over the course of hospitalization, patient reported improvement and objective signs of improvement were noted by staff.  Patient reported improved mood and sleep.  Increase patient's BuSpar to 15 mg oral TID.  Patient tolerated medication change well.  Reported decrease in anxiety and depression.  Prior to discharge he was future-oriented, looking forward to spending time with his wife.  Understood the importance of following up with outpatient psych services and maintaining  medication compliance.    Psychiatric Medications: Continued Geodon 80 mg oral BID, Buspirone 15 mg oral TID.  Patient tolerated medications without side effects.    Prior to the date of discharge, patient was able to contract for safety and stated they felt safe and appropriate for discharge.  The treatment team found the patient not to be an imminent danger to self or others.  The patient denied suicidal or homicidal ideation and did not endorse auditory and visual hallucinations.  The patient's condition at the time of discharge was stable and initial symptoms improved over the course of hospitalization.    The patient was discharged home under the supervision of family with a 30-day supply of Continued Geodon 80 mg oral BID, Buspirone 15 mg oral TID.      The patient was instructed to call the patient's outpatient provider in the event of worsening symptoms or medication side effects.  Should the patient be unable to maintain their personal safety or the safety of others, instructions were provided to dial [id number] or go to the closest emergency room.    Discharge Mental Status Exam:  General:  Patient is awake, alert, and oriented to person, place, time, and situation.    Appearance:  Appears well-hydrated, well-nourished, and well-groomed and approximately stated age.   Attitude:  Patient was calm and cooperative throughout the interview, which is appropriate to the context of the interview and the topics discussed.   Behavior:  Eye contact is appropriate with topics of discussion.   Motor Activity:  Motor activity is normal. No psychomotor disturbances or abnormal involuntary movements were noted, including psychomotor agitation, psychomotor retardation, involuntary movements, extrapyramidal symptoms, akathisia, or tardive dyskinesia. Gait is normal.   Speech:  Speech is spontaneous, coherent, fluent and of appropriate quantity, rate, volume, and tone and non-vulgar/vulgar.  Speech and mannerisms are consistent  with topics of discussion.   Affect:  Euthymic with a full range, mood congruent and appropriate to content.   Thought Process:  Thought process was linear, organized, and goal-directed and devoid of loose associations, flight of ideas, thought blocking or tangents.   Thought Content:  Thought content was devoid of suicidal ideation or intent, homicidal ideation or intent, self-harm ideation or intent, delusions, illusions, obsessions, or paranoia.   Thought Perception:  Did not endorse auditory or visual hallucinations. Patient did not appear to be internally distracted or preoccupied.   Cognition:  Knowledge and intelligence are believed to be average.  Recent and remote memory, fund of knowledge, and abstract reasoning appear grossly intact, appropriate for age and education, and there are no impairments in attention, concentration, or language.   Insight:  Insight regarding psychiatric conditions is good.   Judgment:  Judgment is good.    Recent Labs:  Results for orders placed or performed during the hospital encounter of 07/23/25 (from the past 24 hours)   POCT GLUCOSE   Result Value Ref Range    POCT Glucose 248 (H) 74 - 99 mg/dL   POCT GLUCOSE   Result Value Ref Range    POCT Glucose 98 74 - 99 mg/dL   POCT GLUCOSE   Result Value Ref Range    POCT Glucose 227 (H) 74 - 99 mg/dL   POCT GLUCOSE   Result Value Ref Range    POCT Glucose 262 (H) 74 - 99 mg/dL   POCT GLUCOSE   Result Value Ref Range    POCT Glucose 55 (L) 74 - 99 mg/dL   POCT GLUCOSE   Result Value Ref Range    POCT Glucose 92 74 - 99 mg/dL   POCT GLUCOSE   Result Value Ref Range    POCT Glucose 124 (H) 74 - 99 mg/dL        Risk Assessment at Discharge:  Violence Risk Assessment: major mental illness  Acute Risk of Harm to Others is Considered: low   Risk Mitigated by: Adherence to treatment, strong therapeutic alliance. Follow-up.    Suicide Risk Assessment: current psychiatric illness and male  Protective Factors against Suicide: adherence to   treatment, hopefulness/future orientation, positive family relationships, sense of responsibility toward family, and strong therapeutic alliance with provider  Acute Risk of Harm to Self is Considered: low  Risk Mitigated by: Adherence to treatment, strong therapeutic alliance. Follow-up.      Outpatient Follow-up Appointments:    [facility] and [address]  [place] [id number]  Phone: [phone]  Go on 7/31/2025  Carlyle is scheduled for 7/31 at 9 am for medication management.      Pino Mauro, ALESIA-CNP

## 2025-07-30 LAB
ATRIAL RATE: 86 BPM
P AXIS: 64 DEGREES
P OFFSET: 200 MS
P ONSET: 145 MS
PR INTERVAL: 150 MS
Q ONSET: 220 MS
QRS COUNT: 14 BEATS
QRS DURATION: 76 MS
QT INTERVAL: 370 MS
QTC CALCULATION(BAZETT): 442 MS
QTC FREDERICIA: 417 MS
R AXIS: 36 DEGREES
T AXIS: 63 DEGREES
T OFFSET: 405 MS
VENTRICULAR RATE: 86 BPM

## 2025-08-12 ENCOUNTER — PHARMACY VISIT (OUTPATIENT)
Dept: PHARMACY | Facility: CLINIC | Age: 55
End: 2025-08-12
Payer: COMMERCIAL

## 2025-08-12 PROCEDURE — RXMED WILLOW AMBULATORY MEDICATION CHARGE

## 2025-08-18 ENCOUNTER — PHARMACY VISIT (OUTPATIENT)
Dept: PHARMACY | Facility: CLINIC | Age: 55
End: 2025-08-18
Payer: MEDICARE

## 2025-08-18 PROCEDURE — RXMED WILLOW AMBULATORY MEDICATION CHARGE

## 2025-08-20 ENCOUNTER — HOSPITAL ENCOUNTER (EMERGENCY)
Facility: HOSPITAL | Age: 55
Discharge: HOME | End: 2025-08-20
Attending: STUDENT IN AN ORGANIZED HEALTH CARE EDUCATION/TRAINING PROGRAM
Payer: MEDICARE

## 2025-08-20 ENCOUNTER — APPOINTMENT (OUTPATIENT)
Dept: CARDIOLOGY | Facility: HOSPITAL | Age: 55
End: 2025-08-20
Payer: MEDICARE

## 2025-08-20 VITALS
OXYGEN SATURATION: 100 % | BODY MASS INDEX: 28.12 KG/M2 | HEIGHT: 66 IN | DIASTOLIC BLOOD PRESSURE: 82 MMHG | SYSTOLIC BLOOD PRESSURE: 150 MMHG | HEART RATE: 86 BPM | RESPIRATION RATE: 16 BRPM | WEIGHT: 175 LBS | TEMPERATURE: 98.2 F

## 2025-08-20 DIAGNOSIS — E16.2 HYPOGLYCEMIA: Primary | ICD-10-CM

## 2025-08-20 LAB
ALBUMIN SERPL BCP-MCNC: 3.9 G/DL (ref 3.4–5)
ALP SERPL-CCNC: 82 U/L (ref 33–120)
ALT SERPL W P-5'-P-CCNC: 20 U/L (ref 10–52)
ANION GAP SERPL CALC-SCNC: 11 MMOL/L (ref 10–20)
AST SERPL W P-5'-P-CCNC: 19 U/L (ref 9–39)
ATRIAL RATE: 80 BPM
BASOPHILS # BLD AUTO: 0.03 X10*3/UL (ref 0–0.1)
BASOPHILS NFR BLD AUTO: 0.4 %
BILIRUB SERPL-MCNC: 0.3 MG/DL (ref 0–1.2)
BUN SERPL-MCNC: 22 MG/DL (ref 6–23)
CALCIUM SERPL-MCNC: 9 MG/DL (ref 8.6–10.3)
CHLORIDE SERPL-SCNC: 106 MMOL/L (ref 98–107)
CO2 SERPL-SCNC: 24 MMOL/L (ref 21–32)
CREAT SERPL-MCNC: 1.1 MG/DL (ref 0.5–1.3)
EGFRCR SERPLBLD CKD-EPI 2021: 79 ML/MIN/1.73M*2
EOSINOPHIL # BLD AUTO: 0.17 X10*3/UL (ref 0–0.7)
EOSINOPHIL NFR BLD AUTO: 2.2 %
ERYTHROCYTE [DISTWIDTH] IN BLOOD BY AUTOMATED COUNT: 12.9 % (ref 11.5–14.5)
GLUCOSE BLD MANUAL STRIP-MCNC: 116 MG/DL (ref 74–99)
GLUCOSE BLD MANUAL STRIP-MCNC: 98 MG/DL (ref 74–99)
GLUCOSE SERPL-MCNC: 123 MG/DL (ref 74–99)
HCT VFR BLD AUTO: 36.8 % (ref 41–52)
HGB BLD-MCNC: 12.7 G/DL (ref 13.5–17.5)
IMM GRANULOCYTES # BLD AUTO: 0.02 X10*3/UL (ref 0–0.7)
IMM GRANULOCYTES NFR BLD AUTO: 0.3 % (ref 0–0.9)
LYMPHOCYTES # BLD AUTO: 0.66 X10*3/UL (ref 1.2–4.8)
LYMPHOCYTES NFR BLD AUTO: 8.4 %
MCH RBC QN AUTO: 30.8 PG (ref 26–34)
MCHC RBC AUTO-ENTMCNC: 34.5 G/DL (ref 32–36)
MCV RBC AUTO: 89 FL (ref 80–100)
MONOCYTES # BLD AUTO: 0.46 X10*3/UL (ref 0.1–1)
MONOCYTES NFR BLD AUTO: 5.9 %
NEUTROPHILS # BLD AUTO: 6.49 X10*3/UL (ref 1.2–7.7)
NEUTROPHILS NFR BLD AUTO: 82.8 %
NRBC BLD-RTO: 0 /100 WBCS (ref 0–0)
P AXIS: 63 DEGREES
P OFFSET: 204 MS
P ONSET: 151 MS
PLATELET # BLD AUTO: 180 X10*3/UL (ref 150–450)
POTASSIUM SERPL-SCNC: 4 MMOL/L (ref 3.5–5.3)
PR INTERVAL: 142 MS
PROT SERPL-MCNC: 6.7 G/DL (ref 6.4–8.2)
Q ONSET: 222 MS
QRS COUNT: 14 BEATS
QRS DURATION: 82 MS
QT INTERVAL: 408 MS
QTC CALCULATION(BAZETT): 470 MS
QTC FREDERICIA: 449 MS
R AXIS: 2 DEGREES
RBC # BLD AUTO: 4.12 X10*6/UL (ref 4.5–5.9)
SODIUM SERPL-SCNC: 137 MMOL/L (ref 136–145)
T AXIS: 67 DEGREES
T OFFSET: 426 MS
VENTRICULAR RATE: 80 BPM
WBC # BLD AUTO: 7.8 X10*3/UL (ref 4.4–11.3)

## 2025-08-20 PROCEDURE — 80053 COMPREHEN METABOLIC PANEL: CPT | Performed by: STUDENT IN AN ORGANIZED HEALTH CARE EDUCATION/TRAINING PROGRAM

## 2025-08-20 PROCEDURE — 85025 COMPLETE CBC W/AUTO DIFF WBC: CPT | Performed by: STUDENT IN AN ORGANIZED HEALTH CARE EDUCATION/TRAINING PROGRAM

## 2025-08-20 PROCEDURE — 82947 ASSAY GLUCOSE BLOOD QUANT: CPT | Mod: 59

## 2025-08-20 PROCEDURE — 36415 COLL VENOUS BLD VENIPUNCTURE: CPT | Performed by: STUDENT IN AN ORGANIZED HEALTH CARE EDUCATION/TRAINING PROGRAM

## 2025-08-20 PROCEDURE — 93005 ELECTROCARDIOGRAM TRACING: CPT

## 2025-08-20 PROCEDURE — 99284 EMERGENCY DEPT VISIT MOD MDM: CPT | Performed by: STUDENT IN AN ORGANIZED HEALTH CARE EDUCATION/TRAINING PROGRAM

## 2025-08-20 ASSESSMENT — PAIN - FUNCTIONAL ASSESSMENT: PAIN_FUNCTIONAL_ASSESSMENT: 0-10

## 2025-08-20 ASSESSMENT — PAIN SCALES - GENERAL: PAINLEVEL_OUTOF10: 0 - NO PAIN

## 2025-08-21 ENCOUNTER — APPOINTMENT (OUTPATIENT)
Dept: CARDIOLOGY | Facility: HOSPITAL | Age: 55
End: 2025-08-21
Payer: MEDICARE

## 2025-08-21 ENCOUNTER — HOSPITAL ENCOUNTER (EMERGENCY)
Facility: HOSPITAL | Age: 55
Discharge: HOME | End: 2025-08-21
Attending: STUDENT IN AN ORGANIZED HEALTH CARE EDUCATION/TRAINING PROGRAM
Payer: MEDICARE

## 2025-08-21 VITALS
BODY MASS INDEX: 26.57 KG/M2 | TEMPERATURE: 97.3 F | SYSTOLIC BLOOD PRESSURE: 160 MMHG | HEART RATE: 93 BPM | WEIGHT: 165.34 LBS | HEIGHT: 66 IN | OXYGEN SATURATION: 97 % | RESPIRATION RATE: 16 BRPM | DIASTOLIC BLOOD PRESSURE: 82 MMHG

## 2025-08-21 DIAGNOSIS — E16.2 HYPOGLYCEMIA: Primary | ICD-10-CM

## 2025-08-21 LAB
ALBUMIN SERPL BCP-MCNC: 3.2 G/DL (ref 3.4–5)
ALP SERPL-CCNC: 77 U/L (ref 33–120)
ALT SERPL W P-5'-P-CCNC: 19 U/L (ref 10–52)
ANION GAP SERPL CALC-SCNC: 9 MMOL/L (ref 10–20)
AST SERPL W P-5'-P-CCNC: 19 U/L (ref 9–39)
BASOPHILS # BLD AUTO: 0.03 X10*3/UL (ref 0–0.1)
BASOPHILS NFR BLD AUTO: 0.4 %
BILIRUB SERPL-MCNC: 0.2 MG/DL (ref 0–1.2)
BUN SERPL-MCNC: 17 MG/DL (ref 6–23)
CALCIUM SERPL-MCNC: 7.6 MG/DL (ref 8.6–10.3)
CHLORIDE SERPL-SCNC: 115 MMOL/L (ref 98–107)
CO2 SERPL-SCNC: 21 MMOL/L (ref 21–32)
CREAT SERPL-MCNC: 0.95 MG/DL (ref 0.5–1.3)
EGFRCR SERPLBLD CKD-EPI 2021: >90 ML/MIN/1.73M*2
EOSINOPHIL # BLD AUTO: 0.14 X10*3/UL (ref 0–0.7)
EOSINOPHIL NFR BLD AUTO: 1.9 %
ERYTHROCYTE [DISTWIDTH] IN BLOOD BY AUTOMATED COUNT: 13.1 % (ref 11.5–14.5)
GLUCOSE BLD MANUAL STRIP-MCNC: 74 MG/DL (ref 74–99)
GLUCOSE SERPL-MCNC: 62 MG/DL (ref 74–99)
HCT VFR BLD AUTO: 33.8 % (ref 41–52)
HGB BLD-MCNC: 11.6 G/DL (ref 13.5–17.5)
HOLD SPECIMEN: NORMAL
HOLD SPECIMEN: NORMAL
IMM GRANULOCYTES # BLD AUTO: 0.03 X10*3/UL (ref 0–0.7)
IMM GRANULOCYTES NFR BLD AUTO: 0.4 % (ref 0–0.9)
LYMPHOCYTES # BLD AUTO: 0.83 X10*3/UL (ref 1.2–4.8)
LYMPHOCYTES NFR BLD AUTO: 11 %
MAGNESIUM SERPL-MCNC: 1.67 MG/DL (ref 1.6–2.4)
MCH RBC QN AUTO: 31 PG (ref 26–34)
MCHC RBC AUTO-ENTMCNC: 34.3 G/DL (ref 32–36)
MCV RBC AUTO: 90 FL (ref 80–100)
MONOCYTES # BLD AUTO: 0.6 X10*3/UL (ref 0.1–1)
MONOCYTES NFR BLD AUTO: 8 %
NEUTROPHILS # BLD AUTO: 5.91 X10*3/UL (ref 1.2–7.7)
NEUTROPHILS NFR BLD AUTO: 78.3 %
NRBC BLD-RTO: 0 /100 WBCS (ref 0–0)
PLATELET # BLD AUTO: 193 X10*3/UL (ref 150–450)
POTASSIUM SERPL-SCNC: 3.8 MMOL/L (ref 3.5–5.3)
PROT SERPL-MCNC: 5.4 G/DL (ref 6.4–8.2)
RBC # BLD AUTO: 3.74 X10*6/UL (ref 4.5–5.9)
SODIUM SERPL-SCNC: 141 MMOL/L (ref 136–145)
WBC # BLD AUTO: 7.5 X10*3/UL (ref 4.4–11.3)

## 2025-08-21 PROCEDURE — 99284 EMERGENCY DEPT VISIT MOD MDM: CPT | Performed by: STUDENT IN AN ORGANIZED HEALTH CARE EDUCATION/TRAINING PROGRAM

## 2025-08-21 PROCEDURE — 80053 COMPREHEN METABOLIC PANEL: CPT | Performed by: PHYSICIAN ASSISTANT

## 2025-08-21 PROCEDURE — 82947 ASSAY GLUCOSE BLOOD QUANT: CPT

## 2025-08-21 PROCEDURE — 85025 COMPLETE CBC W/AUTO DIFF WBC: CPT | Performed by: PHYSICIAN ASSISTANT

## 2025-08-21 PROCEDURE — 83735 ASSAY OF MAGNESIUM: CPT | Performed by: PHYSICIAN ASSISTANT

## 2025-08-21 PROCEDURE — 93005 ELECTROCARDIOGRAM TRACING: CPT

## 2025-08-21 PROCEDURE — 36415 COLL VENOUS BLD VENIPUNCTURE: CPT | Performed by: PHYSICIAN ASSISTANT

## 2025-08-21 ASSESSMENT — PAIN - FUNCTIONAL ASSESSMENT: PAIN_FUNCTIONAL_ASSESSMENT: 0-10

## 2025-08-21 ASSESSMENT — LIFESTYLE VARIABLES
HAVE YOU EVER FELT YOU SHOULD CUT DOWN ON YOUR DRINKING: NO
TOTAL SCORE: 0
EVER HAD A DRINK FIRST THING IN THE MORNING TO STEADY YOUR NERVES TO GET RID OF A HANGOVER: NO
HAVE PEOPLE ANNOYED YOU BY CRITICIZING YOUR DRINKING: NO
EVER FELT BAD OR GUILTY ABOUT YOUR DRINKING: NO

## 2025-08-21 ASSESSMENT — PAIN DESCRIPTION - PROGRESSION: CLINICAL_PROGRESSION: NOT CHANGED

## 2025-08-22 LAB
ATRIAL RATE: 90 BPM
P AXIS: 70 DEGREES
P OFFSET: 198 MS
P ONSET: 146 MS
PR INTERVAL: 146 MS
Q ONSET: 219 MS
QRS COUNT: 15 BEATS
QRS DURATION: 74 MS
QT INTERVAL: 362 MS
QTC CALCULATION(BAZETT): 442 MS
QTC FREDERICIA: 414 MS
R AXIS: 37 DEGREES
T AXIS: 67 DEGREES
T OFFSET: 400 MS
VENTRICULAR RATE: 90 BPM

## 2025-08-27 LAB
ATRIAL RATE: 80 BPM
ATRIAL RATE: 90 BPM
P AXIS: 63 DEGREES
P AXIS: 70 DEGREES
P OFFSET: 198 MS
P OFFSET: 204 MS
P ONSET: 146 MS
P ONSET: 151 MS
PR INTERVAL: 142 MS
PR INTERVAL: 146 MS
Q ONSET: 219 MS
Q ONSET: 222 MS
QRS COUNT: 14 BEATS
QRS COUNT: 15 BEATS
QRS DURATION: 74 MS
QRS DURATION: 82 MS
QT INTERVAL: 362 MS
QT INTERVAL: 408 MS
QTC CALCULATION(BAZETT): 442 MS
QTC CALCULATION(BAZETT): 470 MS
QTC FREDERICIA: 414 MS
QTC FREDERICIA: 449 MS
R AXIS: 2 DEGREES
R AXIS: 37 DEGREES
T AXIS: 67 DEGREES
T AXIS: 67 DEGREES
T OFFSET: 400 MS
T OFFSET: 426 MS
VENTRICULAR RATE: 80 BPM
VENTRICULAR RATE: 90 BPM

## 2025-09-01 ENCOUNTER — HOSPITAL ENCOUNTER (EMERGENCY)
Facility: HOSPITAL | Age: 55
Discharge: HOME | End: 2025-09-02
Attending: STUDENT IN AN ORGANIZED HEALTH CARE EDUCATION/TRAINING PROGRAM
Payer: MEDICARE

## 2025-09-01 VITALS
HEIGHT: 66 IN | TEMPERATURE: 97.3 F | HEART RATE: 92 BPM | OXYGEN SATURATION: 100 % | BODY MASS INDEX: 28.12 KG/M2 | SYSTOLIC BLOOD PRESSURE: 168 MMHG | WEIGHT: 175 LBS | RESPIRATION RATE: 18 BRPM | DIASTOLIC BLOOD PRESSURE: 86 MMHG

## 2025-09-01 DIAGNOSIS — E16.2 HYPOGLYCEMIA: Primary | ICD-10-CM

## 2025-09-01 LAB
ALBUMIN SERPL BCP-MCNC: 3.8 G/DL (ref 3.4–5)
ALP SERPL-CCNC: 78 U/L (ref 33–120)
ALT SERPL W P-5'-P-CCNC: 15 U/L (ref 10–52)
ANION GAP SERPL CALC-SCNC: 9 MMOL/L (ref 10–20)
AST SERPL W P-5'-P-CCNC: 18 U/L (ref 9–39)
BASOPHILS # BLD AUTO: 0.02 X10*3/UL (ref 0–0.1)
BASOPHILS NFR BLD AUTO: 0.3 %
BILIRUB SERPL-MCNC: 0.3 MG/DL (ref 0–1.2)
BUN SERPL-MCNC: 28 MG/DL (ref 6–23)
CALCIUM SERPL-MCNC: 8.8 MG/DL (ref 8.6–10.3)
CHLORIDE SERPL-SCNC: 107 MMOL/L (ref 98–107)
CO2 SERPL-SCNC: 27 MMOL/L (ref 21–32)
CREAT SERPL-MCNC: 1.13 MG/DL (ref 0.5–1.3)
EGFRCR SERPLBLD CKD-EPI 2021: 76 ML/MIN/1.73M*2
EOSINOPHIL # BLD AUTO: 0.2 X10*3/UL (ref 0–0.7)
EOSINOPHIL NFR BLD AUTO: 3.1 %
ERYTHROCYTE [DISTWIDTH] IN BLOOD BY AUTOMATED COUNT: 13.2 % (ref 11.5–14.5)
GLUCOSE BLD MANUAL STRIP-MCNC: 103 MG/DL (ref 74–99)
GLUCOSE BLD MANUAL STRIP-MCNC: 187 MG/DL (ref 74–99)
GLUCOSE BLD MANUAL STRIP-MCNC: 36 MG/DL (ref 74–99)
GLUCOSE BLD MANUAL STRIP-MCNC: 40 MG/DL (ref 74–99)
GLUCOSE BLD MANUAL STRIP-MCNC: 77 MG/DL (ref 74–99)
GLUCOSE SERPL-MCNC: 105 MG/DL (ref 74–99)
HCT VFR BLD AUTO: 36 % (ref 41–52)
HGB BLD-MCNC: 12.2 G/DL (ref 13.5–17.5)
IMM GRANULOCYTES # BLD AUTO: 0.03 X10*3/UL (ref 0–0.7)
IMM GRANULOCYTES NFR BLD AUTO: 0.5 % (ref 0–0.9)
LIPASE SERPL-CCNC: 52 U/L (ref 9–82)
LYMPHOCYTES # BLD AUTO: 0.73 X10*3/UL (ref 1.2–4.8)
LYMPHOCYTES NFR BLD AUTO: 11.2 %
MCH RBC QN AUTO: 31 PG (ref 26–34)
MCHC RBC AUTO-ENTMCNC: 33.9 G/DL (ref 32–36)
MCV RBC AUTO: 92 FL (ref 80–100)
MONOCYTES # BLD AUTO: 0.47 X10*3/UL (ref 0.1–1)
MONOCYTES NFR BLD AUTO: 7.2 %
NEUTROPHILS # BLD AUTO: 5.06 X10*3/UL (ref 1.2–7.7)
NEUTROPHILS NFR BLD AUTO: 77.7 %
NRBC BLD-RTO: 0 /100 WBCS (ref 0–0)
PLATELET # BLD AUTO: 198 X10*3/UL (ref 150–450)
POTASSIUM SERPL-SCNC: 3.7 MMOL/L (ref 3.5–5.3)
PROT SERPL-MCNC: 6.5 G/DL (ref 6.4–8.2)
RBC # BLD AUTO: 3.93 X10*6/UL (ref 4.5–5.9)
SODIUM SERPL-SCNC: 139 MMOL/L (ref 136–145)
WBC # BLD AUTO: 6.5 X10*3/UL (ref 4.4–11.3)

## 2025-09-01 PROCEDURE — 82947 ASSAY GLUCOSE BLOOD QUANT: CPT

## 2025-09-01 PROCEDURE — 99284 EMERGENCY DEPT VISIT MOD MDM: CPT | Mod: 25 | Performed by: STUDENT IN AN ORGANIZED HEALTH CARE EDUCATION/TRAINING PROGRAM

## 2025-09-01 PROCEDURE — 2500000005 HC RX 250 GENERAL PHARMACY W/O HCPCS: Performed by: STUDENT IN AN ORGANIZED HEALTH CARE EDUCATION/TRAINING PROGRAM

## 2025-09-01 PROCEDURE — 83690 ASSAY OF LIPASE: CPT | Performed by: STUDENT IN AN ORGANIZED HEALTH CARE EDUCATION/TRAINING PROGRAM

## 2025-09-01 PROCEDURE — 85025 COMPLETE CBC W/AUTO DIFF WBC: CPT | Performed by: STUDENT IN AN ORGANIZED HEALTH CARE EDUCATION/TRAINING PROGRAM

## 2025-09-01 PROCEDURE — 80053 COMPREHEN METABOLIC PANEL: CPT | Performed by: STUDENT IN AN ORGANIZED HEALTH CARE EDUCATION/TRAINING PROGRAM

## 2025-09-01 PROCEDURE — 96374 THER/PROPH/DIAG INJ IV PUSH: CPT

## 2025-09-01 RX ORDER — DEXTROSE 50 % IN WATER (D50W) INTRAVENOUS SYRINGE
25 ONCE
Status: COMPLETED | OUTPATIENT
Start: 2025-09-01 | End: 2025-09-01

## 2025-09-01 RX ADMIN — DEXTROSE MONOHYDRATE 25 G: 25 INJECTION, SOLUTION INTRAVENOUS at 16:27

## 2025-09-01 ASSESSMENT — LIFESTYLE VARIABLES
EVER FELT BAD OR GUILTY ABOUT YOUR DRINKING: NO
EVER HAD A DRINK FIRST THING IN THE MORNING TO STEADY YOUR NERVES TO GET RID OF A HANGOVER: NO
HAVE YOU EVER FELT YOU SHOULD CUT DOWN ON YOUR DRINKING: NO
HAVE PEOPLE ANNOYED YOU BY CRITICIZING YOUR DRINKING: NO
TOTAL SCORE: 0

## 2025-09-01 ASSESSMENT — PAIN - FUNCTIONAL ASSESSMENT: PAIN_FUNCTIONAL_ASSESSMENT: 0-10

## 2025-09-01 ASSESSMENT — PAIN SCALES - GENERAL: PAINLEVEL_OUTOF10: 0 - NO PAIN

## 2025-09-02 ENCOUNTER — PHARMACY VISIT (OUTPATIENT)
Dept: PHARMACY | Facility: CLINIC | Age: 55
End: 2025-09-02
Payer: MEDICARE

## 2025-09-02 PROCEDURE — RXMED WILLOW AMBULATORY MEDICATION CHARGE
